# Patient Record
Sex: FEMALE | Race: WHITE | NOT HISPANIC OR LATINO | Employment: OTHER | URBAN - METROPOLITAN AREA
[De-identification: names, ages, dates, MRNs, and addresses within clinical notes are randomized per-mention and may not be internally consistent; named-entity substitution may affect disease eponyms.]

---

## 2023-01-16 PROBLEM — J18.9 PNEUMONIA: Status: ACTIVE | Noted: 2023-01-16

## 2023-01-16 PROBLEM — R09.89 LUNG CRACKLES: Status: ACTIVE | Noted: 2023-01-16

## 2023-01-16 PROBLEM — F32.0 DEPRESSION, MAJOR, SINGLE EPISODE, MILD (CMS-HCC): Status: ACTIVE | Noted: 2023-01-16

## 2023-01-16 PROBLEM — E66.9 OBESITY: Status: ACTIVE | Noted: 2023-01-16

## 2023-01-16 PROBLEM — R60.0 BILATERAL LEG EDEMA: Status: ACTIVE | Noted: 2023-01-16

## 2023-01-16 PROBLEM — N20.0 NEPHROLITHIASIS: Status: ACTIVE | Noted: 2023-01-16

## 2023-01-16 PROBLEM — D64.9 ANEMIA: Status: ACTIVE | Noted: 2023-01-16

## 2023-01-16 PROBLEM — M19.90 ARTHRITIS: Status: ACTIVE | Noted: 2023-01-16

## 2023-01-16 PROBLEM — N39.0 URINARY TRACT INFECTION: Status: ACTIVE | Noted: 2023-01-16

## 2023-01-16 PROBLEM — R06.02 SHORTNESS OF BREATH: Status: ACTIVE | Noted: 2023-01-16

## 2023-01-16 PROBLEM — R05.9 COUGH: Status: ACTIVE | Noted: 2023-01-16

## 2023-01-16 PROBLEM — F32.A DEPRESSION: Status: RESOLVED | Noted: 2023-01-16 | Resolved: 2023-01-16

## 2023-01-16 PROBLEM — R26.89 UNSTABLE BALANCE: Status: ACTIVE | Noted: 2023-01-16

## 2023-01-16 PROBLEM — M48.00 SPINAL STENOSIS: Status: ACTIVE | Noted: 2023-01-16

## 2023-01-16 PROBLEM — F32.A DEPRESSION: Status: ACTIVE | Noted: 2023-01-16

## 2023-01-16 PROBLEM — E78.2 DM TYPE 2 WITH DIABETIC MIXED HYPERLIPIDEMIA (MULTI): Status: ACTIVE | Noted: 2023-01-16

## 2023-01-16 PROBLEM — E78.5 HYPERLIPIDEMIA: Status: ACTIVE | Noted: 2023-01-16

## 2023-01-16 PROBLEM — M25.551 RIGHT HIP PAIN: Status: ACTIVE | Noted: 2023-01-16

## 2023-01-16 PROBLEM — R53.83 FATIGUE: Status: ACTIVE | Noted: 2023-01-16

## 2023-01-16 PROBLEM — I10 ESSENTIAL HYPERTENSION: Status: ACTIVE | Noted: 2023-01-16

## 2023-01-16 PROBLEM — J06.9 URTI (ACUTE UPPER RESPIRATORY INFECTION): Status: ACTIVE | Noted: 2023-01-16

## 2023-01-16 PROBLEM — M54.50 LOWER BACK PAIN: Status: ACTIVE | Noted: 2023-01-16

## 2023-01-16 PROBLEM — N18.9 CHRONIC KIDNEY DISEASE: Status: ACTIVE | Noted: 2023-01-16

## 2023-01-16 PROBLEM — E11.69 DM TYPE 2 WITH DIABETIC MIXED HYPERLIPIDEMIA (MULTI): Status: ACTIVE | Noted: 2023-01-16

## 2023-01-16 PROBLEM — E55.9 VITAMIN D DEFICIENCY: Status: ACTIVE | Noted: 2023-01-16

## 2023-01-16 RX ORDER — HYDROCODONE/ACETAMINOPHEN 5 MG-500MG
TABLET ORAL
COMMUNITY

## 2023-01-16 RX ORDER — OLMESARTAN MEDOXOMIL 40 MG/1
40 TABLET ORAL DAILY
COMMUNITY
End: 2023-03-06 | Stop reason: SDUPTHER

## 2023-01-16 RX ORDER — AMLODIPINE BESYLATE 10 MG/1
10 TABLET ORAL DAILY
COMMUNITY
End: 2023-03-06 | Stop reason: SDUPTHER

## 2023-01-16 RX ORDER — DICLOFENAC SODIUM 10 MG/G
2 GEL TOPICAL 4 TIMES DAILY
COMMUNITY
End: 2023-03-06 | Stop reason: SDUPTHER

## 2023-01-16 RX ORDER — LANCETS 26 GAUGE
1 EACH MISCELLANEOUS 2 TIMES DAILY
COMMUNITY
End: 2023-03-06 | Stop reason: SDUPTHER

## 2023-01-16 RX ORDER — FUROSEMIDE 20 MG/1
20 TABLET ORAL DAILY
COMMUNITY
End: 2023-03-06 | Stop reason: SDUPTHER

## 2023-01-16 RX ORDER — BLOOD SUGAR DIAGNOSTIC
STRIP MISCELLANEOUS 2 TIMES DAILY
COMMUNITY
End: 2023-03-06 | Stop reason: SDUPTHER

## 2023-01-16 RX ORDER — TIRZEPATIDE 5 MG/.5ML
5 INJECTION, SOLUTION SUBCUTANEOUS
COMMUNITY
End: 2023-03-06 | Stop reason: SDUPTHER

## 2023-01-17 PROBLEM — I10 MALIGNANT HYPERTENSION: Status: ACTIVE | Noted: 2023-01-17

## 2023-01-17 RX ORDER — CHOLECALCIFEROL (VITAMIN D3) 50 MCG
50 TABLET ORAL
COMMUNITY
End: 2023-03-06 | Stop reason: SDUPTHER

## 2023-02-17 PROBLEM — E08.22 DIABETES MELLITUS DUE TO UNDERLYING CONDITION WITH STAGE 3A CHRONIC KIDNEY DISEASE (MULTI): Status: ACTIVE | Noted: 2023-02-17

## 2023-02-17 PROBLEM — N18.31 DIABETES MELLITUS DUE TO UNDERLYING CONDITION WITH STAGE 3A CHRONIC KIDNEY DISEASE (MULTI): Status: ACTIVE | Noted: 2023-02-17

## 2023-02-17 RX ORDER — METOPROLOL TARTRATE 100 MG/1
1 TABLET ORAL
COMMUNITY
Start: 2013-04-17 | End: 2023-03-06 | Stop reason: SDUPTHER

## 2023-02-17 RX ORDER — FENOFIBRATE 145 MG/1
1 TABLET, FILM COATED ORAL
COMMUNITY
Start: 2013-04-17 | End: 2023-03-06 | Stop reason: SDUPTHER

## 2023-02-17 RX ORDER — CITALOPRAM 20 MG/1
1 TABLET, FILM COATED ORAL
COMMUNITY
Start: 2013-04-20 | End: 2023-03-06 | Stop reason: SDUPTHER

## 2023-02-17 RX ORDER — SIMVASTATIN 20 MG/1
1 TABLET, FILM COATED ORAL
COMMUNITY
Start: 2013-04-17 | End: 2023-03-06 | Stop reason: SDUPTHER

## 2023-03-06 ENCOUNTER — LAB (OUTPATIENT)
Dept: LAB | Facility: LAB | Age: 78
End: 2023-03-06
Payer: MEDICARE

## 2023-03-06 ENCOUNTER — OFFICE VISIT (OUTPATIENT)
Dept: PRIMARY CARE | Facility: CLINIC | Age: 78
End: 2023-03-06
Payer: MEDICARE

## 2023-03-06 VITALS — DIASTOLIC BLOOD PRESSURE: 70 MMHG | WEIGHT: 188 LBS | BODY MASS INDEX: 33.3 KG/M2 | SYSTOLIC BLOOD PRESSURE: 100 MMHG

## 2023-03-06 DIAGNOSIS — E11.69 DM TYPE 2 WITH DIABETIC MIXED HYPERLIPIDEMIA (MULTI): ICD-10-CM

## 2023-03-06 DIAGNOSIS — E55.9 VITAMIN D DEFICIENCY: ICD-10-CM

## 2023-03-06 DIAGNOSIS — E78.5 HYPERLIPIDEMIA, UNSPECIFIED HYPERLIPIDEMIA TYPE: ICD-10-CM

## 2023-03-06 DIAGNOSIS — E78.2 DM TYPE 2 WITH DIABETIC MIXED HYPERLIPIDEMIA (MULTI): ICD-10-CM

## 2023-03-06 DIAGNOSIS — E66.09 OBESITY DUE TO EXCESS CALORIES, UNSPECIFIED CLASSIFICATION, UNSPECIFIED WHETHER SERIOUS COMORBIDITY PRESENT: ICD-10-CM

## 2023-03-06 DIAGNOSIS — I10 ESSENTIAL HYPERTENSION: ICD-10-CM

## 2023-03-06 DIAGNOSIS — F32.0 DEPRESSION, MAJOR, SINGLE EPISODE, MILD (CMS-HCC): ICD-10-CM

## 2023-03-06 DIAGNOSIS — I10 ESSENTIAL HYPERTENSION: Primary | ICD-10-CM

## 2023-03-06 LAB
ALBUMIN (MG/L) IN URINE: 8.8 MG/L
ALBUMIN/CREATININE (UG/MG) IN URINE: 13.9 UG/MG CRT (ref 0–30)
CHOLESTEROL (MG/DL) IN SER/PLAS: 127 MG/DL (ref 0–199)
CHOLESTEROL IN HDL (MG/DL) IN SER/PLAS: 45.4 MG/DL
CHOLESTEROL/HDL RATIO: 2.8
CREATININE (MG/DL) IN URINE: 63.4 MG/DL (ref 20–320)
ESTIMATED AVERAGE GLUCOSE FOR HBA1C: 123 MG/DL
HEMOGLOBIN A1C/HEMOGLOBIN TOTAL IN BLOOD: 5.9 %
LDL: 63 MG/DL (ref 0–99)
THYROTROPIN (MIU/L) IN SER/PLAS BY DETECTION LIMIT <= 0.05 MIU/L: 1.89 MIU/L (ref 0.44–3.98)
TRIGLYCERIDE (MG/DL) IN SER/PLAS: 94 MG/DL (ref 0–149)
VLDL: 19 MG/DL (ref 0–40)

## 2023-03-06 PROCEDURE — 84443 ASSAY THYROID STIM HORMONE: CPT

## 2023-03-06 PROCEDURE — 82043 UR ALBUMIN QUANTITATIVE: CPT

## 2023-03-06 PROCEDURE — 83036 HEMOGLOBIN GLYCOSYLATED A1C: CPT

## 2023-03-06 PROCEDURE — 36415 COLL VENOUS BLD VENIPUNCTURE: CPT

## 2023-03-06 PROCEDURE — 82570 ASSAY OF URINE CREATININE: CPT

## 2023-03-06 PROCEDURE — 99214 OFFICE O/P EST MOD 30 MIN: CPT | Performed by: INTERNAL MEDICINE

## 2023-03-06 PROCEDURE — 80061 LIPID PANEL: CPT

## 2023-03-06 PROCEDURE — 3074F SYST BP LT 130 MM HG: CPT | Performed by: INTERNAL MEDICINE

## 2023-03-06 PROCEDURE — 3078F DIAST BP <80 MM HG: CPT | Performed by: INTERNAL MEDICINE

## 2023-03-06 PROCEDURE — 82306 VITAMIN D 25 HYDROXY: CPT

## 2023-03-06 RX ORDER — OLMESARTAN MEDOXOMIL 40 MG/1
40 TABLET ORAL DAILY
Qty: 90 TABLET | Refills: 1 | Status: SHIPPED | OUTPATIENT
Start: 2023-03-06 | End: 2023-03-06 | Stop reason: SDUPTHER

## 2023-03-06 RX ORDER — LANCETS 26 GAUGE
1 EACH MISCELLANEOUS 2 TIMES DAILY
Qty: 100 EACH | Refills: 1 | Status: SHIPPED | OUTPATIENT
Start: 2023-03-06 | End: 2023-03-06 | Stop reason: SDUPTHER

## 2023-03-06 RX ORDER — CHOLECALCIFEROL (VITAMIN D3) 50 MCG
50 TABLET ORAL DAILY
Qty: 90 TABLET | Refills: 1 | Status: SHIPPED | OUTPATIENT
Start: 2023-03-06 | End: 2023-03-06 | Stop reason: SDUPTHER

## 2023-03-06 RX ORDER — BLOOD SUGAR DIAGNOSTIC
1 STRIP MISCELLANEOUS 2 TIMES DAILY
Qty: 100 STRIP | Refills: 2 | Status: SHIPPED | OUTPATIENT
Start: 2023-03-06 | End: 2023-12-19 | Stop reason: SDUPTHER

## 2023-03-06 RX ORDER — BLOOD SUGAR DIAGNOSTIC
1 STRIP MISCELLANEOUS 2 TIMES DAILY
Qty: 100 STRIP | Refills: 2 | Status: SHIPPED | OUTPATIENT
Start: 2023-03-06 | End: 2023-03-06 | Stop reason: SDUPTHER

## 2023-03-06 RX ORDER — TIRZEPATIDE 5 MG/.5ML
5 INJECTION, SOLUTION SUBCUTANEOUS
Qty: 2 ML | Refills: 2 | Status: SHIPPED | OUTPATIENT
Start: 2023-03-06 | End: 2023-05-15 | Stop reason: ALTCHOICE

## 2023-03-06 RX ORDER — AMLODIPINE BESYLATE 10 MG/1
10 TABLET ORAL DAILY
Qty: 90 TABLET | Refills: 1 | Status: SHIPPED | OUTPATIENT
Start: 2023-03-06 | End: 2023-03-06 | Stop reason: SDUPTHER

## 2023-03-06 RX ORDER — METOPROLOL TARTRATE 100 MG/1
100 TABLET ORAL
Qty: 90 TABLET | Refills: 1 | Status: SHIPPED | OUTPATIENT
Start: 2023-03-06 | End: 2023-03-06 | Stop reason: SDUPTHER

## 2023-03-06 RX ORDER — FUROSEMIDE 20 MG/1
20 TABLET ORAL DAILY
Qty: 90 TABLET | Refills: 1 | Status: SHIPPED | OUTPATIENT
Start: 2023-03-06 | End: 2023-03-06 | Stop reason: SDUPTHER

## 2023-03-06 RX ORDER — LANCETS 26 GAUGE
1 EACH MISCELLANEOUS 2 TIMES DAILY
Qty: 100 EACH | Refills: 1 | Status: SHIPPED | OUTPATIENT
Start: 2023-03-06 | End: 2023-12-19 | Stop reason: SDUPTHER

## 2023-03-06 RX ORDER — AMLODIPINE BESYLATE 10 MG/1
10 TABLET ORAL DAILY
Qty: 90 TABLET | Refills: 1 | Status: SHIPPED
Start: 2023-03-06 | End: 2023-03-06 | Stop reason: SDUPTHER

## 2023-03-06 RX ORDER — SIMVASTATIN 20 MG/1
20 TABLET, FILM COATED ORAL
Qty: 90 TABLET | Refills: 1 | Status: SHIPPED | OUTPATIENT
Start: 2023-03-06 | End: 2023-06-05 | Stop reason: SDUPTHER

## 2023-03-06 RX ORDER — CHOLECALCIFEROL (VITAMIN D3) 50 MCG
50 TABLET ORAL DAILY
Qty: 90 TABLET | Refills: 1 | Status: SHIPPED | OUTPATIENT
Start: 2023-03-06 | End: 2023-12-19 | Stop reason: SDUPTHER

## 2023-03-06 RX ORDER — FUROSEMIDE 20 MG/1
20 TABLET ORAL DAILY
Qty: 90 TABLET | Refills: 1 | Status: SHIPPED | OUTPATIENT
Start: 2023-03-06 | End: 2023-06-05 | Stop reason: ALTCHOICE

## 2023-03-06 RX ORDER — CITALOPRAM 20 MG/1
20 TABLET, FILM COATED ORAL
Qty: 90 TABLET | Refills: 1 | Status: SHIPPED | OUTPATIENT
Start: 2023-03-06 | End: 2023-03-06 | Stop reason: SDUPTHER

## 2023-03-06 RX ORDER — SIMVASTATIN 20 MG/1
20 TABLET, FILM COATED ORAL
Qty: 90 TABLET | Refills: 1 | Status: SHIPPED | OUTPATIENT
Start: 2023-03-06 | End: 2023-03-06 | Stop reason: SDUPTHER

## 2023-03-06 RX ORDER — DICLOFENAC SODIUM 10 MG/G
2 GEL TOPICAL 4 TIMES DAILY
Qty: 100 G | Refills: 1 | Status: SHIPPED | OUTPATIENT
Start: 2023-03-06 | End: 2023-04-19 | Stop reason: SDUPTHER

## 2023-03-06 RX ORDER — OLMESARTAN MEDOXOMIL 40 MG/1
40 TABLET ORAL DAILY
Qty: 90 TABLET | Refills: 1 | Status: SHIPPED | OUTPATIENT
Start: 2023-03-06 | End: 2023-06-05 | Stop reason: SDUPTHER

## 2023-03-06 RX ORDER — METOPROLOL TARTRATE 100 MG/1
100 TABLET ORAL
Qty: 90 TABLET | Refills: 1 | Status: SHIPPED | OUTPATIENT
Start: 2023-03-06 | End: 2023-06-05 | Stop reason: SDUPTHER

## 2023-03-06 RX ORDER — DICLOFENAC SODIUM 10 MG/G
2 GEL TOPICAL 4 TIMES DAILY
Qty: 100 G | Refills: 1 | Status: SHIPPED | OUTPATIENT
Start: 2023-03-06 | End: 2023-03-06 | Stop reason: SDUPTHER

## 2023-03-06 RX ORDER — FENOFIBRATE 145 MG/1
145 TABLET, FILM COATED ORAL
Qty: 90 TABLET | Refills: 1 | Status: SHIPPED | OUTPATIENT
Start: 2023-03-06 | End: 2023-03-06 | Stop reason: SDUPTHER

## 2023-03-06 RX ORDER — AMLODIPINE BESYLATE 10 MG/1
10 TABLET ORAL DAILY
Qty: 90 TABLET | Refills: 1 | Status: SHIPPED | OUTPATIENT
Start: 2023-03-06 | End: 2023-06-05 | Stop reason: SDUPTHER

## 2023-03-06 RX ORDER — CITALOPRAM 20 MG/1
20 TABLET, FILM COATED ORAL
Qty: 90 TABLET | Refills: 1 | Status: SHIPPED | OUTPATIENT
Start: 2023-03-06 | End: 2023-06-05 | Stop reason: SDUPTHER

## 2023-03-06 RX ORDER — FENOFIBRATE 145 MG/1
145 TABLET, FILM COATED ORAL
Qty: 90 TABLET | Refills: 1 | Status: SHIPPED | OUTPATIENT
Start: 2023-03-06 | End: 2023-06-05 | Stop reason: SDUPTHER

## 2023-03-06 ASSESSMENT — PATIENT HEALTH QUESTIONNAIRE - PHQ9
2. FEELING DOWN, DEPRESSED OR HOPELESS: NOT AT ALL
SUM OF ALL RESPONSES TO PHQ9 QUESTIONS 1 AND 2: 0
1. LITTLE INTEREST OR PLEASURE IN DOING THINGS: NOT AT ALL

## 2023-03-06 ASSESSMENT — ENCOUNTER SYMPTOMS: HYPERTENSION: 1

## 2023-03-06 NOTE — PROGRESS NOTES
Subjective   Patient ID: Francheska Ochoa is a 77 y.o. female who presents for Hypertension, Hyperlipidemia, Diabetes, and Obesity.    Hypertension    Hyperlipidemia    Diabetes         Review of Systems    Objective   /70   Wt 85.3 kg (188 lb)   BMI 33.30 kg/m²     Physical Exam  Constitutional:       Appearance: She is well-developed.   Cardiovascular:      Rate and Rhythm: Normal rate and regular rhythm.      Heart sounds: Normal heart sounds. No murmur heard.  Pulmonary:      Effort: Pulmonary effort is normal.      Breath sounds: Normal breath sounds.   Abdominal:      General: Bowel sounds are normal.      Palpations: Abdomen is soft.         Assessment/Plan   Problem List Items Addressed This Visit          Circulatory    Essential hypertension - Primary    Relevant Medications    amLODIPine (Norvasc) 10 mg tablet    Autolet lancing device    blood sugar diagnostic (Accu-Chek Guide test strips) strip    cholecalciferol (Vitamin D-3) 50 MCG (2000 UT) tablet    citalopram (CeleXA) 20 mg tablet    diclofenac sodium (Voltaren) 1 % gel gel    fenofibrate (Tricor) 145 mg tablet    furosemide (Lasix) 20 mg tablet    metoprolol tartrate (Lopressor) 100 mg tablet    olmesartan (BENIcar) 40 mg tablet    simvastatin (Zocor) 20 mg tablet    Other Relevant Orders    Albumin , Urine Random    Vitamin D 1,25 Dihydroxy    Hemoglobin A1C    Lipid Panel    TSH with reflex to Free T4 if abnormal       Endocrine/Metabolic    Obesity    Relevant Medications    amLODIPine (Norvasc) 10 mg tablet    Autolet lancing device    blood sugar diagnostic (Accu-Chek Guide test strips) strip    cholecalciferol (Vitamin D-3) 50 MCG (2000 UT) tablet    citalopram (CeleXA) 20 mg tablet    diclofenac sodium (Voltaren) 1 % gel gel    fenofibrate (Tricor) 145 mg tablet    furosemide (Lasix) 20 mg tablet    metoprolol tartrate (Lopressor) 100 mg tablet    olmesartan (BENIcar) 40 mg tablet    simvastatin (Zocor) 20 mg tablet    Vitamin D  deficiency    Relevant Orders    Albumin , Urine Random    Vitamin D 1,25 Dihydroxy    Hemoglobin A1C    Lipid Panel    TSH with reflex to Free T4 if abnormal    DM type 2 with diabetic mixed hyperlipidemia (CMS/HCC)    Relevant Medications    amLODIPine (Norvasc) 10 mg tablet    Autolet lancing device    blood sugar diagnostic (Accu-Chek Guide test strips) strip    cholecalciferol (Vitamin D-3) 50 MCG (2000 UT) tablet    citalopram (CeleXA) 20 mg tablet    diclofenac sodium (Voltaren) 1 % gel gel    fenofibrate (Tricor) 145 mg tablet    furosemide (Lasix) 20 mg tablet    metoprolol tartrate (Lopressor) 100 mg tablet    olmesartan (BENIcar) 40 mg tablet    simvastatin (Zocor) 20 mg tablet    tirzepatide (Mounjaro) 5 mg/0.5 mL pen injector    Other Relevant Orders    Albumin , Urine Random    Vitamin D 1,25 Dihydroxy    Hemoglobin A1C    Lipid Panel    TSH with reflex to Free T4 if abnormal       Other    Depression, major, single episode, mild (CMS/HCC)    Hyperlipidemia    Relevant Medications    amLODIPine (Norvasc) 10 mg tablet    Autolet lancing device    blood sugar diagnostic (Accu-Chek Guide test strips) strip    cholecalciferol (Vitamin D-3) 50 MCG (2000 UT) tablet    citalopram (CeleXA) 20 mg tablet    diclofenac sodium (Voltaren) 1 % gel gel    fenofibrate (Tricor) 145 mg tablet    furosemide (Lasix) 20 mg tablet    metoprolol tartrate (Lopressor) 100 mg tablet    olmesartan (BENIcar) 40 mg tablet    simvastatin (Zocor) 20 mg tablet      HTN.  Well controlled.  Continue with current medications.  Check BP at home daily.  Report to us if the numbers are higher than 130/80.     Diabetes Mellitus type II, under good  control.  1. Rx changes none  2. Education: Reviewed ‘ABCs’ of diabetes management (respective goals in parentheses):  A1C (<7), blood pressure (<130/80), and cholesterol (LDL <100).  3. Compliance at present is estimated to be good. Efforts to improve compliance were discussed.  4. Follow up in  3 months        HLD  Stable  Continue with statins  Check lipids

## 2023-03-09 LAB — CALCIDIOL (25 OH VITAMIN D3) (NG/ML) IN SER/PLAS: 60 NG/ML

## 2023-04-02 DIAGNOSIS — R53.83 OTHER FATIGUE: ICD-10-CM

## 2023-04-03 RX ORDER — OXYBUTYNIN CHLORIDE 5 MG/1
TABLET ORAL
Qty: 90 TABLET | Refills: 0 | Status: SHIPPED | OUTPATIENT
Start: 2023-04-03 | End: 2023-06-05 | Stop reason: ALTCHOICE

## 2023-04-19 DIAGNOSIS — E78.5 HYPERLIPIDEMIA, UNSPECIFIED HYPERLIPIDEMIA TYPE: ICD-10-CM

## 2023-04-19 DIAGNOSIS — E11.69 DM TYPE 2 WITH DIABETIC MIXED HYPERLIPIDEMIA (MULTI): ICD-10-CM

## 2023-04-19 DIAGNOSIS — I10 ESSENTIAL HYPERTENSION: ICD-10-CM

## 2023-04-19 DIAGNOSIS — E66.09 OBESITY DUE TO EXCESS CALORIES, UNSPECIFIED CLASSIFICATION, UNSPECIFIED WHETHER SERIOUS COMORBIDITY PRESENT: ICD-10-CM

## 2023-04-19 DIAGNOSIS — E78.2 DM TYPE 2 WITH DIABETIC MIXED HYPERLIPIDEMIA (MULTI): ICD-10-CM

## 2023-04-19 DIAGNOSIS — M54.50 LOW BACK PAIN, UNSPECIFIED BACK PAIN LATERALITY, UNSPECIFIED CHRONICITY, UNSPECIFIED WHETHER SCIATICA PRESENT: ICD-10-CM

## 2023-04-19 RX ORDER — DICLOFENAC SODIUM 10 MG/G
GEL TOPICAL
Qty: 300 G | Refills: 0 | Status: SHIPPED | OUTPATIENT
Start: 2023-04-19 | End: 2023-06-05 | Stop reason: ALTCHOICE

## 2023-04-21 ENCOUNTER — TELEPHONE (OUTPATIENT)
Dept: PRIMARY CARE | Facility: CLINIC | Age: 78
End: 2023-04-21
Payer: MEDICARE

## 2023-04-21 NOTE — TELEPHONE ENCOUNTER
Pt is calling. [facility] called her to participate in a clinical trial for kidneys.   She is wanting your input.   It would require her to have a kidney biopsy.   Can you please call her to discuss.

## 2023-04-27 DIAGNOSIS — E78.2 DM TYPE 2 WITH DIABETIC MIXED HYPERLIPIDEMIA (MULTI): ICD-10-CM

## 2023-04-27 DIAGNOSIS — E11.69 DM TYPE 2 WITH DIABETIC MIXED HYPERLIPIDEMIA (MULTI): ICD-10-CM

## 2023-04-27 RX ORDER — TIRZEPATIDE 7.5 MG/.5ML
7.5 INJECTION, SOLUTION SUBCUTANEOUS
Qty: 2 ML | Refills: 2 | Status: SHIPPED | OUTPATIENT
Start: 2023-04-27 | End: 2023-05-15 | Stop reason: ALTCHOICE

## 2023-05-15 DIAGNOSIS — E78.2 DM TYPE 2 WITH DIABETIC MIXED HYPERLIPIDEMIA (MULTI): ICD-10-CM

## 2023-05-15 DIAGNOSIS — E11.69 DM TYPE 2 WITH DIABETIC MIXED HYPERLIPIDEMIA (MULTI): ICD-10-CM

## 2023-05-15 RX ORDER — TIRZEPATIDE 7.5 MG/.5ML
7.5 INJECTION, SOLUTION SUBCUTANEOUS
Qty: 2 ML | Refills: 2 | Status: SHIPPED | OUTPATIENT
Start: 2023-05-15 | End: 2023-06-05 | Stop reason: SDUPTHER

## 2023-05-31 DIAGNOSIS — D64.9 ANEMIA, UNSPECIFIED TYPE: ICD-10-CM

## 2023-05-31 RX ORDER — FERROUS GLUCONATE 324(38)MG
TABLET ORAL
Qty: 180 TABLET | Refills: 0 | Status: SHIPPED | OUTPATIENT
Start: 2023-05-31 | End: 2023-10-30

## 2023-06-05 ENCOUNTER — LAB (OUTPATIENT)
Dept: LAB | Facility: LAB | Age: 78
End: 2023-06-05
Payer: MEDICARE

## 2023-06-05 ENCOUNTER — OFFICE VISIT (OUTPATIENT)
Dept: PRIMARY CARE | Facility: CLINIC | Age: 78
End: 2023-06-05
Payer: MEDICARE

## 2023-06-05 VITALS — BODY MASS INDEX: 32.42 KG/M2 | DIASTOLIC BLOOD PRESSURE: 52 MMHG | SYSTOLIC BLOOD PRESSURE: 100 MMHG | WEIGHT: 183 LBS

## 2023-06-05 DIAGNOSIS — N18.31 DIABETES MELLITUS DUE TO UNDERLYING CONDITION WITH STAGE 3A CHRONIC KIDNEY DISEASE, WITHOUT LONG-TERM CURRENT USE OF INSULIN (MULTI): ICD-10-CM

## 2023-06-05 DIAGNOSIS — M19.90 ARTHRITIS: ICD-10-CM

## 2023-06-05 DIAGNOSIS — E66.09 OBESITY DUE TO EXCESS CALORIES, UNSPECIFIED CLASSIFICATION, UNSPECIFIED WHETHER SERIOUS COMORBIDITY PRESENT: ICD-10-CM

## 2023-06-05 DIAGNOSIS — D63.1 ANEMIA DUE TO STAGE 3 CHRONIC KIDNEY DISEASE, UNSPECIFIED WHETHER STAGE 3A OR 3B CKD (MULTI): ICD-10-CM

## 2023-06-05 DIAGNOSIS — E08.22 DIABETES MELLITUS DUE TO UNDERLYING CONDITION WITH STAGE 3A CHRONIC KIDNEY DISEASE, WITHOUT LONG-TERM CURRENT USE OF INSULIN (MULTI): ICD-10-CM

## 2023-06-05 DIAGNOSIS — N18.30 ANEMIA DUE TO STAGE 3 CHRONIC KIDNEY DISEASE, UNSPECIFIED WHETHER STAGE 3A OR 3B CKD (MULTI): ICD-10-CM

## 2023-06-05 DIAGNOSIS — N18.5 STAGE 5 CHRONIC KIDNEY DISEASE NOT ON CHRONIC DIALYSIS (MULTI): ICD-10-CM

## 2023-06-05 DIAGNOSIS — E78.5 HYPERLIPIDEMIA, UNSPECIFIED HYPERLIPIDEMIA TYPE: ICD-10-CM

## 2023-06-05 DIAGNOSIS — E78.2 DM TYPE 2 WITH DIABETIC MIXED HYPERLIPIDEMIA (MULTI): ICD-10-CM

## 2023-06-05 DIAGNOSIS — N20.0 NEPHROLITHIASIS: ICD-10-CM

## 2023-06-05 DIAGNOSIS — I10 ESSENTIAL HYPERTENSION: Primary | ICD-10-CM

## 2023-06-05 DIAGNOSIS — E11.69 DM TYPE 2 WITH DIABETIC MIXED HYPERLIPIDEMIA (MULTI): ICD-10-CM

## 2023-06-05 DIAGNOSIS — E55.9 VITAMIN D DEFICIENCY: ICD-10-CM

## 2023-06-05 LAB
ALANINE AMINOTRANSFERASE (SGPT) (U/L) IN SER/PLAS: 25 U/L (ref 7–45)
ALBUMIN (G/DL) IN SER/PLAS: 4.1 G/DL (ref 3.4–5)
ALKALINE PHOSPHATASE (U/L) IN SER/PLAS: 46 U/L (ref 33–136)
ANION GAP IN SER/PLAS: 14 MMOL/L (ref 10–20)
ASPARTATE AMINOTRANSFERASE (SGOT) (U/L) IN SER/PLAS: 28 U/L (ref 9–39)
BILIRUBIN TOTAL (MG/DL) IN SER/PLAS: 0.5 MG/DL (ref 0–1.2)
CALCIUM (MG/DL) IN SER/PLAS: 9.9 MG/DL (ref 8.6–10.6)
CARBON DIOXIDE, TOTAL (MMOL/L) IN SER/PLAS: 28 MMOL/L (ref 21–32)
CHLORIDE (MMOL/L) IN SER/PLAS: 103 MMOL/L (ref 98–107)
CREATININE (MG/DL) IN SER/PLAS: 2.1 MG/DL (ref 0.5–1.05)
ESTIMATED AVERAGE GLUCOSE FOR HBA1C: 111 MG/DL
GFR FEMALE: 23 ML/MIN/1.73M2
GLUCOSE (MG/DL) IN SER/PLAS: 92 MG/DL (ref 74–99)
HEMOGLOBIN A1C/HEMOGLOBIN TOTAL IN BLOOD: 5.5 %
HEPATITIS C VIRUS AB PRESENCE IN SERUM: NONREACTIVE
POTASSIUM (MMOL/L) IN SER/PLAS: 5.1 MMOL/L (ref 3.5–5.3)
PROTEIN TOTAL: 6.9 G/DL (ref 6.4–8.2)
SODIUM (MMOL/L) IN SER/PLAS: 140 MMOL/L (ref 136–145)
UREA NITROGEN (MG/DL) IN SER/PLAS: 60 MG/DL (ref 6–23)

## 2023-06-05 PROCEDURE — 3074F SYST BP LT 130 MM HG: CPT | Performed by: INTERNAL MEDICINE

## 2023-06-05 PROCEDURE — 1159F MED LIST DOCD IN RCRD: CPT | Performed by: INTERNAL MEDICINE

## 2023-06-05 PROCEDURE — 36415 COLL VENOUS BLD VENIPUNCTURE: CPT

## 2023-06-05 PROCEDURE — 86803 HEPATITIS C AB TEST: CPT

## 2023-06-05 PROCEDURE — 80053 COMPREHEN METABOLIC PANEL: CPT

## 2023-06-05 PROCEDURE — 99214 OFFICE O/P EST MOD 30 MIN: CPT | Performed by: INTERNAL MEDICINE

## 2023-06-05 PROCEDURE — 83036 HEMOGLOBIN GLYCOSYLATED A1C: CPT

## 2023-06-05 PROCEDURE — 3078F DIAST BP <80 MM HG: CPT | Performed by: INTERNAL MEDICINE

## 2023-06-05 RX ORDER — AMLODIPINE BESYLATE 10 MG/1
10 TABLET ORAL DAILY
Qty: 90 TABLET | Refills: 1 | Status: SHIPPED | OUTPATIENT
Start: 2023-06-05 | End: 2023-11-25

## 2023-06-05 RX ORDER — SIMVASTATIN 20 MG/1
20 TABLET, FILM COATED ORAL
Qty: 90 TABLET | Refills: 1 | Status: SHIPPED | OUTPATIENT
Start: 2023-06-05 | End: 2023-12-19 | Stop reason: SDUPTHER

## 2023-06-05 RX ORDER — METOPROLOL TARTRATE 100 MG/1
100 TABLET ORAL
Qty: 90 TABLET | Refills: 1 | Status: SHIPPED | OUTPATIENT
Start: 2023-06-05 | End: 2023-12-19 | Stop reason: SDUPTHER

## 2023-06-05 RX ORDER — OLMESARTAN MEDOXOMIL 40 MG/1
40 TABLET ORAL DAILY
Qty: 90 TABLET | Refills: 1 | Status: SHIPPED | OUTPATIENT
Start: 2023-06-05 | End: 2023-12-19 | Stop reason: SDUPTHER

## 2023-06-05 RX ORDER — CITALOPRAM 20 MG/1
20 TABLET, FILM COATED ORAL
Qty: 90 TABLET | Refills: 1 | Status: SHIPPED | OUTPATIENT
Start: 2023-06-05 | End: 2023-12-19 | Stop reason: SDUPTHER

## 2023-06-05 RX ORDER — TIRZEPATIDE 7.5 MG/.5ML
7.5 INJECTION, SOLUTION SUBCUTANEOUS
Qty: 2 ML | Refills: 2 | Status: SHIPPED | OUTPATIENT
Start: 2023-06-05 | End: 2023-08-29 | Stop reason: SDUPTHER

## 2023-06-05 RX ORDER — FENOFIBRATE 145 MG/1
145 TABLET, FILM COATED ORAL
Qty: 90 TABLET | Refills: 1 | Status: SHIPPED | OUTPATIENT
Start: 2023-06-05 | End: 2023-12-11

## 2023-06-05 ASSESSMENT — ENCOUNTER SYMPTOMS
CARDIOVASCULAR NEGATIVE: 1
RESPIRATORY NEGATIVE: 1
GASTROINTESTINAL NEGATIVE: 1
CONSTITUTIONAL NEGATIVE: 1

## 2023-06-05 ASSESSMENT — PATIENT HEALTH QUESTIONNAIRE - PHQ9
SUM OF ALL RESPONSES TO PHQ9 QUESTIONS 1 AND 2: 0
1. LITTLE INTEREST OR PLEASURE IN DOING THINGS: NOT AT ALL
2. FEELING DOWN, DEPRESSED OR HOPELESS: NOT AT ALL

## 2023-06-05 NOTE — PROGRESS NOTES
Subjective   Patient ID: Francheska Ochoa is a 77 y.o. female who presents for Follow-up.  HPI    Review of Systems   Constitutional: Negative.    Respiratory: Negative.     Cardiovascular: Negative.    Gastrointestinal: Negative.        Objective   Physical Exam  Constitutional:       Appearance: She is well-developed.   Cardiovascular:      Rate and Rhythm: Normal rate and regular rhythm.      Heart sounds: Normal heart sounds. No murmur heard.  Pulmonary:      Effort: Pulmonary effort is normal.      Breath sounds: Normal breath sounds.   Abdominal:      General: Bowel sounds are normal.      Palpations: Abdomen is soft.         Assessment/Plan   Problem List Items Addressed This Visit          Circulatory    Essential hypertension - Primary       Genitourinary    Chronic kidney disease    Nephrolithiasis       Musculoskeletal    Arthritis       Endocrine/Metabolic    Obesity    Vitamin D deficiency    Diabetes mellitus due to underlying condition with stage 3a chronic kidney disease (CMS/HCC)    Relevant Orders    Comprehensive Metabolic Panel    Hemoglobin A1C    Hepatitis C antibody    Referral to Podiatry       Hematologic    Anemia       Other    Hyperlipidemia     Mild tremor noted on the L hand  Likely essential tremor  No specific changes in meds    HTN  Well controlled  C/w current    CKD  Worsening numbers  US negative  Repeat CMP today  If further decline noted, I will discuss with her nephrologist    VARUN  On statins    DM  Well controlled  Good numbers and weight loss with Mounjaro  Podiatry referral given    Up to date with screening    Lab Results   Component Value Date    WBC 6.4 02/16/2023    HGB 11.6 (L) 02/16/2023    HCT 36.3 02/16/2023     02/16/2023    CHOL 127 03/06/2023    TRIG 94 03/06/2023    HDL 45.4 03/06/2023    ALT 10 12/02/2022    AST 14 12/02/2022     02/16/2023    K 4.7 02/16/2023     02/16/2023    CREATININE 1.98 (H) 02/16/2023    BUN 53 (H) 02/16/2023    CO2 31  02/16/2023    TSH 1.89 03/06/2023    HGBA1C 5.9 (A) 03/06/2023     par  MDM  1) COMPLEXITY: MORE THAN 1 STABLE CHRONIC CONDITION ADDRESSED OR 1 ACUTE ILLNESS ADDRESSED   2)DATA: TESTS INTERPRETED AND OR ORDERED, TOOK INDEPENDENT HISTORY OR RECORDS REVIEWED  3)RISK: MODERATE RISK DUE TO NATURE OF MEDICAL CONDITIONS/COMORBIDITY OR MEDICATIONS ORDERED OR SURGICAL OR PROCEDURE REFERRAL         Shruti Murphy MD

## 2023-06-06 NOTE — RESULT ENCOUNTER NOTE
Please let her know that her kidney function got worse.  I want her to call the nephrologist and discuss with her.

## 2023-06-22 DIAGNOSIS — R26.89 UNSTABLE BALANCE: ICD-10-CM

## 2023-08-29 DIAGNOSIS — E78.2 DM TYPE 2 WITH DIABETIC MIXED HYPERLIPIDEMIA (MULTI): ICD-10-CM

## 2023-08-29 DIAGNOSIS — E11.69 DM TYPE 2 WITH DIABETIC MIXED HYPERLIPIDEMIA (MULTI): ICD-10-CM

## 2023-08-29 RX ORDER — TIRZEPATIDE 7.5 MG/.5ML
7.5 INJECTION, SOLUTION SUBCUTANEOUS
Qty: 2 ML | Refills: 2 | Status: SHIPPED | OUTPATIENT
Start: 2023-08-29 | End: 2023-08-29

## 2023-09-12 ENCOUNTER — APPOINTMENT (OUTPATIENT)
Dept: PRIMARY CARE | Facility: CLINIC | Age: 78
End: 2023-09-12
Payer: MEDICARE

## 2023-09-19 ENCOUNTER — OFFICE VISIT (OUTPATIENT)
Dept: PRIMARY CARE | Facility: CLINIC | Age: 78
End: 2023-09-19
Payer: MEDICARE

## 2023-09-19 ENCOUNTER — LAB (OUTPATIENT)
Dept: LAB | Facility: LAB | Age: 78
End: 2023-09-19
Payer: MEDICARE

## 2023-09-19 VITALS — SYSTOLIC BLOOD PRESSURE: 129 MMHG | DIASTOLIC BLOOD PRESSURE: 80 MMHG | WEIGHT: 176 LBS | BODY MASS INDEX: 31.18 KG/M2

## 2023-09-19 DIAGNOSIS — I10 ESSENTIAL HYPERTENSION: ICD-10-CM

## 2023-09-19 DIAGNOSIS — Z00.00 ROUTINE GENERAL MEDICAL EXAMINATION AT HEALTH CARE FACILITY: ICD-10-CM

## 2023-09-19 DIAGNOSIS — E11.69 DM TYPE 2 WITH DIABETIC MIXED HYPERLIPIDEMIA (MULTI): ICD-10-CM

## 2023-09-19 DIAGNOSIS — Z23 NEED FOR INFLUENZA VACCINATION: ICD-10-CM

## 2023-09-19 DIAGNOSIS — N18.32 STAGE 3B CHRONIC KIDNEY DISEASE (MULTI): ICD-10-CM

## 2023-09-19 DIAGNOSIS — I10 MALIGNANT HYPERTENSION: ICD-10-CM

## 2023-09-19 DIAGNOSIS — E78.2 DM TYPE 2 WITH DIABETIC MIXED HYPERLIPIDEMIA (MULTI): ICD-10-CM

## 2023-09-19 DIAGNOSIS — E78.5 HYPERLIPIDEMIA, UNSPECIFIED HYPERLIPIDEMIA TYPE: ICD-10-CM

## 2023-09-19 LAB
ALANINE AMINOTRANSFERASE (SGPT) (U/L) IN SER/PLAS: 11 U/L (ref 7–45)
ALBUMIN (G/DL) IN SER/PLAS: 4.1 G/DL (ref 3.4–5)
ALKALINE PHOSPHATASE (U/L) IN SER/PLAS: 41 U/L (ref 33–136)
ANION GAP IN SER/PLAS: 14 MMOL/L (ref 10–20)
ASPARTATE AMINOTRANSFERASE (SGOT) (U/L) IN SER/PLAS: 14 U/L (ref 9–39)
BILIRUBIN TOTAL (MG/DL) IN SER/PLAS: 0.5 MG/DL (ref 0–1.2)
CALCIUM (MG/DL) IN SER/PLAS: 9.8 MG/DL (ref 8.6–10.6)
CARBON DIOXIDE, TOTAL (MMOL/L) IN SER/PLAS: 28 MMOL/L (ref 21–32)
CHLORIDE (MMOL/L) IN SER/PLAS: 105 MMOL/L (ref 98–107)
CREATININE (MG/DL) IN SER/PLAS: 1.49 MG/DL (ref 0.5–1.05)
ESTIMATED AVERAGE GLUCOSE FOR HBA1C: 103 MG/DL
GFR FEMALE: 35 ML/MIN/1.73M2
GLUCOSE (MG/DL) IN SER/PLAS: 80 MG/DL (ref 74–99)
HEMOGLOBIN A1C/HEMOGLOBIN TOTAL IN BLOOD: 5.2 %
POTASSIUM (MMOL/L) IN SER/PLAS: 5.1 MMOL/L (ref 3.5–5.3)
PROTEIN TOTAL: 6.7 G/DL (ref 6.4–8.2)
SODIUM (MMOL/L) IN SER/PLAS: 142 MMOL/L (ref 136–145)
UREA NITROGEN (MG/DL) IN SER/PLAS: 30 MG/DL (ref 6–23)

## 2023-09-19 PROCEDURE — 1159F MED LIST DOCD IN RCRD: CPT | Performed by: INTERNAL MEDICINE

## 2023-09-19 PROCEDURE — 80053 COMPREHEN METABOLIC PANEL: CPT

## 2023-09-19 PROCEDURE — 1036F TOBACCO NON-USER: CPT | Performed by: INTERNAL MEDICINE

## 2023-09-19 PROCEDURE — 36415 COLL VENOUS BLD VENIPUNCTURE: CPT

## 2023-09-19 PROCEDURE — 3074F SYST BP LT 130 MM HG: CPT | Performed by: INTERNAL MEDICINE

## 2023-09-19 PROCEDURE — 90662 IIV NO PRSV INCREASED AG IM: CPT | Performed by: INTERNAL MEDICINE

## 2023-09-19 PROCEDURE — G0439 PPPS, SUBSEQ VISIT: HCPCS | Performed by: INTERNAL MEDICINE

## 2023-09-19 PROCEDURE — G0008 ADMIN INFLUENZA VIRUS VAC: HCPCS | Performed by: INTERNAL MEDICINE

## 2023-09-19 PROCEDURE — 3079F DIAST BP 80-89 MM HG: CPT | Performed by: INTERNAL MEDICINE

## 2023-09-19 PROCEDURE — 83036 HEMOGLOBIN GLYCOSYLATED A1C: CPT

## 2023-09-19 PROCEDURE — 1170F FXNL STATUS ASSESSED: CPT | Performed by: INTERNAL MEDICINE

## 2023-09-19 PROCEDURE — 1160F RVW MEDS BY RX/DR IN RCRD: CPT | Performed by: INTERNAL MEDICINE

## 2023-09-19 PROCEDURE — 99214 OFFICE O/P EST MOD 30 MIN: CPT | Performed by: INTERNAL MEDICINE

## 2023-09-19 ASSESSMENT — PATIENT HEALTH QUESTIONNAIRE - PHQ9
1. LITTLE INTEREST OR PLEASURE IN DOING THINGS: NOT AT ALL
2. FEELING DOWN, DEPRESSED OR HOPELESS: NOT AT ALL
SUM OF ALL RESPONSES TO PHQ9 QUESTIONS 1 AND 2: 0

## 2023-09-19 ASSESSMENT — ACTIVITIES OF DAILY LIVING (ADL)
TAKING_MEDICATION: INDEPENDENT
MANAGING_FINANCES: INDEPENDENT
BATHING: INDEPENDENT
DOING_HOUSEWORK: INDEPENDENT
GROCERY_SHOPPING: INDEPENDENT
DRESSING: INDEPENDENT

## 2023-09-19 NOTE — PROGRESS NOTES
Medicare Wellness Billing Compliance Satisfied    *This is a visual tool to show completion of required items on the day of the visit. Green checks will only appear on the date of visit.    Review all medications by prescribing practitioner or clinical pharmacist (such as prescriptions, OTCs, herbal therapies and supplements) documented in the medical record    Past Medical, Surgical, and Family History reviewed and updated in chart    Tobacco Use Reviewed    Alcohol Use Reviewed    Illicit Drug Use Reviewed    PHQ2/9    Falls in Last Year Reviewed    Home Safety Risk Factors Reviewed    Cognitive Impairment Reviewed    Patient Self Assessment and Health Status    Current Diet Reviewed    Exercise Frequency    ADL - Hearing Impairment    ADL - Bathing    ADL - Dressing    ADL - Walks in Home    IADL - Managing Finances    IADL - Grocery Shopping    IADL - Taking Medications    IADL - Doing Housework    Subjective   Reason for Visit: Francheska Ochoa is an 77 y.o. female here for a Medicare Wellness visit and follow up.  She is doing well  BG numbers are good  BP has been stable      Past Medical, Surgical, and Family History reviewed and updated in chart.    Reviewed all medications by prescribing practitioner or clinical pharmacist (such as prescriptions, OTCs, herbal therapies and supplements) and documented in the medical record.    HPI    Patient Care Team:  Shruti Murphy MD as PCP - General  Shruti Murphy MD as PCP - [facility]     Review of Systems   Constitutional: Negative.    Respiratory: Negative.     Cardiovascular: Negative.    Gastrointestinal: Negative.        Objective   Vitals:  /80   Wt 79.8 kg (176 lb)   BMI 31.18 kg/m²       Physical Exam  Constitutional:       Appearance: She is well-developed.   Cardiovascular:      Rate and Rhythm: Normal rate and regular rhythm.      Heart sounds: Normal heart sounds. No murmur heard.  Pulmonary:       Effort: Pulmonary effort is normal.      Breath sounds: Normal breath sounds.   Abdominal:      General: Bowel sounds are normal.      Palpations: Abdomen is soft.         Assessment/Plan   Problem List Items Addressed This Visit       Chronic kidney disease    Relevant Orders    Comprehensive Metabolic Panel    Essential hypertension    Hyperlipidemia    DM type 2 with diabetic mixed hyperlipidemia (CMS/HCC)    Relevant Orders    Hemoglobin A1C    Malignant hypertension     Other Visit Diagnoses       Routine general medical examination at health care facility        Need for influenza vaccination        Relevant Orders    Flu vaccine, quadrivalent, high-dose, preservative free, age 65y+ (FLUZONE)             HTN.  Well controlled.  Continue with current medications.  Check BP at home daily.  Report to us if the numbers are higher than 130/80.        HLD  Stable  Continue with statins       Diabetes Mellitus type II, under good  control.  1. Rx changes none  2. Education: Reviewed ‘ABCs’ of diabetes management (respective goals in parentheses):  A1C (<7), blood pressure (<130/80), and cholesterol (LDL <100).  3. Compliance at present is estimated to be good. Efforts to improve compliance were discussed.  4. Follow up in 3 months    May be able to change medications since her A1C is much better now  Has been losing weight with Mounjaro  Feeling much better    CKD  Repeat CMP  No signs of worsening function  Follow up with nephrologist     Flu vaccine given

## 2023-10-30 DIAGNOSIS — D64.9 ANEMIA, UNSPECIFIED TYPE: ICD-10-CM

## 2023-10-30 RX ORDER — FERROUS GLUCONATE 324(38)MG
TABLET ORAL
Qty: 180 TABLET | Refills: 0 | Status: SHIPPED | OUTPATIENT
Start: 2023-10-30 | End: 2023-12-19 | Stop reason: SDUPTHER

## 2023-11-07 ENCOUNTER — PHARMACY VISIT (OUTPATIENT)
Dept: PHARMACY | Facility: CLINIC | Age: 78
End: 2023-11-07
Payer: COMMERCIAL

## 2023-11-07 PROCEDURE — RXMED WILLOW AMBULATORY MEDICATION CHARGE

## 2023-11-24 DIAGNOSIS — E66.09 OBESITY DUE TO EXCESS CALORIES, UNSPECIFIED CLASSIFICATION, UNSPECIFIED WHETHER SERIOUS COMORBIDITY PRESENT: ICD-10-CM

## 2023-11-24 DIAGNOSIS — I10 ESSENTIAL HYPERTENSION: ICD-10-CM

## 2023-11-24 DIAGNOSIS — E78.5 HYPERLIPIDEMIA, UNSPECIFIED HYPERLIPIDEMIA TYPE: ICD-10-CM

## 2023-11-24 DIAGNOSIS — E11.69 DM TYPE 2 WITH DIABETIC MIXED HYPERLIPIDEMIA (MULTI): ICD-10-CM

## 2023-11-24 DIAGNOSIS — E78.2 DM TYPE 2 WITH DIABETIC MIXED HYPERLIPIDEMIA (MULTI): ICD-10-CM

## 2023-11-25 RX ORDER — AMLODIPINE BESYLATE 10 MG/1
10 TABLET ORAL DAILY
Qty: 90 TABLET | Refills: 3 | Status: SHIPPED | OUTPATIENT
Start: 2023-11-25 | End: 2023-12-19 | Stop reason: SDUPTHER

## 2023-11-29 ENCOUNTER — PHARMACY VISIT (OUTPATIENT)
Dept: PHARMACY | Facility: CLINIC | Age: 78
End: 2023-11-29
Payer: COMMERCIAL

## 2023-11-29 PROCEDURE — RXMED WILLOW AMBULATORY MEDICATION CHARGE

## 2023-12-11 DIAGNOSIS — E66.09 OBESITY DUE TO EXCESS CALORIES, UNSPECIFIED CLASSIFICATION, UNSPECIFIED WHETHER SERIOUS COMORBIDITY PRESENT: ICD-10-CM

## 2023-12-11 DIAGNOSIS — E78.5 HYPERLIPIDEMIA, UNSPECIFIED HYPERLIPIDEMIA TYPE: ICD-10-CM

## 2023-12-11 DIAGNOSIS — E11.69 DM TYPE 2 WITH DIABETIC MIXED HYPERLIPIDEMIA (MULTI): ICD-10-CM

## 2023-12-11 DIAGNOSIS — I10 ESSENTIAL HYPERTENSION: ICD-10-CM

## 2023-12-11 DIAGNOSIS — E78.2 DM TYPE 2 WITH DIABETIC MIXED HYPERLIPIDEMIA (MULTI): ICD-10-CM

## 2023-12-11 RX ORDER — FENOFIBRATE 145 MG/1
145 TABLET, FILM COATED ORAL DAILY
Qty: 90 TABLET | Refills: 3 | Status: SHIPPED | OUTPATIENT
Start: 2023-12-11 | End: 2023-12-19 | Stop reason: SDUPTHER

## 2023-12-19 ENCOUNTER — OFFICE VISIT (OUTPATIENT)
Dept: PRIMARY CARE | Facility: CLINIC | Age: 78
End: 2023-12-19
Payer: MEDICARE

## 2023-12-19 VITALS — SYSTOLIC BLOOD PRESSURE: 118 MMHG | DIASTOLIC BLOOD PRESSURE: 60 MMHG | BODY MASS INDEX: 30.82 KG/M2 | WEIGHT: 174 LBS

## 2023-12-19 DIAGNOSIS — E66.09 OBESITY DUE TO EXCESS CALORIES, UNSPECIFIED CLASSIFICATION, UNSPECIFIED WHETHER SERIOUS COMORBIDITY PRESENT: ICD-10-CM

## 2023-12-19 DIAGNOSIS — M85.80 OSTEOPENIA, UNSPECIFIED LOCATION: ICD-10-CM

## 2023-12-19 DIAGNOSIS — I10 ESSENTIAL HYPERTENSION: ICD-10-CM

## 2023-12-19 DIAGNOSIS — E78.5 HYPERLIPIDEMIA, UNSPECIFIED HYPERLIPIDEMIA TYPE: ICD-10-CM

## 2023-12-19 DIAGNOSIS — E78.2 DM TYPE 2 WITH DIABETIC MIXED HYPERLIPIDEMIA (MULTI): ICD-10-CM

## 2023-12-19 DIAGNOSIS — M81.0 AGE-RELATED OSTEOPOROSIS WITHOUT CURRENT PATHOLOGICAL FRACTURE: ICD-10-CM

## 2023-12-19 DIAGNOSIS — R94.31 ABNORMAL ELECTROCARDIOGRAM (ECG) (EKG): ICD-10-CM

## 2023-12-19 DIAGNOSIS — Z12.31 ENCOUNTER FOR SCREENING MAMMOGRAM FOR MALIGNANT NEOPLASM OF BREAST: Primary | ICD-10-CM

## 2023-12-19 DIAGNOSIS — D64.9 ANEMIA, UNSPECIFIED TYPE: ICD-10-CM

## 2023-12-19 DIAGNOSIS — E11.69 DM TYPE 2 WITH DIABETIC MIXED HYPERLIPIDEMIA (MULTI): ICD-10-CM

## 2023-12-19 DIAGNOSIS — I49.3 PVC (PREMATURE VENTRICULAR CONTRACTION): ICD-10-CM

## 2023-12-19 DIAGNOSIS — R79.89 ELEVATED TROPONIN: ICD-10-CM

## 2023-12-19 PROCEDURE — 3074F SYST BP LT 130 MM HG: CPT | Performed by: INTERNAL MEDICINE

## 2023-12-19 PROCEDURE — 1159F MED LIST DOCD IN RCRD: CPT | Performed by: INTERNAL MEDICINE

## 2023-12-19 PROCEDURE — 1160F RVW MEDS BY RX/DR IN RCRD: CPT | Performed by: INTERNAL MEDICINE

## 2023-12-19 PROCEDURE — 1036F TOBACCO NON-USER: CPT | Performed by: INTERNAL MEDICINE

## 2023-12-19 PROCEDURE — 99214 OFFICE O/P EST MOD 30 MIN: CPT | Performed by: INTERNAL MEDICINE

## 2023-12-19 PROCEDURE — 3078F DIAST BP <80 MM HG: CPT | Performed by: INTERNAL MEDICINE

## 2023-12-19 RX ORDER — LANCETS 26 GAUGE
1 EACH MISCELLANEOUS 2 TIMES DAILY
Qty: 100 EACH | Refills: 1 | Status: SHIPPED | OUTPATIENT
Start: 2023-12-19

## 2023-12-19 RX ORDER — METOPROLOL TARTRATE 100 MG/1
100 TABLET ORAL
Qty: 90 TABLET | Refills: 1 | Status: SHIPPED | OUTPATIENT
Start: 2023-12-19 | End: 2024-04-09 | Stop reason: SDUPTHER

## 2023-12-19 RX ORDER — CHOLECALCIFEROL (VITAMIN D3) 50 MCG
50 TABLET ORAL DAILY
Qty: 90 TABLET | Refills: 1 | Status: SHIPPED | OUTPATIENT
Start: 2023-12-19 | End: 2024-04-09 | Stop reason: SDUPTHER

## 2023-12-19 RX ORDER — FENOFIBRATE 145 MG/1
145 TABLET, FILM COATED ORAL DAILY
Qty: 90 TABLET | Refills: 3 | Status: SHIPPED | OUTPATIENT
Start: 2023-12-19 | End: 2024-04-09 | Stop reason: SDUPTHER

## 2023-12-19 RX ORDER — AMLODIPINE BESYLATE 10 MG/1
10 TABLET ORAL DAILY
Qty: 90 TABLET | Refills: 3 | Status: SHIPPED | OUTPATIENT
Start: 2023-12-19 | End: 2024-04-09 | Stop reason: SDUPTHER

## 2023-12-19 RX ORDER — FERROUS GLUCONATE 324(38)MG
1 TABLET ORAL 2 TIMES DAILY
Qty: 180 TABLET | Refills: 2 | Status: SHIPPED | OUTPATIENT
Start: 2023-12-19 | End: 2024-04-09 | Stop reason: SDUPTHER

## 2023-12-19 RX ORDER — OLMESARTAN MEDOXOMIL 40 MG/1
40 TABLET ORAL DAILY
Qty: 90 TABLET | Refills: 1 | Status: SHIPPED | OUTPATIENT
Start: 2023-12-19 | End: 2024-04-09 | Stop reason: SDUPTHER

## 2023-12-19 RX ORDER — SIMVASTATIN 20 MG/1
20 TABLET, FILM COATED ORAL
Qty: 90 TABLET | Refills: 1 | Status: SHIPPED | OUTPATIENT
Start: 2023-12-19 | End: 2024-04-09 | Stop reason: SDUPTHER

## 2023-12-19 RX ORDER — CITALOPRAM 20 MG/1
20 TABLET, FILM COATED ORAL
Qty: 90 TABLET | Refills: 1 | Status: SHIPPED | OUTPATIENT
Start: 2023-12-19 | End: 2024-04-09 | Stop reason: SDUPTHER

## 2023-12-19 RX ORDER — BLOOD SUGAR DIAGNOSTIC
1 STRIP MISCELLANEOUS 2 TIMES DAILY
Qty: 100 STRIP | Refills: 2 | Status: SHIPPED | OUTPATIENT
Start: 2023-12-19 | End: 2024-04-09 | Stop reason: SDUPTHER

## 2023-12-19 ASSESSMENT — PATIENT HEALTH QUESTIONNAIRE - PHQ9
2. FEELING DOWN, DEPRESSED OR HOPELESS: NOT AT ALL
1. LITTLE INTEREST OR PLEASURE IN DOING THINGS: NOT AT ALL
SUM OF ALL RESPONSES TO PHQ9 QUESTIONS 1 AND 2: 0

## 2023-12-19 NOTE — PROGRESS NOTES
I saw and evaluated the patient. I personally obtained the key and critical portions of the history and physical exam or was physically present for key and critical portions performed by the resident/fellow. I reviewed the resident/fellow's documentation and discussed the patient with the resident/fellow. I agree with the resident/fellow's medical decision making as documented in the note.    Shruti Murphy MD

## 2023-12-19 NOTE — PROGRESS NOTES
MRN: [id number]     Subjective   Patient ID: Francheska Ochoa is a 78 y.o. female who presents for Med Refill.  HPI  Mrs. Francheska Ochoa is an 79 yo female patient with PMHx of HTN, HLD, DM II and CKD who presented to the office for follow up visit, requesting refills and follow up post ED visit. Patient was not feeling well, last Friday and went to [facility] she was tachycardic and her BP was high and she felt hot flashes in her chest, they did an EKG that showed sinus rhythm, occasional PVC, her Trop was slightly elevated 28-31, her UA showed bacteriuria and pyuria although she had no urinary symptoms they gave her a script for Keflex that she is finishing today, since that day she is feeling well and had no other similar episodes, she is denying now any chest pain or SOB at rest or with ambulation. Otherwise she is doing well, requesting refill, happy that she lost 20 lbs since started Mounjaro. She is checking her BP at home 3 times a day since last Friday and has been within normal range. BG good at home between 85 and 110.    Review of Systems  - CONSTITUTIONAL: Denies weight loss, fever and chills.  - HEENT: Denies changes in vision and hearing.  - RESPIRATORY: Denies SOB and cough.  - CV: Denies palpitations and CP.   - GI: Denies abdominal pain, nausea, vomiting and diarrhea.  - : Denies dysuria and urinary frequency.  - MSK: Denies myalgia and  joint pain.  - SKIN: Denies rash and pruritus.  - NEUROLOGICAL: Denies headache and syncope.  - PSYCHIATRIC: Denies recent changes in mood. Denies anxiety and depression.    Objective   Physical Exam  Constitutional: patient is alert and cooperative with exam  skin: dry and warm  Eyes: EOMI and clear sclera  ENMT: moist mucous membranes  Head/Neck: normal neck, no JVD and trachea midline  Respiratory/Thorax: Clear to auscultation bilaterally, no wheezing or crackles appreciated  Cardiovascular: regular rate and rhythm, S1 and S2 present, no murmurs  heard  Gastrointestinal: bowel sounds present, no pain or tenderness upon palpation, soft and nondistended  Extremities: +2 radial, posterior tibial, and pedal pulse, no lower extremity edema noted  Neurological: A&Ox3 no focal deficit   Visit Vitals  /60          Assessment/Plan     Mrs. Francheska Ochoa is an 79 yo female patient with PMHx of HTN, HLD, DM II and CKD who presented to the office for follow up visit, requesting refills and follow up post ED visit.    #Tachycardia, HTN  - Seen patient high risk for CV events, and seen slightly elevated troponin and PVC on EKG will order stress echo and holter EKG  - BP in office today 118/60  - continue amlodipine 10 mg daily, Olmesartan 40 mg daily and metoprolol 100 mg daily    #DM II:  - Last HbA1c 5.2 on 9/19/2023   - BG under control  - seen low HbA1c and patient lost 20 lbs will decrease Tirzepatide dose to 5 mg weekly refill sent    #HLD  - Continue Simvastatin 20 mg daily    #CKD  -Kidney function stable last egFR 35 in 9/2023  -follows with nephrology  - on ARBs, GLP1 and iron supplementation     #Depression  - under control  - continue citalopram 20 mg daily    Health maintenance:  -Mammogram 12/19/2022 Normal > due now ordered  -Colonoscopy: 10/5/2019 showed diverticulosis repeat in 5 years 10/2024  -Dexa: 12/2021 T score -0.2 > due now ordered  - immunization uptodate   - Lab: uptodate, will order at next visit    Dispo: F/U in  3-4 months.

## 2023-12-20 ENCOUNTER — TELEPHONE (OUTPATIENT)
Dept: PRIMARY CARE | Facility: CLINIC | Age: 78
End: 2023-12-20
Payer: MEDICARE

## 2024-01-02 ENCOUNTER — APPOINTMENT (OUTPATIENT)
Dept: CARDIOLOGY | Facility: HOSPITAL | Age: 79
End: 2024-01-02
Payer: COMMERCIAL

## 2024-01-11 ENCOUNTER — HOSPITAL ENCOUNTER (OUTPATIENT)
Dept: CARDIOLOGY | Facility: HOSPITAL | Age: 79
End: 2024-01-11
Payer: MEDICARE

## 2024-01-11 ENCOUNTER — HOSPITAL ENCOUNTER (OUTPATIENT)
Dept: CARDIOLOGY | Facility: HOSPITAL | Age: 79
Discharge: HOME | End: 2024-01-11
Payer: MEDICARE

## 2024-01-11 DIAGNOSIS — I49.3 PVC (PREMATURE VENTRICULAR CONTRACTION): Primary | ICD-10-CM

## 2024-01-11 DIAGNOSIS — E78.2 DM TYPE 2 WITH DIABETIC MIXED HYPERLIPIDEMIA (MULTI): ICD-10-CM

## 2024-01-11 DIAGNOSIS — R79.89 ELEVATED TROPONIN: ICD-10-CM

## 2024-01-11 DIAGNOSIS — E78.5 HYPERLIPIDEMIA, UNSPECIFIED HYPERLIPIDEMIA TYPE: ICD-10-CM

## 2024-01-11 DIAGNOSIS — R94.31 ABNORMAL ELECTROCARDIOGRAM (ECG) (EKG): ICD-10-CM

## 2024-01-11 DIAGNOSIS — I49.3 PVC (PREMATURE VENTRICULAR CONTRACTION): ICD-10-CM

## 2024-01-11 DIAGNOSIS — E11.69 DM TYPE 2 WITH DIABETIC MIXED HYPERLIPIDEMIA (MULTI): ICD-10-CM

## 2024-01-11 PROCEDURE — 93246 EXT ECG>7D<15D RECORDING: CPT

## 2024-01-11 PROCEDURE — 93248 EXT ECG>7D<15D REV&INTERPJ: CPT | Performed by: STUDENT IN AN ORGANIZED HEALTH CARE EDUCATION/TRAINING PROGRAM

## 2024-01-11 RX ORDER — REGADENOSON 0.08 MG/ML
0.4 INJECTION, SOLUTION INTRAVENOUS
Status: CANCELLED | OUTPATIENT
Start: 2024-01-11

## 2024-01-16 ENCOUNTER — HOSPITAL ENCOUNTER (OUTPATIENT)
Dept: RADIOLOGY | Facility: HOSPITAL | Age: 79
Discharge: HOME | End: 2024-01-16
Payer: MEDICARE

## 2024-01-16 DIAGNOSIS — Z12.31 ENCOUNTER FOR SCREENING MAMMOGRAM FOR MALIGNANT NEOPLASM OF BREAST: ICD-10-CM

## 2024-01-16 DIAGNOSIS — M85.80 OSTEOPENIA, UNSPECIFIED LOCATION: ICD-10-CM

## 2024-01-16 DIAGNOSIS — M81.0 AGE-RELATED OSTEOPOROSIS WITHOUT CURRENT PATHOLOGICAL FRACTURE: ICD-10-CM

## 2024-01-16 PROCEDURE — 77085 DXA BONE DENSITY AXL VRT FX: CPT

## 2024-01-16 PROCEDURE — 77085 DXA BONE DENSITY AXL VRT FX: CPT | Performed by: RADIOLOGY

## 2024-01-26 ENCOUNTER — HOSPITAL ENCOUNTER (OUTPATIENT)
Dept: RADIOLOGY | Facility: HOSPITAL | Age: 79
Discharge: HOME | End: 2024-01-26
Payer: MEDICARE

## 2024-01-26 ENCOUNTER — HOSPITAL ENCOUNTER (OUTPATIENT)
Dept: CARDIOLOGY | Facility: HOSPITAL | Age: 79
Discharge: HOME | End: 2024-01-26
Payer: MEDICARE

## 2024-01-26 DIAGNOSIS — I49.3 PVC (PREMATURE VENTRICULAR CONTRACTION): ICD-10-CM

## 2024-01-26 DIAGNOSIS — R94.31 ABNORMAL ELECTROCARDIOGRAM (ECG) (EKG): ICD-10-CM

## 2024-01-26 PROCEDURE — A9502 TC99M TETROFOSMIN: HCPCS | Performed by: INTERNAL MEDICINE

## 2024-01-26 PROCEDURE — 3430000001 HC RX 343 DIAGNOSTIC RADIOPHARMACEUTICALS: Performed by: INTERNAL MEDICINE

## 2024-01-26 PROCEDURE — 77067 SCR MAMMO BI INCL CAD: CPT

## 2024-01-26 PROCEDURE — 78452 HT MUSCLE IMAGE SPECT MULT: CPT | Performed by: STUDENT IN AN ORGANIZED HEALTH CARE EDUCATION/TRAINING PROGRAM

## 2024-01-26 PROCEDURE — 93016 CV STRESS TEST SUPVJ ONLY: CPT | Performed by: INTERNAL MEDICINE

## 2024-01-26 PROCEDURE — 77063 BREAST TOMOSYNTHESIS BI: CPT | Performed by: RADIOLOGY

## 2024-01-26 PROCEDURE — 93017 CV STRESS TEST TRACING ONLY: CPT

## 2024-01-26 PROCEDURE — 2500000004 HC RX 250 GENERAL PHARMACY W/ HCPCS (ALT 636 FOR OP/ED): Performed by: INTERNAL MEDICINE

## 2024-01-26 PROCEDURE — 77067 SCR MAMMO BI INCL CAD: CPT | Performed by: RADIOLOGY

## 2024-01-26 PROCEDURE — 78452 HT MUSCLE IMAGE SPECT MULT: CPT

## 2024-01-26 RX ORDER — REGADENOSON 0.08 MG/ML
0.4 INJECTION, SOLUTION INTRAVENOUS
Status: COMPLETED | OUTPATIENT
Start: 2024-01-26 | End: 2024-01-26

## 2024-01-26 RX ADMIN — REGADENOSON 0.4 MG: 0.08 INJECTION, SOLUTION INTRAVENOUS at 11:35

## 2024-01-26 RX ADMIN — TETROFOSMIN 11.4 MILLICURIE: 0.23 INJECTION, POWDER, LYOPHILIZED, FOR SOLUTION INTRAVENOUS at 09:40

## 2024-01-26 RX ADMIN — TETROFOSMIN 32.4 MILLICURIE: 0.23 INJECTION, POWDER, LYOPHILIZED, FOR SOLUTION INTRAVENOUS at 11:38

## 2024-02-14 ENCOUNTER — TELEPHONE (OUTPATIENT)
Dept: PRIMARY CARE | Facility: CLINIC | Age: 79
End: 2024-02-14

## 2024-02-14 ENCOUNTER — LAB (OUTPATIENT)
Dept: LAB | Facility: LAB | Age: 79
End: 2024-02-14
Payer: MEDICARE

## 2024-02-14 DIAGNOSIS — E78.2 DM TYPE 2 WITH DIABETIC MIXED HYPERLIPIDEMIA (MULTI): Primary | ICD-10-CM

## 2024-02-14 DIAGNOSIS — E11.9 TYPE 2 DIABETES MELLITUS WITHOUT COMPLICATIONS (MULTI): ICD-10-CM

## 2024-02-14 DIAGNOSIS — N32.81 OVERACTIVE BLADDER: Primary | ICD-10-CM

## 2024-02-14 DIAGNOSIS — E11.69 TYPE 2 DIABETES MELLITUS WITH OTHER SPECIFIED COMPLICATION (MULTI): ICD-10-CM

## 2024-02-14 DIAGNOSIS — E11.69 DM TYPE 2 WITH DIABETIC MIXED HYPERLIPIDEMIA (MULTI): Primary | ICD-10-CM

## 2024-02-14 DIAGNOSIS — N18.9 CHRONIC KIDNEY DISEASE, UNSPECIFIED: ICD-10-CM

## 2024-02-14 DIAGNOSIS — D64.9 ANEMIA, UNSPECIFIED: ICD-10-CM

## 2024-02-14 LAB
ALBUMIN SERPL BCP-MCNC: 3.8 G/DL (ref 3.4–5)
ANION GAP SERPL CALC-SCNC: 13 MMOL/L (ref 10–20)
APPEARANCE UR: CLEAR
BILIRUB UR STRIP.AUTO-MCNC: NEGATIVE MG/DL
BUN SERPL-MCNC: 38 MG/DL (ref 6–23)
CALCIUM SERPL-MCNC: 9.3 MG/DL (ref 8.6–10.3)
CHLORIDE SERPL-SCNC: 103 MMOL/L (ref 98–107)
CO2 SERPL-SCNC: 27 MMOL/L (ref 21–32)
COLOR UR: YELLOW
CREAT SERPL-MCNC: 1.6 MG/DL (ref 0.5–1.05)
CREAT UR-MCNC: 44.3 MG/DL (ref 20–320)
EGFRCR SERPLBLD CKD-EPI 2021: 32 ML/MIN/1.73M*2
ERYTHROCYTE [DISTWIDTH] IN BLOOD BY AUTOMATED COUNT: 14.5 % (ref 11.5–14.5)
FERRITIN SERPL-MCNC: 181 NG/ML (ref 8–150)
GLUCOSE SERPL-MCNC: 93 MG/DL (ref 74–99)
GLUCOSE UR STRIP.AUTO-MCNC: NEGATIVE MG/DL
HCT VFR BLD AUTO: 38 % (ref 36–46)
HGB BLD-MCNC: 11.9 G/DL (ref 12–16)
IRON SATN MFR SERPL: 14 % (ref 25–45)
IRON SERPL-MCNC: 56 UG/DL (ref 35–150)
KETONES UR STRIP.AUTO-MCNC: NEGATIVE MG/DL
LEUKOCYTE ESTERASE UR QL STRIP.AUTO: ABNORMAL
MCH RBC QN AUTO: 32.3 PG (ref 26–34)
MCHC RBC AUTO-ENTMCNC: 31.3 G/DL (ref 32–36)
MCV RBC AUTO: 103 FL (ref 80–100)
NITRITE UR QL STRIP.AUTO: NEGATIVE
NRBC BLD-RTO: 0 /100 WBCS (ref 0–0)
PH UR STRIP.AUTO: 6 [PH]
PHOSPHATE SERPL-MCNC: 4.1 MG/DL (ref 2.5–4.9)
PLATELET # BLD AUTO: 200 X10*3/UL (ref 150–450)
POTASSIUM SERPL-SCNC: 4.4 MMOL/L (ref 3.5–5.3)
PROT UR STRIP.AUTO-MCNC: NEGATIVE MG/DL
PROT UR-ACNC: 4 MG/DL (ref 5–24)
PROT/CREAT UR: 0.09 MG/MG CREAT (ref 0–0.17)
RBC # BLD AUTO: 3.68 X10*6/UL (ref 4–5.2)
RBC # UR STRIP.AUTO: NEGATIVE /UL
RBC #/AREA URNS AUTO: NORMAL /HPF
SODIUM SERPL-SCNC: 139 MMOL/L (ref 136–145)
SP GR UR STRIP.AUTO: 1.01
TIBC SERPL-MCNC: 390 UG/DL (ref 240–445)
UIBC SERPL-MCNC: 334 UG/DL (ref 110–370)
URATE SERPL-MCNC: 6.3 MG/DL (ref 2.3–6.7)
UROBILINOGEN UR STRIP.AUTO-MCNC: <2 MG/DL
WBC # BLD AUTO: 6.4 X10*3/UL (ref 4.4–11.3)
WBC #/AREA URNS AUTO: NORMAL /HPF

## 2024-02-14 PROCEDURE — 83540 ASSAY OF IRON: CPT

## 2024-02-14 PROCEDURE — 83550 IRON BINDING TEST: CPT

## 2024-02-14 PROCEDURE — 84165 PROTEIN E-PHORESIS SERUM: CPT

## 2024-02-14 PROCEDURE — 81001 URINALYSIS AUTO W/SCOPE: CPT

## 2024-02-14 PROCEDURE — 36415 COLL VENOUS BLD VENIPUNCTURE: CPT

## 2024-02-14 PROCEDURE — 83970 ASSAY OF PARATHORMONE: CPT

## 2024-02-14 PROCEDURE — 82306 VITAMIN D 25 HYDROXY: CPT

## 2024-02-14 PROCEDURE — 84550 ASSAY OF BLOOD/URIC ACID: CPT

## 2024-02-14 PROCEDURE — 82570 ASSAY OF URINE CREATININE: CPT

## 2024-02-14 PROCEDURE — 84155 ASSAY OF PROTEIN SERUM: CPT

## 2024-02-14 PROCEDURE — 82043 UR ALBUMIN QUANTITATIVE: CPT

## 2024-02-14 PROCEDURE — 85027 COMPLETE CBC AUTOMATED: CPT

## 2024-02-14 PROCEDURE — 80069 RENAL FUNCTION PANEL: CPT

## 2024-02-14 PROCEDURE — 84156 ASSAY OF PROTEIN URINE: CPT

## 2024-02-14 PROCEDURE — 84165 PROTEIN E-PHORESIS SERUM: CPT | Performed by: INTERNAL MEDICINE

## 2024-02-14 PROCEDURE — 82728 ASSAY OF FERRITIN: CPT

## 2024-02-14 RX ORDER — TIRZEPATIDE 5 MG/.5ML
5 INJECTION, SOLUTION SUBCUTANEOUS
Qty: 2 ML | Refills: 3 | Status: SHIPPED | OUTPATIENT
Start: 2024-02-14 | End: 2024-04-09 | Stop reason: SDUPTHER

## 2024-02-15 LAB
25(OH)D3 SERPL-MCNC: 37 NG/ML (ref 30–100)
CREAT UR-MCNC: 44.8 MG/DL (ref 20–320)
MICROALBUMIN UR-MCNC: 7.5 MG/L
MICROALBUMIN/CREAT UR: 16.7 UG/MG CREAT
PROT SERPL-MCNC: 6.2 G/DL (ref 6.4–8.2)
PTH-INTACT SERPL-MCNC: 31.7 PG/ML (ref 18.5–88)

## 2024-02-16 LAB
ALBUMIN: 3.8 G/DL (ref 3.4–5)
ALPHA 1 GLOBULIN: 0.2 G/DL (ref 0.2–0.6)
ALPHA 2 GLOBULIN: 0.6 G/DL (ref 0.4–1.1)
BETA GLOBULIN: 0.7 G/DL (ref 0.5–1.2)
GAMMA GLOBULIN: 0.8 G/DL (ref 0.5–1.4)
PATH REVIEW-SERUM PROTEIN ELECTROPHORESIS: NORMAL
PROTEIN ELECTROPHORESIS COMMENT: NORMAL

## 2024-02-26 ENCOUNTER — OFFICE VISIT (OUTPATIENT)
Dept: NEPHROLOGY | Facility: CLINIC | Age: 79
End: 2024-02-26
Payer: MEDICARE

## 2024-02-26 VITALS
TEMPERATURE: 97.1 F | WEIGHT: 180.6 LBS | SYSTOLIC BLOOD PRESSURE: 127 MMHG | HEIGHT: 63 IN | RESPIRATION RATE: 16 BRPM | BODY MASS INDEX: 32 KG/M2 | DIASTOLIC BLOOD PRESSURE: 68 MMHG | OXYGEN SATURATION: 96 % | HEART RATE: 57 BPM

## 2024-02-26 DIAGNOSIS — E11.69 DM TYPE 2 WITH DIABETIC MIXED HYPERLIPIDEMIA (MULTI): ICD-10-CM

## 2024-02-26 DIAGNOSIS — E55.9 VITAMIN D DEFICIENCY: ICD-10-CM

## 2024-02-26 DIAGNOSIS — I10 ESSENTIAL HYPERTENSION: ICD-10-CM

## 2024-02-26 DIAGNOSIS — D63.8 ANEMIA IN OTHER CHRONIC DISEASES CLASSIFIED ELSEWHERE: ICD-10-CM

## 2024-02-26 DIAGNOSIS — E78.00 PURE HYPERCHOLESTEROLEMIA: ICD-10-CM

## 2024-02-26 DIAGNOSIS — E78.2 DM TYPE 2 WITH DIABETIC MIXED HYPERLIPIDEMIA (MULTI): ICD-10-CM

## 2024-02-26 DIAGNOSIS — N18.32 STAGE 3B CHRONIC KIDNEY DISEASE (MULTI): Primary | ICD-10-CM

## 2024-02-26 PROCEDURE — 3078F DIAST BP <80 MM HG: CPT | Performed by: INTERNAL MEDICINE

## 2024-02-26 PROCEDURE — 1160F RVW MEDS BY RX/DR IN RCRD: CPT | Performed by: INTERNAL MEDICINE

## 2024-02-26 PROCEDURE — 99215 OFFICE O/P EST HI 40 MIN: CPT | Performed by: INTERNAL MEDICINE

## 2024-02-26 PROCEDURE — 1159F MED LIST DOCD IN RCRD: CPT | Performed by: INTERNAL MEDICINE

## 2024-02-26 PROCEDURE — 3074F SYST BP LT 130 MM HG: CPT | Performed by: INTERNAL MEDICINE

## 2024-02-26 PROCEDURE — 1036F TOBACCO NON-USER: CPT | Performed by: INTERNAL MEDICINE

## 2024-02-26 NOTE — PROGRESS NOTES
Subjective       Francheska Ochoa is a 78 y.o. female who has past medical history of type 2 diabetes well-controlled-hypertension, nephrolithiasis and chronic kidney disease was coming to see me today for follow-up    Last office visit was in August 22, 2023.  Patient came today for follow-up.  She came alone.  No kidney related complaints or concerns.  Blood pressure is in target.  She follows low-salt diet.  She is adherent to medications.  She denies being on Lasix anymore.  Most recent blood work done in February 2024 was reviewed and showed stable serum creatinine and GFR.  Within normal extra lites.  She continues to be iron deficient-she is currently on oral iron.  No albuminuria per spot test ACR    Prior notes     Last office visit February 2023. We discussed chronic kidney disease. We discussed conservative measures. We started oxybutynin for overactive bladder and urine urgency. Evelyne came alone today. No kidney related complaints or concerns. She ran out of oxybutynin and she experiences some urinary urgency. No leg swelling or shortness of breath. No recent kidney stones. She had blood work done in May 2023 (2 months ago) that showed slightly worsening serum creatinine 2.1 and GFR 23 from 25 prior. Today I discussed with her that the reason for her worsening kidney function is unclear at this time. We discussed possible kidney biopsy-we decided to defer at this time. Urinalysis bland with no albumin. A1c is in target 5.5.     Per prior notes           Patient came today alone. She reports 5-6 years ago she had a procedure that involved a nephrologist but she does not recall the whole reason. She does have a past medical history of kidney stones, with ED visits in March 2022. A CT scan confirmed that this stone was causing obstruction in the L kidney. She is no longer having symptoms at this time. She denies dysuria or hematuria. She does claim small urinary leakage and increased frequency/urgency. We  "will start Oxybutynin for the frequency/urgency. She denies taking NSAIDs for pain. She does not have a family history of kidney stones. She endorses eating healthy, with fruits and vegetables. We educated her on reducing salt in diet. We discussed 50-60 ounces of water daily. She is a former smoker but quit 35+ years ago. Bloodwork was obtained and reviewed from December. GFR is 35 and Cr 1.5. We will repeat bloodwork today to have the most up to date labs. Obtain a kidney US at your earliest connivence. Prior UA did not reveal protein leak but she was unable to leave a sample today. Past medical history of DM, HTN, and HLD. (HPI was taken and written by PA student Isaac Boss)       Objective   /68 (BP Location: Left arm, Patient Position: Sitting, BP Cuff Size: Adult)   Pulse 57   Temp 36.2 °C (97.1 °F)   Resp 16   Ht 1.6 m (5' 3\")   Wt 81.9 kg (180 lb 9.6 oz)   SpO2 96%   BMI 31.99 kg/m²   Wt Readings from Last 3 Encounters:   02/26/24 81.9 kg (180 lb 9.6 oz)   12/19/23 78.9 kg (174 lb)   09/19/23 79.8 kg (176 lb)       Physical Exam    General appearance: no distress awake and alert on room air, euvolemic on exam  Eyes: non-icteric  HEENT: atrumatic head, PEERLA, moist mucosa  Skin: no apparent rash  Heart: NSR, S1, S2 normal, no murmur or gallop  Lungs: Symmetrical expansion,CTA bilat no wheezing/crackles  Abdomen: soft, nt/nd, obese  Extremities: no edema bilat  Neuro: No FND,asterixis, no focal deficits noticed        Review of Systems     Constitutional: no fever, no chills, no recent weight gain and no recent weight loss.   Eyes: no blurred vision and no diplopia.   ENT: no hearing loss, no earache, no sore throat, no swollen glands in the neck and no nasal discharge.   Cardiovascular: no chest pain, no palpitations and no lower extremity edema.   Respiratory: no shortness of breath, no chronic cough and no shortness of breath during exertion.   Gastrointestinal: no abdominal pain, no " "constipation, no heartburn, no vomiting, no bloody stools and no change in bowel movements.   Genitourinary: no dysuria and no hematuria.   Musculoskeletal: no arthralgias and no myalgias.   Skin: no rashes and no skin lesions.   Neurological: no headaches and no dizziness.   Psychiatric: no confusion, no depression and no anxiety.   Endocrine: no heat intolerance, no cold intolerance, appetite not increased, no thyroid disorder, no increased urinary frequency and no dry skin.   Hematologic/Lymphatic: does not bleed easily and does not bruise easily.   All other systems have been reviewed and are negative for complaint.         Data Review                   Lab Results   Component Value Date    URICACID 6.3 02/14/2024           Lab Results   Component Value Date    HGBA1C 5.2 09/19/2023                 No lab exists for component: \"CR\", \"PHOSPHORUS\"        Albumin/Creatine Ratio   Date Value Ref Range Status   02/14/2024 16.7 <30.0 ug/mg Creat Final   03/06/2023 13.9 0.0 - 30.0 ug/mg crt Final   02/16/2023 24.5 0.0 - 30.0 ug/mg crt Final            RFP  Recent Labs     02/14/24  1356 09/19/23  1057 06/05/23  1249 02/16/23  1600 12/02/22  1042 05/17/22  0952 03/30/22  1430    142 140 139 141 140 131*   K 4.4 5.1 5.1 4.7 4.6 5.2 4.1    105 103 100 102 103 95*   CO2 27 28 28 31 31 29 27   BUN 38* 30* 60* 53* 40* 37* 32*   CREATININE 1.60* 1.49* 2.10* 1.98* 1.51* 1.38* 1.92*   GLUCOSE 93 80 92 99 115* 95 164*   CALCIUM 9.3 9.8 9.9 9.7 10.0 10.2 9.3   PHOS 4.1  --   --  4.1  --   --   --    EGFR 32*  --   --   --   --   --   --    ANIONGAP 13 14 14 13 13 13 13        Urineanalysis  Recent Labs     02/14/24  1356 02/16/23  1600 12/19/22  1235 05/17/22  0952 03/30/22  1407 03/28/22  1830 05/14/21  1055 11/11/20  1100 05/26/20  0904 11/01/19  0928 01/15/19  0934   COLORU Yellow YELLOW LIGHT YELLOW YELLOW YELLOW STRAW YELLOW STRAW YELLOW YELLOW MARLA   APPEARANCEU Clear HAZY CLEAR CLEAR HAZY CLEAR CLEAR CLEAR " CLEAR CLEAR HAZY   SPECGRAVU 1.011 1.010 1.020 1.009 1.011 1.010 1.025 1.025 1.016 1.016 1.018   JON 6.0 6.0 6.0 5.0 6.0 7.0 5.5 5.5 5.0 5.0 5.0   PROTUR NEGATIVE NEGATIVE NEGATIVE NEGATIVE 100(2+)* NEGATIVE 30 (1+)* NEGATIVE NEGATIVE NEGATIVE NEGATIVE   GLUCOSEU NEGATIVE NEGATIVE NEGATIVE NEGATIVE 50(1+)* 50(1+)* NEGATIVE NEGATIVE NEGATIVE NEGATIVE NEGATIVE   BLOODU NEGATIVE NEGATIVE NEGATIVE NEGATIVE MODERATE(2+)* MODERATE(2+)* NEGATIVE NEGATIVE NEGATIVE NEGATIVE NEGATIVE   KETONESU NEGATIVE NEGATIVE NEGATIVE NEGATIVE NEGATIVE NEGATIVE NEGATIVE NEGATIVE NEGATIVE NEGATIVE NEGATIVE   BILIRUBINU NEGATIVE NEGATIVE NEGATIVE NEGATIVE NEGATIVE NEGATIVE NEGATIVE NEGATIVE NEGATIVE NEGATIVE NEGATIVE   NITRITEU NEGATIVE NEGATIVE NEGATIVE NEGATIVE NEGATIVE NEGATIVE NEGATIVE NEGATIVE NEGATIVE NEGATIVE NEGATIVE   LEUKOCYTESU TRACE* LARGE(3+)* TRACE* MODERATE(2+)* MODERATE(2+)* SMALL(1+)* SMALL (1+)* TRACE* NEGATIVE NEGATIVE SMALL (1+)*       Urine Electrolytes  Recent Labs     02/14/24  1356 03/06/23  0948 02/16/23  1600 12/19/22  1235 05/17/22  0952 03/30/22  1407 03/28/22  1830 12/14/21  0745 05/14/21  1055 11/11/20  1100 05/26/20  0904 11/01/19  0928 01/15/19  0934 07/10/18  0913 10/17/17  0930   CREATU 44.8  44.3 63.4 36.7  --  48.0  --   --  66.1 127.0 72.0 68.2 66.9 122.0 68.5 86.6   PROTUR NEGATIVE  --  NEGATIVE NEGATIVE NEGATIVE 100(2+)* NEGATIVE  --  30 (1+)* NEGATIVE NEGATIVE NEGATIVE NEGATIVE  --   --    UTPCR 0.09  --   --   --   --   --   --   --   --   --   --   --   --   --   --    ALBUMINUR 7.5  --   --   --   --   --   --   --   --   --   --   --   --   --   --    MICROALBCREA 16.7 13.9 24.5  --  SEE COMMENT  --   --  18.6 132.0* 32.2* 32.4* SEE COMMENT 27.5 <7.3 17.0        Urine Micro  Recent Labs     02/14/24  1356 02/16/23  1600 12/19/22  1235 05/17/22  0952 03/30/22  1407 03/28/22  1830 05/14/21  1055 11/11/20  1100 01/15/19  0934   WBCU NONE 17* 1 0-5 43* 0-5 5-20* 0-5 18*   RBCU NONE 7* <1 0-5 10* 0-5  0-5 NONE 1   HYALCASTU  --   --   --   --   --   --  OCCASIONAL*  --  2+*   SQUAMEPIU  --  <1  --  FEW <1 1+  --  FEW 2   BACTERIAU  --   --   --   --  1+* 1+*  --   --  4+*   MUCUSU  --  FEW FEW  --  FEW 1+  --   --  1+        Iron  Recent Labs     02/14/24  1356 02/16/23  1600 11/01/19  0927   IRON 56 58  --    TIBC 390 448*  --    IRONSAT 14* 13*  --    FERRITIN 181* 88 57          Current Outpatient Medications on File Prior to Visit   Medication Sig Dispense Refill    amLODIPine (Norvasc) 10 mg tablet Take 1 tablet (10 mg) by mouth once daily. 90 tablet 3    Autolet lancing device 1 each 2 times a day. CHECK BLOOD SUGAR TWICE DAILY 100 each 1    blood sugar diagnostic (Accu-Chek Guide test strips) strip 1 strip 2 times a day. CHECK BLOOD SUGAR TWICE DAILY 100 strip 2    cholecalciferol (Vitamin D-3) 50 MCG (2000 UT) tablet Take 1 tablet (50 mcg) by mouth once daily. 90 tablet 1    citalopram (CeleXA) 20 mg tablet Take 1 tablet (20 mg) by mouth once every day. 90 tablet 1    CRANBERRY ORAL Take by mouth.      fenofibrate (Tricor) 145 mg tablet Take 1 tablet (145 mg) by mouth once daily. 90 tablet 3    ferrous gluconate 324 (38 Fe) mg tablet Take 1 tablet (324 mg) by mouth 2 times a day. 180 tablet 2    lutein 6 mg capsule Take by mouth.      metoprolol tartrate (Lopressor) 100 mg tablet Take 1 tablet (100 mg) by mouth once every day. 90 tablet 1    olmesartan (BENIcar) 40 mg tablet Take 1 tablet (40 mg) by mouth once daily. 90 tablet 1    simvastatin (Zocor) 20 mg tablet Take 1 tablet (20 mg) by mouth once every day. 90 tablet 1    tirzepatide (Mounjaro) 5 mg/0.5 mL pen injector Inject 5 mg under the skin 1 (one) time per week. 2 mL 3     No current facility-administered medications on file prior to visit.           Assessment and Plan        Ms. Ochoa is a 78-year-old female with past medical history of type 2 diabetes well-controlled-hypertension well-controlled, nephrolithiasis and chronic kidney disease was  coming to see me today for follow-up     Impression  #Chronic kidney disease stage IIIb-4/A1-progressive possibly atherosclerotic cardiovascular disease  -Baseline serum creatinine was normal up until March 2022. Recently for the last year serum creatinine has been ranging 1.5-2 with GFR 25-35 mill per minute per 1.73 mÂ². Most recent serum creatinine 1.6, GFR 32 mill per minute per 1.73 mÂ² in February 2024-slightly improving from prior. She reports an event of kidney stone around March 2022. CAT scan abdomen pelvis done in March 2022 was reviewed and showed left mild hydronephrosis and hydroureter with 3 mm kidney stone seems to be passing to the bladder. Repeat kidney ultrasound done in February 2023 showed normal-sized kidneys bilateral right kidney 9.5, left kidney 10.6 with 1.3 cm cyst. No evidence for nephrolithiasis or hydronephrosis on both sides.  -Within normal electrolytes with no significant acidosis  -Spot test albumin creatinine issues negative  -Follow conservative measures. Continue RAAS inhibitors and GLP-1 RA (Mounjaro)     #Hypertension-well-controlled. Current medication amlodipine 10 mg,  metoprolol 100 mg, olmesartan 40 mg.  Furosemide 20 mg is not currently on her list.  No indication to restart     #Type 2 diabetes-well-controlled. On GLP 1 RA. No albuminuria     #Nephrolithiasis-repeat kidney image/kidney ultrasound in February 2023 negative for nephrolithiasis or hydroureter/hydronephrosis. No recent nephrolithiasis activity. Consider metabolic work-up if needed     #Urine urgency and frequency-possible pelvic muscle weakness and overactive bladder. Refilled oxybutynin at bedtime     #[id number] normal PTH, vitamin D, phosphorus and calcium    #CVS-low risk  -On statins, RAAS inhibitors and Mounjaro           Follow-up in 6 months with repeat blood work and urinalysis prior to next visit    Maribel Upton MD, MS, SOO WARREN   Clinical  - [facility]  [employer]   Nephrologist - [facility]

## 2024-02-26 NOTE — PATIENT INSTRUCTIONS
Dear HANSA   It was nice seeing you in the nephrology clinic today     Today we discussed the following:     # Chronic kidney disease stage III-possibly related to atherosclerosis. Kidney function is slightly improving now up to 32% from 23% last office visit. We will continue to monitor carefully     #Urine urgency and frequency-continue oxybutynin at bedtime     #Hypertension-blood pressures in good control-continue same medications.  Currently are not on water pill    # Iron deficiency anemia-continue oral iron.  Try to take it with vitamin C as possible to increase absorption     Follow-up in 6 months with another repeat blood work and urinalysis prior to next visit     Please call our office if you have any question  Thank you for coming to see me today     Maribel Upton MD, MS, SOO WARREN  Clinical  - [employer]  Nephrologist - [facility] - [facility]

## 2024-02-27 ENCOUNTER — OFFICE VISIT (OUTPATIENT)
Dept: DERMATOLOGY | Facility: CLINIC | Age: 79
End: 2024-02-27
Payer: MEDICARE

## 2024-02-27 VITALS — DIASTOLIC BLOOD PRESSURE: 65 MMHG | HEART RATE: 60 BPM | SYSTOLIC BLOOD PRESSURE: 148 MMHG

## 2024-02-27 DIAGNOSIS — C44.712 BASAL CELL CARCINOMA (BCC) OF SKIN OF RIGHT LOWER EXTREMITY INCLUDING HIP: ICD-10-CM

## 2024-02-27 PROCEDURE — 17313 MOHS 1 STAGE T/A/L: CPT | Performed by: DERMATOLOGY

## 2024-02-27 PROCEDURE — 1036F TOBACCO NON-USER: CPT | Performed by: DERMATOLOGY

## 2024-02-27 PROCEDURE — 99204 OFFICE O/P NEW MOD 45 MIN: CPT | Performed by: DERMATOLOGY

## 2024-02-27 PROCEDURE — 1159F MED LIST DOCD IN RCRD: CPT | Performed by: DERMATOLOGY

## 2024-02-27 PROCEDURE — 3077F SYST BP >= 140 MM HG: CPT | Performed by: DERMATOLOGY

## 2024-02-27 PROCEDURE — 3078F DIAST BP <80 MM HG: CPT | Performed by: DERMATOLOGY

## 2024-02-27 PROCEDURE — 1160F RVW MEDS BY RX/DR IN RCRD: CPT | Performed by: DERMATOLOGY

## 2024-02-27 NOTE — PROGRESS NOTES
Mohs Surgery Operative Note    Date of Surgery:  2/27/2024  Surgeon:  Mckayla Rosales MD  Office Location:  [address] [facility] [facility]  [address]  [address]  [facility] [address]  Dept: [phone]  Dept Fax: [phone]  Referring Provider: Deonna Santana MD  [address] [address] [facility]  [address]  [place] [address]      Assessment/Plan   Pre-procedure:   Obtained informed consent: written from patient  The surgical site was identified and confirmed with the patient.     Intra-operative:   Audible time out called at : 10:59 AM 02/27/24  by: RESHMA CHANEY RN   Verified patient name, birthdate, site, specimen bottle label & requisition.    The planned procedure(s) was again reviewed with the patient. The risks of bleeding, infection, nerve damage and scarring were reviewed. Written authorization was obtained. The patient identity, surgical site, and planned procedure(s) were verified. The provider acted as both surgeon and pathologist.     Basal cell carcinoma (BCC) of skin of right lower extremity including hip  Right medial ankle - Posterior  Mohs surgery  Consent obtained: written    Universal Protocol:  Procedure explained and questions answered to patient or proxy's satisfaction: Yes    Test results available and properly labeled: Yes    Pathology report reviewed: Yes    External notes reviewed: Yes    Photo or diagram used for site identification: Yes    Site/side marked: Yes    Slide independently reviewed by Mohs surgeon: Yes    Immediately prior to procedure a time out was called: Yes    Patient identity confirmed: verbally with patient  Preparation: Patient was prepped and draped in usual sterile fashion      Anticoagulation:  Is the patient taking prescription anticoagulant and/or aspirin prescribed/recommended by a physician? No    Was the anticoagulation regimen changed prior to Mohs? No      Anesthesia:  Anesthesia method: local infiltration  Local anesthetic: lidocaine 0.5%  WITH epi    Procedure Details:  Biopsy accession number: [id number]  Date of biopsy: 1/29/2024  Pre-Op diagnosis: basal cell carcinoma  BCC subtype: nodular  Surgery side: right  Surgical site (from skin exam): Right medial ankle - Posterior  Pre-operative length (cm): 1.9  Pre-operative width (cm): 1  Indications for Mohs surgery: ill-defined borders  Previously treated? No      Micrographic Surgery Details:  Post-operative length (cm): 1.5  Post-operative width (cm): 1.3  Number of Mohs stages: 1    Stage 1     Comments: The patient was brought into the operating room and placed in the procedure chair in the appropriate position.  The area positive by previous biopsy was identified and confirmed with the patient. The area of clinically obvious tumor was debulked using a curette and/or scalpel as needed. An incision was made following the Mohs approach through the skin. The specimen was taken to the lab, divided into 2 piece(s) and appropriately chromacoded and processed.     Tumor features identified on Mohs section: no tumor identified  Depth of defect: subcutaneous fat    Patient tolerance of procedure: tolerated well, no immediate complications    Reconstruction:  Was the defect reconstructed?: No    Hemostasis achieved with: electrodesiccation  Outcome: patient tolerated procedure well with no complications    Post-procedure details: sterile dressing applied and wound care instructions given    Dressing type: Gelfoam, Hypafix, pressure dressing and Telfa pad    Repair: After a discussion with the patient regarding the options for wound closure, a decision was made to proceed with second intention healing.  Dressing F/U: Surgifoam was placed in the wound. A pressure dressing was placed to help stabilize the wound and to minimize the risk of postoperative bleeding. Wound care was discussed, and the patient was given written post-operative wound care instructions.     The final repair measured 1.5x1.3  cm          Wound care was discussed, and the patient was given written post-operative wound care instructions.      The patient will follow up with Mckayla Rosales MD as needed for any post operative problems or concerns, and will follow up with their primary dermatologist as scheduled.

## 2024-02-27 NOTE — LETTER
March 15, 2024     Deonna Santana MD  [address] [address]    Patient: Francheska Ochoa   YOB: 1945   Date of Visit: 2/27/2024       Dear Dr. Deonna Santana MD:    Thank you for referring Francheska Ochoa to me for evaluation. Below are my notes for this consultation.  If you have questions, please do not hesitate to call me. I look forward to following your patient along with you.       Sincerely,     Mckayla Rosales MD      CC: No Recipients  ______________________________________________________________________________________    Mohs Surgery Operative Note    Date of Surgery:  2/27/2024  Surgeon:  Mckayla Rosales MD  Office Location: [address]  [facility] [address]  Dept: [phone]  Dept Fax: [phone]  Referring Provider: Deonna Santana MD  [address] [place] [address]      Assessment/Plan  Pre-procedure:   Obtained informed consent: written from patient  The surgical site was identified and confirmed with the patient.     Intra-operative:   Audible time out called at : 10:59 AM 02/27/24  by: RESHMA CHANEY RN   Verified patient name, birthdate, site, specimen bottle label & requisition.    The planned procedure(s) was again reviewed with the patient. The risks of bleeding, infection, nerve damage and scarring were reviewed. Written authorization was obtained. The patient identity, surgical site, and planned procedure(s) were verified. The provider acted as both surgeon and pathologist.     Basal cell carcinoma (BCC) of skin of right lower extremity including hip  Right medial ankle - Posterior  Mohs surgery  Consent obtained: written    Universal Protocol:  Procedure explained and questions answered to patient or proxy's satisfaction: Yes    Test results available and properly labeled: Yes    Pathology report reviewed: Yes    External notes reviewed: Yes    Photo or  diagram used for site identification: Yes    Site/side marked: Yes    Slide independently reviewed by Mohs surgeon: Yes    Immediately prior to procedure a time out was called: Yes    Patient identity confirmed: verbally with patient  Preparation: Patient was prepped and draped in usual sterile fashion      Anticoagulation:  Is the patient taking prescription anticoagulant and/or aspirin prescribed/recommended by a physician? No    Was the anticoagulation regimen changed prior to Mohs? No      Anesthesia:  Anesthesia method: local infiltration  Local anesthetic: lidocaine 0.5% WITH epi    Procedure Details:  Biopsy accession number: [id number]  Date of biopsy: 1/29/2024  Pre-Op diagnosis: basal cell carcinoma  BCC subtype: nodular  Surgery side: right  Surgical site (from skin exam): Right medial ankle - Posterior  Pre-operative length (cm): 1.9  Pre-operative width (cm): 1  Indications for Mohs surgery: ill-defined borders  Previously treated? No      Micrographic Surgery Details:  Post-operative length (cm): 1.5  Post-operative width (cm): 1.3  Number of Mohs stages: 1    Stage 1     Comments: The patient was brought into the operating room and placed in the procedure chair in the appropriate position.  The area positive by previous biopsy was identified and confirmed with the patient. The area of clinically obvious tumor was debulked using a curette and/or scalpel as needed. An incision was made following the Mohs approach through the skin. The specimen was taken to the lab, divided into 2 piece(s) and appropriately chromacoded and processed.     Tumor features identified on Mohs section: no tumor identified  Depth of defect: subcutaneous fat    Patient tolerance of procedure: tolerated well, no immediate complications    Reconstruction:  Was the defect reconstructed?: No    Hemostasis achieved with: electrodesiccation  Outcome: patient tolerated procedure well with no complications    Post-procedure details:  sterile dressing applied and wound care instructions given    Dressing type: Gelfoam, Hypafix, pressure dressing and Telfa pad    Repair: After a discussion with the patient regarding the options for wound closure, a decision was made to proceed with second intention healing.  Dressing F/U: Surgifoam was placed in the wound. A pressure dressing was placed to help stabilize the wound and to minimize the risk of postoperative bleeding. Wound care was discussed, and the patient was given written post-operative wound care instructions.     The final repair measured 1.5x1.3 cm          Wound care was discussed, and the patient was given written post-operative wound care instructions.      The patient will follow up with Mckayla Rosales MD as needed for any post operative problems or concerns, and will follow up with their primary dermatologist as scheduled.        Office Visit Note  Date: 2/27/2024  Surgeon:  Mckayla Rosales MD  Office Location: [address]  [address] [facility] [address]  [facility] [address]  Dept: [phone]  Dept Fax: [phone]  Referring Provider: Deonna Santana MD  [address] [facility] [address]  [place] [address]    Subjective  Francheska Ochoa is a 78 y.o. female who presents for the following: MOHS Surgery (Right medial posterior ankle-BCC)    According to the patient, the lesion has been present for approximately 6 months at the time of diagnosis.  The lesion is not causing symptoms.  The lesion has not been treated previously.    The patient does not have a pacemaker / defibrillator.  The patient does not have a heart valve / joint replacement.    The patient is not on blood thinners.  The patient does not have a history of hepatitis B or C.  The patient does not have a history of HIV.  The patient does not have a history of immunosuppression (e.g. organ transplantation, malignancy, medications)    Review of Systems:  No other skin or systemic complaints  other than what is documented elsewhere in the note.    MEDICAL HISTORY: clinically relevant history including significant past medical history, medications and allergies was reviewed and documented in Epic.    Objective  Well appearing patient in no apparent distress; mood and affect are within normal limits.  Vital signs: See record.  Noted on the Right medial ankle - Posterior  Is a 1.9 x 1.0 cm scar    The patient confirmed the identified site.    Discussion:  The nature of the diagnosis was explained. The lesion is a skin cancer.  It has a risk of local growth and distant spread. The condition is associated with sun exposure.  Warning signs of non-melanoma skin cancer discussed. Patient was instructed to perform monthly self skin examination.  We recommended that the patient have regular full skin exams given an increased risk of subsequent skin cancers. The patient was instructed to use sun protective behaviors including use of broad spectrum sunscreens and sun protective clothing to reduce risk of skin cancers.      Risks, benefits, side effects of Mohs surgery were discussed with patient and the patient voiced understanding.  It was explained that even though the cure rate of Mohs is very high it is not 100%. Risks of surgery including but not limited to bleeding, infection, numbness, nerve damage, and scar were reviewed.  Discussion included wound care requirements, activity restrictions, likely scar outcome and time to heal.     After Mohs surgery, the defect may need to be repaired surgically and the scar may be longer than the original lesion.  Reconstruction options, risks, and benefits were reviewed including second intention healing, linear repair (4-1 ratio was explained), local flaps, skin grafts, cartilage grafts and interpolation flaps (the need for multiple surgeries was explained). Possible outcomes were reviewed including likely scar appearance, failure of flap survival, infection, bleeding and  the need for revision surgery.     The pathology was reviewed, the photograph was reviewed, and the referring physician's note was reviewed.    Patient elected for Mohs surgery.

## 2024-02-27 NOTE — PROGRESS NOTES
Office Visit Note  Date: 2/27/2024  Surgeon:  Mckayla Rosales MD  Office Location:  [address] [facility]  [address] [facility]  [address]  [facility] [address]  Dept: [phone]  Dept Fax: [phone]  Referring Provider: Deonna Santana MD  [id number] [facility]  [address]  [place] [address]    Subjective   Francheska Ochoa is a 78 y.o. female who presents for the following: MOHS Surgery (Right medial posterior ankle-BCC)    According to the patient, the lesion has been present for approximately 6 months at the time of diagnosis.  The lesion is not causing symptoms.  The lesion has not been treated previously.    The patient does not have a pacemaker / defibrillator.  The patient does not have a heart valve / joint replacement.    The patient is not on blood thinners.  The patient does not have a history of hepatitis B or C.  The patient does not have a history of HIV.  The patient does not have a history of immunosuppression (e.g. organ transplantation, malignancy, medications)    Review of Systems:  No other skin or systemic complaints other than what is documented elsewhere in the note.    MEDICAL HISTORY: clinically relevant history including significant past medical history, medications and allergies was reviewed and documented in Epic.    Objective   Well appearing patient in no apparent distress; mood and affect are within normal limits.  Vital signs: See record.  Noted on the Right medial ankle - Posterior  Is a 1.9 x 1.0 cm scar    The patient confirmed the identified site.    Discussion:  The nature of the diagnosis was explained. The lesion is a skin cancer.  It has a risk of local growth and distant spread. The condition is associated with sun exposure.  Warning signs of non-melanoma skin cancer discussed. Patient was instructed to perform monthly self skin examination.  We recommended that the patient have regular full skin exams given an increased risk of subsequent skin cancers. The  patient was instructed to use sun protective behaviors including use of broad spectrum sunscreens and sun protective clothing to reduce risk of skin cancers.      Risks, benefits, side effects of Mohs surgery were discussed with patient and the patient voiced understanding.  It was explained that even though the cure rate of Mohs is very high it is not 100%. Risks of surgery including but not limited to bleeding, infection, numbness, nerve damage, and scar were reviewed.  Discussion included wound care requirements, activity restrictions, likely scar outcome and time to heal.     After Mohs surgery, the defect may need to be repaired surgically and the scar may be longer than the original lesion.  Reconstruction options, risks, and benefits were reviewed including second intention healing, linear repair (4-1 ratio was explained), local flaps, skin grafts, cartilage grafts and interpolation flaps (the need for multiple surgeries was explained). Possible outcomes were reviewed including likely scar appearance, failure of flap survival, infection, bleeding and the need for revision surgery.     The pathology was reviewed, the photograph was reviewed, and the referring physician's note was reviewed.    Patient elected for Mohs surgery.

## 2024-04-09 ENCOUNTER — OFFICE VISIT (OUTPATIENT)
Dept: PRIMARY CARE | Facility: CLINIC | Age: 79
End: 2024-04-09
Payer: MEDICARE

## 2024-04-09 VITALS
HEIGHT: 64 IN | WEIGHT: 174 LBS | SYSTOLIC BLOOD PRESSURE: 120 MMHG | DIASTOLIC BLOOD PRESSURE: 70 MMHG | BODY MASS INDEX: 29.71 KG/M2

## 2024-04-09 DIAGNOSIS — E66.09 OBESITY DUE TO EXCESS CALORIES, UNSPECIFIED CLASSIFICATION, UNSPECIFIED WHETHER SERIOUS COMORBIDITY PRESENT: ICD-10-CM

## 2024-04-09 DIAGNOSIS — E78.5 HYPERLIPIDEMIA, UNSPECIFIED HYPERLIPIDEMIA TYPE: ICD-10-CM

## 2024-04-09 DIAGNOSIS — I10 ESSENTIAL HYPERTENSION: ICD-10-CM

## 2024-04-09 DIAGNOSIS — D64.9 ANEMIA, UNSPECIFIED TYPE: ICD-10-CM

## 2024-04-09 DIAGNOSIS — E78.2 DM TYPE 2 WITH DIABETIC MIXED HYPERLIPIDEMIA (MULTI): ICD-10-CM

## 2024-04-09 DIAGNOSIS — E11.69 DM TYPE 2 WITH DIABETIC MIXED HYPERLIPIDEMIA (MULTI): ICD-10-CM

## 2024-04-09 DIAGNOSIS — M25.562 ACUTE PAIN OF LEFT KNEE: Primary | ICD-10-CM

## 2024-04-09 DIAGNOSIS — N32.81 OVERACTIVE BLADDER: ICD-10-CM

## 2024-04-09 LAB — POC HEMOGLOBIN A1C: 5.6 % (ref 4.2–6.5)

## 2024-04-09 PROCEDURE — 3078F DIAST BP <80 MM HG: CPT | Performed by: INTERNAL MEDICINE

## 2024-04-09 PROCEDURE — 1036F TOBACCO NON-USER: CPT | Performed by: INTERNAL MEDICINE

## 2024-04-09 PROCEDURE — 3074F SYST BP LT 130 MM HG: CPT | Performed by: INTERNAL MEDICINE

## 2024-04-09 PROCEDURE — 99214 OFFICE O/P EST MOD 30 MIN: CPT | Performed by: INTERNAL MEDICINE

## 2024-04-09 PROCEDURE — 1159F MED LIST DOCD IN RCRD: CPT | Performed by: INTERNAL MEDICINE

## 2024-04-09 PROCEDURE — 1160F RVW MEDS BY RX/DR IN RCRD: CPT | Performed by: INTERNAL MEDICINE

## 2024-04-09 PROCEDURE — 83036 HEMOGLOBIN GLYCOSYLATED A1C: CPT | Performed by: INTERNAL MEDICINE

## 2024-04-09 RX ORDER — CITALOPRAM 20 MG/1
20 TABLET, FILM COATED ORAL
Qty: 90 TABLET | Refills: 1 | Status: SHIPPED | OUTPATIENT
Start: 2024-04-09

## 2024-04-09 RX ORDER — METOPROLOL TARTRATE 50 MG/1
50 TABLET ORAL 2 TIMES DAILY
Qty: 180 TABLET | Refills: 1 | Status: SHIPPED | OUTPATIENT
Start: 2024-04-09 | End: 2024-10-06

## 2024-04-09 RX ORDER — OXYBUTYNIN CHLORIDE 10 MG/1
10 TABLET, EXTENDED RELEASE ORAL EVERY EVENING
Qty: 90 TABLET | Refills: 1 | Status: SHIPPED | OUTPATIENT
Start: 2024-04-09

## 2024-04-09 RX ORDER — BLOOD SUGAR DIAGNOSTIC
1 STRIP MISCELLANEOUS 2 TIMES DAILY
Qty: 100 STRIP | Refills: 2 | Status: SHIPPED | OUTPATIENT
Start: 2024-04-09

## 2024-04-09 RX ORDER — AMLODIPINE BESYLATE 10 MG/1
10 TABLET ORAL DAILY
Qty: 90 TABLET | Refills: 3 | Status: SHIPPED | OUTPATIENT
Start: 2024-04-09

## 2024-04-09 RX ORDER — SIMVASTATIN 20 MG/1
20 TABLET, FILM COATED ORAL
Qty: 90 TABLET | Refills: 1 | Status: SHIPPED | OUTPATIENT
Start: 2024-04-09

## 2024-04-09 RX ORDER — OLMESARTAN MEDOXOMIL 40 MG/1
40 TABLET ORAL DAILY
Qty: 90 TABLET | Refills: 1 | Status: SHIPPED | OUTPATIENT
Start: 2024-04-09

## 2024-04-09 RX ORDER — DICLOFENAC SODIUM 10 MG/G
4 GEL TOPICAL 4 TIMES DAILY
Qty: 100 G | Refills: 1 | Status: SHIPPED | OUTPATIENT
Start: 2024-04-09 | End: 2024-04-29 | Stop reason: SDUPTHER

## 2024-04-09 RX ORDER — TIRZEPATIDE 5 MG/.5ML
5 INJECTION, SOLUTION SUBCUTANEOUS
Qty: 2 ML | Refills: 3 | Status: SHIPPED | OUTPATIENT
Start: 2024-04-09 | End: 2024-04-29 | Stop reason: SDUPTHER

## 2024-04-09 RX ORDER — OXYBUTYNIN CHLORIDE 10 MG/1
10 TABLET, EXTENDED RELEASE ORAL EVERY EVENING
COMMUNITY
End: 2024-04-09 | Stop reason: SDUPTHER

## 2024-04-09 RX ORDER — OXYBUTYNIN CHLORIDE 5 MG/1
5 TABLET ORAL EVERY EVENING
COMMUNITY
End: 2024-04-09 | Stop reason: WASHOUT

## 2024-04-09 RX ORDER — FERROUS GLUCONATE 324(38)MG
1 TABLET ORAL 2 TIMES DAILY
Qty: 180 TABLET | Refills: 2 | Status: SHIPPED | OUTPATIENT
Start: 2024-04-09 | End: 2025-04-09

## 2024-04-09 RX ORDER — FENOFIBRATE 145 MG/1
145 TABLET, FILM COATED ORAL DAILY
Qty: 90 TABLET | Refills: 3 | Status: SHIPPED | OUTPATIENT
Start: 2024-04-09

## 2024-04-09 RX ORDER — CHOLECALCIFEROL (VITAMIN D3) 50 MCG
50 TABLET ORAL DAILY
Qty: 90 TABLET | Refills: 1 | Status: SHIPPED | OUTPATIENT
Start: 2024-04-09

## 2024-04-09 RX ORDER — TITANIUM DIOXIDE, OCTINOXATE, ZINC OXIDE 4.61; 1.6; .78 G/40ML; G/40ML; G/40ML
1 CREAM TOPICAL DAILY
COMMUNITY

## 2024-04-09 ASSESSMENT — LIFESTYLE VARIABLES: HOW MANY STANDARD DRINKS CONTAINING ALCOHOL DO YOU HAVE ON A TYPICAL DAY: PATIENT DOES NOT DRINK

## 2024-04-09 NOTE — PROGRESS NOTES
I reviewed the resident/fellow's documentation and discussed the patient with the resident/fellow. I agree with the resident/fellow's medical decision making as documented in the note.  As the attending physician, I certify that I personally reviewed the patient's history and personally examined the patient to confirm the physical findings described above, and that I reviewed the relevant imaging studies and available reports. I also discussed the differential diagnosis and all of the proposed management plans with the patient and individuals accompanying the patient to this visit. They had the opportunity to ask questions about the proposed management plans and to have those questions answered.     Shruti Murphy MD

## 2024-04-09 NOTE — PROGRESS NOTES
MRN: [id number]     Subjective   Patient ID: Francheska Ochoa is a 78 y.o. female who presents for Follow-up.  HPI  Mrs. Francheska Ochoa is an 79 yo female patient with PMHx of HTN, HLD, DM II and CKD who presented to the office for follow up visit. She is doing well, requesting refills. She had a recent Mohs surgery for BCC on left leg. She ran out of Mounjaro for couple months, but now she is back on it. Her weight has been stable since last visit. Her BP is stable. BG good at home between 83 and 100. She denied any new episodes of tachycardia, chest pain. She is doing PT for balance and walking more now, but her left knee started to hurt.     Review of Systems  - CONSTITUTIONAL: Denies weight loss, fever and chills.  - HEENT: Denies changes in vision and hearing.  - RESPIRATORY: Denies SOB and cough.  - CV: Denies palpitations and CP.   - GI: Denies abdominal pain, nausea, vomiting and diarrhea.  - : Denies dysuria and urinary frequency.  - MSK: Denies myalgia and  joint pain.  - SKIN: Denies rash and pruritus.  - NEUROLOGICAL: Denies headache and syncope.  - PSYCHIATRIC: Denies recent changes in mood. Denies anxiety and depression.    Objective   Physical Exam  Constitutional: patient is alert and cooperative with exam  skin: dry and warm  Eyes: EOMI and clear sclera  ENMT: moist mucous membranes  Head/Neck: normal neck, no JVD and trachea midline  Respiratory/Thorax: Clear to auscultation bilaterally, no wheezing or crackles appreciated  Cardiovascular: regular rate and rhythm, S1 and S2 present, no murmurs heard  Gastrointestinal: bowel sounds present, no pain or tenderness upon palpation, soft and nondistended  Extremities: +2 radial, posterior tibial, and pedal pulse, no lower extremity edema noted  Neurological: A&Ox3 no focal deficit   Visit Vitals  /70 (BP Location: Left arm, Patient Position: Sitting, BP Cuff Size: Adult)          Assessment/Plan     Mrs. Francheska Ochoa is an 79 yo female patient  with PMHx of HTN, HLD, DM II and CKD who presented to the office for follow up visit, requesting refills.    #Tachycardia, HTN  - stress echo was normal  - Holter EKG showed runs of SVT in the morning but patient was not symptomatic at that time   - BP in office today 120/70  - continue amlodipine 10 mg daily, Olmesartan 40 mg daily   - will change metoprolol dose to 50 mg BID seen runs of SVT in the morning and candida in the afternoon    #Basal cell carcinoma   - Had Mohs surgery on skin RLE including the hip  - follows with dermatology     #DM II:  - HbA1c 5.6 in office today   - BG under control  - continue Tirzepatide 5 mg weekly refill sent    #HLD  - Continue Simvastatin 20 mg daily    #CKD  - Kidney function stable last egFR 32 in 2/2024  - follows with nephrology  - on ARBs, GLP1 and iron supplementation     #Depression  - under control  - continue citalopram 20 mg daily    #Health maintenance   - Mammogram 1/26/2024 Normal  - Colonoscopy: 10/5/2019 showed diverticulosis repeat in 5 years 10/2024  - Dexa: 12/2021 T score -0.2 > repeated 1/2024 showed osteopenia T -1.7 in forearm   - immunization uptodate   - Lab: lipid panel at next visit     Dispo: F/U in  3-4 months

## 2024-04-15 ENCOUNTER — TELEPHONE (OUTPATIENT)
Dept: PRIMARY CARE | Facility: CLINIC | Age: 79
End: 2024-04-15
Payer: MEDICARE

## 2024-04-17 DIAGNOSIS — M25.552 PAIN OF LEFT HIP: Primary | ICD-10-CM

## 2024-04-17 NOTE — TELEPHONE ENCOUNTER
Sorry there was some confusion the other day.   Patient asked for knee x-rays but she is wanting a hip x-ray as well as the knee x-rays.   She says when she walks it feels like her hip is giving out.   Can we order hip as well as the knee we previusly ordered?

## 2024-04-29 DIAGNOSIS — M25.562 ACUTE PAIN OF LEFT KNEE: ICD-10-CM

## 2024-04-29 DIAGNOSIS — E11.69 DM TYPE 2 WITH DIABETIC MIXED HYPERLIPIDEMIA (MULTI): ICD-10-CM

## 2024-04-29 DIAGNOSIS — E78.2 DM TYPE 2 WITH DIABETIC MIXED HYPERLIPIDEMIA (MULTI): ICD-10-CM

## 2024-04-29 RX ORDER — DICLOFENAC SODIUM 10 MG/G
4 GEL TOPICAL 4 TIMES DAILY
Qty: 100 G | Refills: 1 | Status: SHIPPED | OUTPATIENT
Start: 2024-04-29 | End: 2024-05-17

## 2024-04-29 RX ORDER — TIRZEPATIDE 5 MG/.5ML
5 INJECTION, SOLUTION SUBCUTANEOUS
Qty: 6 ML | Refills: 1 | Status: SHIPPED | OUTPATIENT
Start: 2024-04-29

## 2024-05-09 ENCOUNTER — HOSPITAL ENCOUNTER (OUTPATIENT)
Dept: RADIOLOGY | Facility: HOSPITAL | Age: 79
Discharge: HOME | End: 2024-05-09
Payer: MEDICARE

## 2024-05-09 DIAGNOSIS — M25.562 ACUTE PAIN OF LEFT KNEE: ICD-10-CM

## 2024-05-09 DIAGNOSIS — M25.552 PAIN OF LEFT HIP: ICD-10-CM

## 2024-05-09 PROCEDURE — 73502 X-RAY EXAM HIP UNI 2-3 VIEWS: CPT | Mod: LEFT SIDE | Performed by: RADIOLOGY

## 2024-05-09 PROCEDURE — 73562 X-RAY EXAM OF KNEE 3: CPT | Mod: LT

## 2024-05-09 PROCEDURE — 73502 X-RAY EXAM HIP UNI 2-3 VIEWS: CPT | Mod: LT

## 2024-05-09 PROCEDURE — 73562 X-RAY EXAM OF KNEE 3: CPT | Mod: LEFT SIDE | Performed by: RADIOLOGY

## 2024-05-13 DIAGNOSIS — M16.10 HIP ARTHRITIS: Primary | ICD-10-CM

## 2024-05-17 DIAGNOSIS — M25.562 ACUTE PAIN OF LEFT KNEE: ICD-10-CM

## 2024-05-17 RX ORDER — DICLOFENAC SODIUM 10 MG/G
GEL TOPICAL
Qty: 200 G | Refills: 0 | Status: SHIPPED | OUTPATIENT
Start: 2024-05-17

## 2024-05-21 ENCOUNTER — TELEPHONE (OUTPATIENT)
Dept: PRIMARY CARE | Facility: CLINIC | Age: 79
End: 2024-05-21
Payer: MEDICARE

## 2024-05-23 ENCOUNTER — HOSPITAL ENCOUNTER (OUTPATIENT)
Dept: RADIOLOGY | Facility: CLINIC | Age: 79
Discharge: HOME | End: 2024-05-23
Payer: MEDICARE

## 2024-05-23 ENCOUNTER — OFFICE VISIT (OUTPATIENT)
Dept: ORTHOPEDIC SURGERY | Facility: CLINIC | Age: 79
End: 2024-05-23
Payer: MEDICARE

## 2024-05-23 VITALS — WEIGHT: 179 LBS | BODY MASS INDEX: 31.71 KG/M2 | HEIGHT: 63 IN

## 2024-05-23 DIAGNOSIS — M16.10 HIP ARTHRITIS: ICD-10-CM

## 2024-05-23 DIAGNOSIS — M25.562 CHRONIC PAIN OF LEFT KNEE: ICD-10-CM

## 2024-05-23 DIAGNOSIS — M25.552 LEFT HIP PAIN: ICD-10-CM

## 2024-05-23 DIAGNOSIS — M25.552 LEFT HIP PAIN: Primary | ICD-10-CM

## 2024-05-23 DIAGNOSIS — G89.29 CHRONIC PAIN OF LEFT KNEE: ICD-10-CM

## 2024-05-23 PROCEDURE — 1160F RVW MEDS BY RX/DR IN RCRD: CPT | Performed by: STUDENT IN AN ORGANIZED HEALTH CARE EDUCATION/TRAINING PROGRAM

## 2024-05-23 PROCEDURE — 20610 DRAIN/INJ JOINT/BURSA W/O US: CPT | Performed by: STUDENT IN AN ORGANIZED HEALTH CARE EDUCATION/TRAINING PROGRAM

## 2024-05-23 PROCEDURE — 73502 X-RAY EXAM HIP UNI 2-3 VIEWS: CPT | Mod: LT

## 2024-05-23 PROCEDURE — 99205 OFFICE O/P NEW HI 60 MIN: CPT | Performed by: STUDENT IN AN ORGANIZED HEALTH CARE EDUCATION/TRAINING PROGRAM

## 2024-05-23 PROCEDURE — 73502 X-RAY EXAM HIP UNI 2-3 VIEWS: CPT | Mod: LEFT SIDE | Performed by: RADIOLOGY

## 2024-05-23 PROCEDURE — 73560 X-RAY EXAM OF KNEE 1 OR 2: CPT | Mod: LEFT SIDE | Performed by: RADIOLOGY

## 2024-05-23 PROCEDURE — 73560 X-RAY EXAM OF KNEE 1 OR 2: CPT | Mod: LT

## 2024-05-23 PROCEDURE — 1159F MED LIST DOCD IN RCRD: CPT | Performed by: STUDENT IN AN ORGANIZED HEALTH CARE EDUCATION/TRAINING PROGRAM

## 2024-05-23 RX ORDER — LIDOCAINE HYDROCHLORIDE 10 MG/ML
4 INJECTION INFILTRATION; PERINEURAL
Status: COMPLETED | OUTPATIENT
Start: 2024-05-23 | End: 2024-05-23

## 2024-05-23 RX ORDER — TRIAMCINOLONE ACETONIDE 40 MG/ML
40 INJECTION, SUSPENSION INTRA-ARTICULAR; INTRAMUSCULAR
Status: COMPLETED | OUTPATIENT
Start: 2024-05-23 | End: 2024-05-23

## 2024-05-23 RX ADMIN — TRIAMCINOLONE ACETONIDE 40 MG: 40 INJECTION, SUSPENSION INTRA-ARTICULAR; INTRAMUSCULAR at 09:37

## 2024-05-23 RX ADMIN — LIDOCAINE HYDROCHLORIDE 4 ML: 10 INJECTION INFILTRATION; PERINEURAL at 09:37

## 2024-05-23 ASSESSMENT — PAIN - FUNCTIONAL ASSESSMENT: PAIN_FUNCTIONAL_ASSESSMENT: 0-10

## 2024-05-23 ASSESSMENT — PAIN SCALES - GENERAL: PAINLEVEL_OUTOF10: 0 - NO PAIN

## 2024-05-23 NOTE — PROGRESS NOTES
NIKKI/TKA Related Summary           L hip: N  L knee: N  R hip: N  R knee: N  Lumbar surgery or significant pathology: N  200?: 6 level discectomy and fusion extending to ileum (Dr. Pruitt at [facility]). Doing well.            CC/SUBJECTIVE/HPI            PCP: Shruti Murphy MD  Referring provider: Shruti Murphy MD  Francheska Ochoa is a 78 y.o. female presenting for L hip and knee pain.  She has a PSH notable for significant lumbosacral surgery that is actually done quite well for about 20 years now.  More recently, she has begun experiencing pain in her left hip and knee.  At this point, the pain is limiting activities of daily living and hobbies. As detailed below, Francheska has been dealing with this pain for some time despite treatment.    L hip  Symptoms  Pain: 4/10  Onset: chronic/gradual  Duration: 1-2yrs  Location: front knee and outer knee  Quality: dull/ache and catch/lock  Limitations: morning stiffness, pain after activity, limp, feeling unstable, night pain, difficulty in/out of chair/car, and difficulty with socks/shoes/clothes  Ambulation limit due to pain: 100-500ft  Other symptoms: none  Treatment  Tried: activity modification, weight loss, PT, home exercises, topical gel (ie Voltaren), and OTCs  Most recent injection <3mo ago? na  Assistive device: none  Treatment attempted for 1-2yrs and is partially effective    L knee  Symptoms  Pain: 4/10  Onset: chronic/gradual  Duration: 1-2yrs  Location: groin and front thigh  Quality: dull/ache and catch/lock  Limitations: morning stiffness, pain after activity, limp, weakness, feeling unstable, difficulty with stairs, and difficulty in/out of chair/car  Ambulation limit due to pain: 100-500ft  Other symptoms: none  Treatment  Tried: activity modification, weight loss, PT, home exercises, topical gel (ie Voltaren), and OTCs  Most recent injection <3mo ago? na  Assistive device: none  Treatment attempted for 1-2yrs and is partially  effective          HISTORIES (System Generated and Pt Reported on Form Today)       Dental  Pt reported: No active issues     PMH  Pt reported: Denies heart/lung/kidney/liver issues, DM, stroke, seizure, bariatric surgery, anticoag, MRSA, cancer, personal/familial coagulopathies except: none in addition to below  System generated (PMH, problem list both included since EMR change caused discrepancies):   Past Medical History:   Diagnosis Date    Body mass index (BMI) 32.0-32.9, adult 05/17/2022    Body mass index (BMI) of 32.0 to 32.9 in adult    Body mass index (BMI) 33.0-33.9, adult 09/02/2022    BMI 33.0-33.9,adult    Body mass index (BMI) 34.0-34.9, adult 04/08/2022    Body mass index (BMI) of 34.0 to 34.9 in adult    Body mass index (BMI) 35.0-35.9, adult 11/12/2021    BMI 35.0-35.9,adult    Body mass index (BMI) 36.0-36.9, adult 08/13/2021    BMI 36.0-36.9,adult    Body mass index (BMI) 37.0-37.9, adult 04/15/2019    BMI 37.0-37.9, adult    Morbid (severe) obesity due to excess calories (Multi) 04/08/2022    Severe obesity (BMI 35.0-35.9 with comorbidity)    Personal history of other endocrine, nutritional and metabolic disease 02/11/2022    History of obesity     Patient Active Problem List   Diagnosis    Anemia    Arthritis    Bilateral leg edema    Chronic kidney disease    Cough    Depression, major, single episode, mild (CMS-HCC)    Essential hypertension    Fatigue    Hyperlipidemia    Lower back pain    Lung crackles    Nephrolithiasis    Obesity    Pneumonia    Right hip pain    Shortness of breath    Spinal stenosis    Unstable balance    Urinary tract infection    URTI (acute upper respiratory infection)    Vitamin D deficiency    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   System generated: No past surgical history on file.    Family  Hx  Pt reported clot/coagulopathies: none  System generated:   Family History   Problem Relation Name Age of Onset    Colon cancer Mother      Other (Diabetes Mellitus) Father      Other (Thyroid Disorder) Sister       Social Hx  Pt reported substance use: EtOH (1 drink(s)/wk)  System generated:   Social History     Tobacco Use    Smoking status: Never    Smokeless tobacco: Never   Substance Use Topics    Alcohol use: Never    Drug use: Never     Allergies  Pt reported (meds, metals): per below  System generated:   Allergies   Allergen Reactions    Codeine Other     UPSET STOMACH    Latex Rash     LATEX GLOVES     Current Meds  System generated:   Current Outpatient Medications:     amLODIPine (Norvasc) 10 mg tablet, Take 1 tablet (10 mg) by mouth once daily., Disp: 90 tablet, Rfl: 3    Autolet lancing device, 1 each 2 times a day. CHECK BLOOD SUGAR TWICE DAILY, Disp: 100 each, Rfl: 1    blood sugar diagnostic (Accu-Chek Guide test strips) strip, 1 strip 2 times a day. CHECK BLOOD SUGAR TWICE DAILY, Disp: 100 strip, Rfl: 2    cholecalciferol (Vitamin D-3) 50 MCG (2000 UT) tablet, Take 1 tablet (50 mcg) by mouth once daily., Disp: 90 tablet, Rfl: 1    citalopram (CeleXA) 20 mg tablet, Take 1 tablet (20 mg) by mouth once every day., Disp: 90 tablet, Rfl: 1    cranberry 400 mg capsule, Take 1 tablet by mouth once daily., Disp: , Rfl:     diclofenac sodium (Voltaren) 1 % gel, Apply 4.5 inches (4 g) topically 4 times a day., Disp: 100 g, Rfl: 1    fenofibrate (Tricor) 145 mg tablet, Take 1 tablet (145 mg) by mouth once daily., Disp: 90 tablet, Rfl: 3    ferrous gluconate 324 (38 Fe) mg tablet, Take 1 tablet (324 mg) by mouth 2 times a day., Disp: 180 tablet, Rfl: 2    lutein 6 mg capsule, Take by mouth., Disp: , Rfl:     metoprolol tartrate (Lopressor) 50 mg tablet, Take 1 tablet (50 mg) by mouth 2 times a day., Disp: 180 tablet, Rfl: 1    olmesartan (BENIcar) 40 mg tablet, Take 1 tablet (40 mg) by mouth once daily.,  "Disp: 90 tablet, Rfl: 1    oxybutynin XL (Ditropan-XL) 10 mg 24 hr tablet, Take 1 tablet (10 mg) by mouth once daily in the evening. Do not crush, chew, or split., Disp: 90 tablet, Rfl: 1    simvastatin (Zocor) 20 mg tablet, Take 1 tablet (20 mg) by mouth once every day., Disp: 90 tablet, Rfl: 1    tirzepatide (Mounjaro) 5 mg/0.5 mL pen injector, Inject 5 mg under the skin 1 (one) time per week., Disp: 6 mL, Rfl: 1    ROS: Neg except HPI            OBJECTIVE            Physical exam  Estimated body mass index is 29.87 kg/m² as calculated from the following:    Height as of 4/9/24: 1.626 m (5' 4\").    Weight as of 4/9/24: 78.9 kg (174 lb).  Gen/psych: NAD, conversational, appropriate    Ambulation  Gait: antalgic  Assistive device: none  Spine  Lumbar tenderness: neg  Limited ROM: pos, with no radiation or pain with flex/ex, lateral bending (known lumbosacral fusion)  SLR test: neg    Focused MSK exam: L  Hip  Trendelenberg test: neg  Skin/incision: normal in area of planned/possible incision (DA approach)   Tenderness: none  Pain with log roll: pos  Stinchfield: pos  ROM: within expected range but painful  Flexion: 100º  IR: 10º  ER: 40º  Abd: 30º  Knee  Thrust: neg  Skin/incision: normal in area of planned/possible incision (medial parapatellar approach)  Effusion: trace  Alignment: neutral  Alignment correctable: na  Knee tenderness: patellofemoral and lateral joint line  Pes or Gerdy's tenderness: neg  Crepitation: mild  Extension: passive flexion contracture 5-10° and active extension lag 0°  Flexion: 115°  Anterior drawer: stable  Posterior drawer: stable  Varus/Valgus in extension: stable  Varus/Valgus in flexion: stable  Referred pain to knee with hip 90° flexion and 45° ER/IR: neg  Neurovascular    Strength: 5/5 hip/knee/ankle flexion and extension  Sensory (L2-S1): SILT throughout lower extremity  Edema/stasis: no pitting edema  Pulse: DP 1+, PT 1+    Imaging  5/23/2024 L hip radiographs (AP Pelvis, " AP/Lateral) on my read: Joint space narrowing (severe) with osteophytes and sclerosis. Significant lumbar instrumentation including iliac screws.  5/9/2024 (WB AP/lat/Morin) and 5/24/24 (Merchant) L knee radiographs (Merchant, WB AP/lateral, WB AP flexion) on my read: Joint space narrowing (medial tibiofemoral minimal, lateral tibiofemoral severe on Morin, patellofemoral mild) with sclerosis. Limb alignment neutral.            ASSESSMENT & PLAN           Patient verbalized understanding of below A&P. All questions answered.  L hip, knee DJD  Differential includes radicular pain from spine. H&P most consistent with intra-articular pain from hip degeneration.  General information  Evaluation, imaging, diagnosis, and treatment options (conservative and surgical as well as risks, benefits, recovery, and outcomes) discussed. Conservative options should be maximized and include activity modification, bracing, weight loss, PT, home exercise, local pain control (ice, heat, topicals), OTCs, and assistive devices. Once exhausted, injections may provide relief. If these fail to improve pain, function, and quality of life, elective arthroplasty may be an option.  Shared decision making   Hip: Patient elected to be referred to a colleague who performs hip corticosteroid  Knee: Patient elected for a corticosteroid injection, which was provided.  PMH, PSH, other considerations discussed  Anemia (Hgb 11.9 on 2/14/2024)  CKD (Cr 1.6 on 2/14/24) associated with increased complications risk.  Dual mobility would be consideration given lumbar fusion  Listed in EMR but not endorsed by pt or evident on exam today: Diabetes (A1c 5.6 on 4/9/24), unstable balance  Occupation: High   Hobbies: Gardening, reading, needlepoint  Follow-up: As needed.  If she ultimately progresses to desiring surgery, she would be an acceptable arthroplasty candidate.    L Inj/Asp: L knee on 5/23/2024 9:37 AM  Indications: pain  Details:  22 G needle, anterolateral approach  Medications: 40 mg triamcinolone acetonide 40 mg/mL; 4 mL lidocaine 10 mg/mL (1 %)  Outcome: tolerated well, no immediate complications  Procedure, treatment alternatives, risks and benefits explained, specific risks discussed. Consent was given by the patient. Immediately prior to procedure a time out was called to verify the correct patient, procedure, equipment, support staff and site/side marked as required. Patient was prepped and draped in the usual sterile fashion.

## 2024-06-12 ENCOUNTER — HOSPITAL ENCOUNTER (OUTPATIENT)
Dept: RADIOLOGY | Facility: EXTERNAL LOCATION | Age: 79
Discharge: HOME | End: 2024-06-12

## 2024-06-12 ENCOUNTER — APPOINTMENT (OUTPATIENT)
Dept: ORTHOPEDIC SURGERY | Facility: CLINIC | Age: 79
End: 2024-06-12
Payer: MEDICARE

## 2024-06-12 DIAGNOSIS — M25.552 LEFT HIP PAIN: ICD-10-CM

## 2024-06-12 DIAGNOSIS — M16.12 LOCALIZED OSTEOARTHROSIS OF LEFT HIP: Primary | ICD-10-CM

## 2024-06-12 PROCEDURE — 1036F TOBACCO NON-USER: CPT | Performed by: FAMILY MEDICINE

## 2024-06-12 PROCEDURE — 1159F MED LIST DOCD IN RCRD: CPT | Performed by: FAMILY MEDICINE

## 2024-06-12 PROCEDURE — 20611 DRAIN/INJ JOINT/BURSA W/US: CPT | Performed by: FAMILY MEDICINE

## 2024-06-12 PROCEDURE — 99204 OFFICE O/P NEW MOD 45 MIN: CPT | Performed by: FAMILY MEDICINE

## 2024-06-12 RX ORDER — LIDOCAINE HYDROCHLORIDE 10 MG/ML
4 INJECTION INFILTRATION; PERINEURAL
Status: COMPLETED | OUTPATIENT
Start: 2024-06-12 | End: 2024-06-12

## 2024-06-12 RX ORDER — TRIAMCINOLONE ACETONIDE 40 MG/ML
80 INJECTION, SUSPENSION INTRA-ARTICULAR; INTRAMUSCULAR
Status: COMPLETED | OUTPATIENT
Start: 2024-06-12 | End: 2024-06-12

## 2024-06-12 NOTE — LETTER
June 12, 2024     Macario Roberson MD  [id number]  Department Of Orthopedics  [facility] [address]    Patient: Francheska Ochoa   YOB: 1945   Date of Visit: 6/12/2024       Dear Dr. Macario Roberson MD:    Thank you for referring Francheska Ochoa to me for evaluation. Below are my notes for this consultation.  If you have questions, please do not hesitate to call me. I look forward to following your patient along with you.       Sincerely,     Len Banda, DO      CC: No Recipients  ______________________________________________________________________________________    ** Please excuse any errors in grammar or translation related to this dictation. Voice recognition software was utilized to prepare this document. **    Assessment & Plan:  Patient referred for evaluation and management of left hip pain.  Clinical presentation along with imaging findings are consistent with advanced left hip arthritis. To this point, less invasive management strategies to include oral NSAIDs, PT have been ineffective in addressing the symptoms.  After reviewing patient's medical history as well as examining patient, feel that a steroid injection may be beneficial for management of the patient's arthritic condition. Patient was given option to have this injection completed today which they agreed to have done. See below. Discussed with patient that this injection can be repeated every 3 or more months as dictated by symptoms. If effectiveness of injection is minimal or condition is worsening, should notify Dr. Gonzalez to further discuss joint replacement surgery option.  Return precautions given. All questions answered and patient is agreeable this plan of care.    A copy of today's report will be electronically sent to Dr. Roberson for review.    Chief complaint:  Left knee pain    HPI:  77 y/o F, hx of DM and lumbar surgery, presents with left hip pain.  This complaint has been ongoing for 6 weeks.  Symptoms  started after doing PT.  Pain is most prominent at lateral hip radiating into groin.  To date, patient has tried a variety of treatments to include topical creams and otc nsaids with little sustained effect. Previously saw Dr. Roberson for this with last visit being 5/23/24.  Denies previous surgery to this site. Referred here to be evaluated for nonoperative management of left hip OA.    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  Current Outpatient Medications on File Prior to Visit   Medication Sig Dispense Refill   • amLODIPine (Norvasc) 10 mg tablet Take 1 tablet (10 mg) by mouth once daily. 90 tablet 3   • Autolet lancing device 1 each 2 times a day. CHECK BLOOD SUGAR TWICE DAILY 100 each 1   • blood sugar diagnostic (Accu-Chek Guide test strips) strip 1 strip 2 times a day. CHECK BLOOD SUGAR TWICE DAILY 100 strip 2   • cholecalciferol (Vitamin D-3) 50 MCG (2000 UT) tablet Take 1 tablet (50 mcg) by mouth once daily. 90 tablet 1   • citalopram (CeleXA) 20 mg tablet Take 1 tablet (20 mg) by mouth once every day. 90 tablet 1   • cranberry 400 mg capsule Take 1 tablet by mouth once daily.     • diclofenac sodium (Voltaren) 1 % gel APPLY 4.5 INCHES OF GEL (4 GRAMS) TOPICALLY FOUR TIMES A DAY. 200 g 0   • fenofibrate (Tricor) 145 mg tablet Take 1  tablet (145 mg) by mouth once daily. 90 tablet 3   • ferrous gluconate 324 (38 Fe) mg tablet Take 1 tablet (324 mg) by mouth 2 times a day. 180 tablet 2   • lutein 6 mg capsule Take by mouth.     • metoprolol tartrate (Lopressor) 50 mg tablet Take 1 tablet (50 mg) by mouth 2 times a day. 180 tablet 1   • olmesartan (BENIcar) 40 mg tablet Take 1 tablet (40 mg) by mouth once daily. 90 tablet 1   • oxybutynin XL (Ditropan-XL) 10 mg 24 hr tablet Take 1 tablet (10 mg) by mouth once daily in the evening. Do not crush, chew, or split. 90 tablet 1   • simvastatin (Zocor) 20 mg tablet Take 1 tablet (20 mg) by mouth once every day. 90 tablet 1   • tirzepatide (Mounjaro) 5 mg/0.5 mL pen injector Inject 5 mg under the skin 1 (one) time per week. 6 mL 1     No current facility-administered medications on file prior to visit.       Exam:  LEFT Hip Exam:  Antalgic gait  No warmth, erythema or ecchymosis overlying.  Active flexion >90 degrees with grossly abduction, adduction, ER. Limited IR.  NTTP over greater trochanter, glute tendons  [5]/5 strength of hip flexion, abduction, & adduction  SILT  [ - ]Log roll pain, [ + ]FADIR pain, [ - ]JESSICA pain, [ + ]Stinchfield    General Exam:  Constitutional - NAD, AAO x 3, conversing appropriately.  HEENT- Normocephalic and atraumatic. EOMI, PERRLA, No scleral icterus. No facial deformities. Hearing grossly normal.  Lungs - Breathing non-labored with normal rate. No accessory muscle use.  CV - Extremities warm and well-perfused, brisk capillary refill present.   Neuro - CN II-XII grossly intact.      Results:  X-rays left hip obtained 5/23/24 personally interpreted as advanced bilateral hip degenerative change. Surgical hardware visible in lumbar spine and across SI joint.     Reviewed referral note from Dr. Roberson dated 5/23/2024.  Lab Results   Component Value Date    HGBA1C 5.6 04/09/2024    CREATININE 1.60 (H) 02/14/2024    EGFR 32 (L) 02/14/2024      Procedure:  Patient ID: Francheska CULLEN  Don is a 78 y.o. female.    L Inj/Asp: L hip joint on 6/12/2024 3:09 PM  Indications: pain  Details: 22 G needle, ultrasound-guided anterior approach  Medications: 80 mg triamcinolone acetonide 40 mg/mL; 4 mL lidocaine 10 mg/mL (1 %)  Outcome: tolerated well, no immediate complications    Procedure risk factors to include increased pain, bleeding, infection, neurovascular injury, soft tissue injury, transient elevation of blood glucose and blood pressure, and adverse reaction to medication were discussed with the patient. Patient understands there is a moderate risk of morbidity from undergoing the procedure.    Procedure, treatment alternatives, risks and benefits explained, specific risks discussed. Consent was given by the patient. Immediately prior to procedure a time out was called to verify the correct patient, procedure, equipment, support staff and site/side marked as required. Patient was prepped and draped in the usual sterile fashion.

## 2024-06-12 NOTE — PROGRESS NOTES
** Please excuse any errors in grammar or translation related to this dictation. Voice recognition software was utilized to prepare this document. **    Assessment & Plan:  Patient referred for evaluation and management of left hip pain.  Clinical presentation along with imaging findings are consistent with advanced left hip arthritis. To this point, less invasive management strategies to include oral NSAIDs, PT have been ineffective in addressing the symptoms.  After reviewing patient's medical history as well as examining patient, feel that a steroid injection may be beneficial for management of the patient's arthritic condition. Patient was given option to have this injection completed today which they agreed to have done. See below. Discussed with patient that this injection can be repeated every 3 or more months as dictated by symptoms. If effectiveness of injection is minimal or condition is worsening, should notify Dr. Gonzalez to further discuss joint replacement surgery option.  Return precautions given. All questions answered and patient is agreeable this plan of care.    A copy of today's report will be electronically sent to Dr. oRberson for review.    Chief complaint:  Left knee pain    HPI:  77 y/o F, hx of DM and lumbar surgery, presents with left hip pain.  This complaint has been ongoing for 6 weeks.  Symptoms started after doing PT.  Pain is most prominent at lateral hip radiating into groin.  To date, patient has tried a variety of treatments to include topical creams and otc nsaids with little sustained effect. Previously saw Dr. Roberson for this with last visit being 5/23/24.  Denies previous surgery to this site. Referred here to be evaluated for nonoperative management of left hip OA.    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  Current Outpatient Medications on File Prior to Visit   Medication Sig Dispense Refill    amLODIPine (Norvasc) 10 mg tablet Take 1 tablet (10 mg) by mouth once daily. 90 tablet 3    Autolet lancing device 1 each 2 times a day. CHECK BLOOD SUGAR TWICE DAILY 100 each 1    blood sugar diagnostic (Accu-Chek Guide test strips) strip 1 strip 2 times a day. CHECK BLOOD SUGAR TWICE DAILY 100 strip 2    cholecalciferol (Vitamin D-3) 50 MCG (2000 UT) tablet Take 1 tablet (50 mcg) by mouth once daily. 90 tablet 1    citalopram (CeleXA) 20 mg tablet Take 1 tablet (20 mg) by mouth once every day. 90 tablet 1    cranberry 400 mg capsule Take 1 tablet by mouth once daily.      diclofenac sodium (Voltaren) 1 % gel APPLY 4.5 INCHES OF GEL (4 GRAMS) TOPICALLY FOUR TIMES A DAY. 200 g 0    fenofibrate (Tricor) 145 mg tablet Take 1 tablet (145 mg) by mouth once daily. 90 tablet 3    ferrous gluconate 324 (38 Fe) mg tablet Take 1 tablet (324 mg) by mouth 2 times a day. 180 tablet 2    lutein 6 mg capsule Take by mouth.      metoprolol tartrate (Lopressor) 50 mg tablet Take 1 tablet (50 mg) by mouth 2 times a day. 180 tablet 1    olmesartan (BENIcar) 40 mg tablet Take 1 tablet (40 mg) by mouth once daily. 90 tablet 1    oxybutynin XL (Ditropan-XL) 10 mg 24 hr tablet Take 1 tablet (10 mg) by mouth once daily in the evening. Do not crush, chew, or split. 90 tablet 1    simvastatin (Zocor) 20 mg tablet Take 1 tablet (20 mg) by mouth once every day. 90 tablet 1     tirzepatide (Mounjaro) 5 mg/0.5 mL pen injector Inject 5 mg under the skin 1 (one) time per week. 6 mL 1     No current facility-administered medications on file prior to visit.       Exam:  LEFT Hip Exam:  Antalgic gait  No warmth, erythema or ecchymosis overlying.  Active flexion >90 degrees with grossly abduction, adduction, ER. Limited IR.  NTTP over greater trochanter, glute tendons  [5]/5 strength of hip flexion, abduction, & adduction  SILT  [ - ]Log roll pain, [ + ]FADIR pain, [ - ]JESSICA pain, [ + ]Stinchfield    General Exam:  Constitutional - NAD, AAO x 3, conversing appropriately.  HEENT- Normocephalic and atraumatic. EOMI, PERRLA, No scleral icterus. No facial deformities. Hearing grossly normal.  Lungs - Breathing non-labored with normal rate. No accessory muscle use.  CV - Extremities warm and well-perfused, brisk capillary refill present.   Neuro - CN II-XII grossly intact.      Results:  X-rays left hip obtained 5/23/24 personally interpreted as advanced bilateral hip degenerative change. Surgical hardware visible in lumbar spine and across SI joint.     Reviewed referral note from Dr. Roberson dated 5/23/2024.  Lab Results   Component Value Date    HGBA1C 5.6 04/09/2024    CREATININE 1.60 (H) 02/14/2024    EGFR 32 (L) 02/14/2024      Procedure:  Patient ID: Francheska Ochoa is a 78 y.o. female.    L Inj/Asp: L hip joint on 6/12/2024 3:09 PM  Indications: pain  Details: 22 G needle, ultrasound-guided anterior approach  Medications: 80 mg triamcinolone acetonide 40 mg/mL; 4 mL lidocaine 10 mg/mL (1 %)  Outcome: tolerated well, no immediate complications    Procedure risk factors to include increased pain, bleeding, infection, neurovascular injury, soft tissue injury, transient elevation of blood glucose and blood pressure, and adverse reaction to medication were discussed with the patient. Patient understands there is a moderate risk of morbidity from undergoing the procedure.    Procedure, treatment  alternatives, risks and benefits explained, specific risks discussed. Consent was given by the patient. Immediately prior to procedure a time out was called to verify the correct patient, procedure, equipment, support staff and site/side marked as required. Patient was prepped and draped in the usual sterile fashion.

## 2024-07-30 ENCOUNTER — APPOINTMENT (OUTPATIENT)
Dept: PRIMARY CARE | Facility: CLINIC | Age: 79
End: 2024-07-30
Payer: MEDICARE

## 2024-08-12 DIAGNOSIS — E11.69 DM TYPE 2 WITH DIABETIC MIXED HYPERLIPIDEMIA (MULTI): ICD-10-CM

## 2024-08-12 DIAGNOSIS — E78.2 DM TYPE 2 WITH DIABETIC MIXED HYPERLIPIDEMIA (MULTI): ICD-10-CM

## 2024-08-12 RX ORDER — TIRZEPATIDE 5 MG/.5ML
INJECTION, SOLUTION SUBCUTANEOUS
Qty: 2 ML | Refills: 3 | Status: SHIPPED | OUTPATIENT
Start: 2024-08-12

## 2024-08-20 ENCOUNTER — APPOINTMENT (OUTPATIENT)
Dept: PRIMARY CARE | Facility: CLINIC | Age: 79
End: 2024-08-20
Payer: MEDICARE

## 2024-08-20 ENCOUNTER — LAB (OUTPATIENT)
Dept: LAB | Facility: LAB | Age: 79
End: 2024-08-20
Payer: MEDICARE

## 2024-08-20 VITALS — SYSTOLIC BLOOD PRESSURE: 110 MMHG | WEIGHT: 171 LBS | BODY MASS INDEX: 30.29 KG/M2 | DIASTOLIC BLOOD PRESSURE: 60 MMHG

## 2024-08-20 DIAGNOSIS — I10 ESSENTIAL HYPERTENSION: ICD-10-CM

## 2024-08-20 DIAGNOSIS — E78.5 HYPERLIPIDEMIA, UNSPECIFIED HYPERLIPIDEMIA TYPE: ICD-10-CM

## 2024-08-20 DIAGNOSIS — E78.2 DM TYPE 2 WITH DIABETIC MIXED HYPERLIPIDEMIA (MULTI): ICD-10-CM

## 2024-08-20 DIAGNOSIS — E11.69 DM TYPE 2 WITH DIABETIC MIXED HYPERLIPIDEMIA (MULTI): ICD-10-CM

## 2024-08-20 DIAGNOSIS — D63.8 ANEMIA IN OTHER CHRONIC DISEASES CLASSIFIED ELSEWHERE: ICD-10-CM

## 2024-08-20 DIAGNOSIS — D64.9 ANEMIA, UNSPECIFIED TYPE: ICD-10-CM

## 2024-08-20 DIAGNOSIS — E78.00 PURE HYPERCHOLESTEROLEMIA: ICD-10-CM

## 2024-08-20 DIAGNOSIS — E55.9 VITAMIN D DEFICIENCY: ICD-10-CM

## 2024-08-20 DIAGNOSIS — M25.512 ACUTE PAIN OF LEFT SHOULDER: Primary | ICD-10-CM

## 2024-08-20 DIAGNOSIS — Z12.11 SCREENING FOR COLON CANCER: ICD-10-CM

## 2024-08-20 DIAGNOSIS — N32.81 OVERACTIVE BLADDER: ICD-10-CM

## 2024-08-20 DIAGNOSIS — M25.562 ACUTE PAIN OF LEFT KNEE: ICD-10-CM

## 2024-08-20 DIAGNOSIS — E66.09 OBESITY DUE TO EXCESS CALORIES, UNSPECIFIED CLASSIFICATION, UNSPECIFIED WHETHER SERIOUS COMORBIDITY PRESENT: ICD-10-CM

## 2024-08-20 DIAGNOSIS — N18.32 STAGE 3B CHRONIC KIDNEY DISEASE (MULTI): ICD-10-CM

## 2024-08-20 LAB
ALBUMIN SERPL BCP-MCNC: 3.9 G/DL (ref 3.4–5)
ALP SERPL-CCNC: 40 U/L (ref 33–136)
ALT SERPL W P-5'-P-CCNC: 11 U/L (ref 7–45)
ANION GAP SERPL CALC-SCNC: 15 MMOL/L (ref 10–20)
AST SERPL W P-5'-P-CCNC: 15 U/L (ref 9–39)
BILIRUB SERPL-MCNC: 0.4 MG/DL (ref 0–1.2)
BUN SERPL-MCNC: 32 MG/DL (ref 6–23)
CALCIUM SERPL-MCNC: 9.6 MG/DL (ref 8.6–10.6)
CHLORIDE SERPL-SCNC: 100 MMOL/L (ref 98–107)
CHOLEST SERPL-MCNC: 136 MG/DL (ref 0–199)
CHOLESTEROL/HDL RATIO: 2.4
CO2 SERPL-SCNC: 28 MMOL/L (ref 21–32)
CREAT SERPL-MCNC: 1.36 MG/DL (ref 0.5–1.05)
CREAT UR-MCNC: 118 MG/DL (ref 20–320)
EGFRCR SERPLBLD CKD-EPI 2021: 39 ML/MIN/1.73M*2
ERYTHROCYTE [DISTWIDTH] IN BLOOD BY AUTOMATED COUNT: 12.3 % (ref 11.5–14.5)
EST. AVERAGE GLUCOSE BLD GHB EST-MCNC: 103 MG/DL
FERRITIN SERPL-MCNC: 324 NG/ML (ref 8–150)
GLUCOSE SERPL-MCNC: 101 MG/DL (ref 74–99)
HBA1C MFR BLD: 5.2 %
HCT VFR BLD AUTO: 37.8 % (ref 36–46)
HDLC SERPL-MCNC: 55.7 MG/DL
HGB BLD-MCNC: 12.3 G/DL (ref 12–16)
IRON SATN MFR SERPL: 21 % (ref 25–45)
IRON SERPL-MCNC: 72 UG/DL (ref 35–150)
LDLC SERPL CALC-MCNC: 63 MG/DL
MCH RBC QN AUTO: 32.9 PG (ref 26–34)
MCHC RBC AUTO-ENTMCNC: 32.5 G/DL (ref 32–36)
MCV RBC AUTO: 101 FL (ref 80–100)
MICROALBUMIN UR-MCNC: 13 MG/L
MICROALBUMIN/CREAT UR: 11 UG/MG CREAT
NON HDL CHOLESTEROL: 80 MG/DL (ref 0–149)
NRBC BLD-RTO: 0 /100 WBCS (ref 0–0)
PHOSPHATE SERPL-MCNC: 4.2 MG/DL (ref 2.5–4.9)
PLATELET # BLD AUTO: 221 X10*3/UL (ref 150–450)
POTASSIUM SERPL-SCNC: 4.5 MMOL/L (ref 3.5–5.3)
PROT SERPL-MCNC: 6.5 G/DL (ref 6.4–8.2)
PTH-INTACT SERPL-MCNC: 27.4 PG/ML (ref 18.5–88)
RBC # BLD AUTO: 3.74 X10*6/UL (ref 4–5.2)
SODIUM SERPL-SCNC: 138 MMOL/L (ref 136–145)
TIBC SERPL-MCNC: 351 UG/DL (ref 240–445)
TRIGL SERPL-MCNC: 88 MG/DL (ref 0–149)
TSH SERPL-ACNC: 1.35 MIU/L (ref 0.44–3.98)
UIBC SERPL-MCNC: 279 UG/DL (ref 110–370)
VLDL: 18 MG/DL (ref 0–40)
WBC # BLD AUTO: 6.8 X10*3/UL (ref 4.4–11.3)

## 2024-08-20 PROCEDURE — 99214 OFFICE O/P EST MOD 30 MIN: CPT | Performed by: INTERNAL MEDICINE

## 2024-08-20 PROCEDURE — 83036 HEMOGLOBIN GLYCOSYLATED A1C: CPT

## 2024-08-20 PROCEDURE — 36415 COLL VENOUS BLD VENIPUNCTURE: CPT

## 2024-08-20 PROCEDURE — 83550 IRON BINDING TEST: CPT

## 2024-08-20 PROCEDURE — 80053 COMPREHEN METABOLIC PANEL: CPT

## 2024-08-20 PROCEDURE — G2211 COMPLEX E/M VISIT ADD ON: HCPCS | Performed by: INTERNAL MEDICINE

## 2024-08-20 PROCEDURE — 82570 ASSAY OF URINE CREATININE: CPT

## 2024-08-20 PROCEDURE — 3078F DIAST BP <80 MM HG: CPT | Performed by: INTERNAL MEDICINE

## 2024-08-20 PROCEDURE — 84100 ASSAY OF PHOSPHORUS: CPT

## 2024-08-20 PROCEDURE — 84443 ASSAY THYROID STIM HORMONE: CPT

## 2024-08-20 PROCEDURE — 3074F SYST BP LT 130 MM HG: CPT | Performed by: INTERNAL MEDICINE

## 2024-08-20 PROCEDURE — 82043 UR ALBUMIN QUANTITATIVE: CPT

## 2024-08-20 PROCEDURE — 85027 COMPLETE CBC AUTOMATED: CPT

## 2024-08-20 PROCEDURE — 83970 ASSAY OF PARATHORMONE: CPT

## 2024-08-20 PROCEDURE — 82728 ASSAY OF FERRITIN: CPT

## 2024-08-20 PROCEDURE — 83540 ASSAY OF IRON: CPT

## 2024-08-20 PROCEDURE — 80061 LIPID PANEL: CPT

## 2024-08-20 PROCEDURE — 1124F ACP DISCUSS-NO DSCNMKR DOCD: CPT | Performed by: INTERNAL MEDICINE

## 2024-08-20 RX ORDER — FENOFIBRATE 145 MG/1
145 TABLET, FILM COATED ORAL DAILY
Qty: 90 TABLET | Refills: 3 | Status: SHIPPED | OUTPATIENT
Start: 2024-08-20

## 2024-08-20 RX ORDER — AMLODIPINE BESYLATE 10 MG/1
10 TABLET ORAL DAILY
Qty: 90 TABLET | Refills: 3 | Status: SHIPPED | OUTPATIENT
Start: 2024-08-20

## 2024-08-20 RX ORDER — OXYBUTYNIN CHLORIDE 10 MG/1
10 TABLET, EXTENDED RELEASE ORAL EVERY EVENING
Qty: 90 TABLET | Refills: 1 | Status: SHIPPED | OUTPATIENT
Start: 2024-08-20

## 2024-08-20 RX ORDER — METOPROLOL TARTRATE 50 MG/1
50 TABLET ORAL 2 TIMES DAILY
Qty: 180 TABLET | Refills: 1 | Status: SHIPPED | OUTPATIENT
Start: 2024-08-20 | End: 2025-02-16

## 2024-08-20 RX ORDER — OLMESARTAN MEDOXOMIL 40 MG/1
40 TABLET ORAL DAILY
Qty: 90 TABLET | Refills: 1 | Status: SHIPPED | OUTPATIENT
Start: 2024-08-20

## 2024-08-20 RX ORDER — FERROUS GLUCONATE 324(38)MG
1 TABLET ORAL 2 TIMES DAILY
Qty: 180 TABLET | Refills: 2 | Status: SHIPPED | OUTPATIENT
Start: 2024-08-20 | End: 2025-08-20

## 2024-08-20 RX ORDER — DICLOFENAC SODIUM 10 MG/G
2 GEL TOPICAL 2 TIMES DAILY PRN
Qty: 200 G | Refills: 0 | Status: SHIPPED | OUTPATIENT
Start: 2024-08-20

## 2024-08-20 RX ORDER — SIMVASTATIN 20 MG/1
20 TABLET, FILM COATED ORAL
Qty: 90 TABLET | Refills: 1 | Status: SHIPPED | OUTPATIENT
Start: 2024-08-20

## 2024-08-20 RX ORDER — CHOLECALCIFEROL (VITAMIN D3) 50 MCG
50 TABLET ORAL DAILY
Qty: 90 TABLET | Refills: 1 | Status: SHIPPED | OUTPATIENT
Start: 2024-08-20

## 2024-08-20 RX ORDER — CITALOPRAM 20 MG/1
20 TABLET, FILM COATED ORAL
Qty: 90 TABLET | Refills: 1 | Status: SHIPPED | OUTPATIENT
Start: 2024-08-20

## 2024-08-20 ASSESSMENT — ENCOUNTER SYMPTOMS
CONFUSION: 0
NAUSEA: 0
ACTIVITY CHANGE: 0
DIARRHEA: 0
COUGH: 0
FEVER: 0
AGITATION: 0
ABDOMINAL DISTENTION: 0
WHEEZING: 0
WEAKNESS: 0
VOMITING: 0
PALPITATIONS: 0
LIGHT-HEADEDNESS: 0
ABDOMINAL PAIN: 0
MYALGIAS: 1
SHORTNESS OF BREATH: 0
DIZZINESS: 0
FATIGUE: 0

## 2024-08-20 NOTE — ASSESSMENT & PLAN NOTE
- Pt hurt left shoulder while vacuuming   - Has been using Voltaren gel   - L shoulder XR ordered

## 2024-08-20 NOTE — PROGRESS NOTES
Subjective   Francheska Ochoa is a 78 y.o. female with PMH of HTN, HLD, T2DM, hx basal cell carcinoma, depression, and CKD who presents for No chief complaint on file.    Patient states that she has been doing ok overall. She has been having some left hip and knee pain, recently received steroid injections which helped significantly. She recently moved from a house to a condo. She did something to her left should while vacuuming the carpet about 10 days. Still experiencing pain with movement and extending the arm. She has been using voltaren gel at home. Blood sugars are usually under 100 when she checks. BP at home with systolic in the 120s. Patient follows with Dermatology, has had recent biopsies for history of basal cell carcinoma.       Review of Systems   Constitutional:  Negative for activity change, fatigue and fever.   Respiratory:  Negative for cough, shortness of breath and wheezing.    Cardiovascular:  Negative for chest pain, palpitations and leg swelling.   Gastrointestinal:  Negative for abdominal distention, abdominal pain, diarrhea, nausea and vomiting.   Musculoskeletal:  Positive for myalgias.        L shoulder pain    Neurological:  Negative for dizziness, weakness and light-headedness.   Psychiatric/Behavioral:  Negative for agitation and confusion.        Objective   Physical Exam  Constitutional:       General: She is not in acute distress.     Appearance: Normal appearance.   Cardiovascular:      Rate and Rhythm: Normal rate and regular rhythm.      Heart sounds: Normal heart sounds. No murmur heard.     No friction rub. No gallop.   Pulmonary:      Effort: Pulmonary effort is normal. No respiratory distress.      Breath sounds: Normal breath sounds. No wheezing, rhonchi or rales.   Abdominal:      General: Abdomen is flat. Bowel sounds are normal. There is no distension.      Palpations: Abdomen is soft.      Tenderness: There is no abdominal tenderness.   Musculoskeletal:         General:  No swelling or tenderness. Normal range of motion.   Skin:     General: Skin is warm and dry.      Findings: No erythema or rash.   Neurological:      General: No focal deficit present.      Mental Status: She is alert and oriented to person, place, and time. Mental status is at baseline.   Psychiatric:         Mood and Affect: Mood normal.         Behavior: Behavior normal.       Assessment/Plan   Problem List Items Addressed This Visit       Anemia    Relevant Medications    ferrous gluconate 324 (38 Fe) mg tablet    Essential hypertension     - /60 today in office   - Check CMP + urine albumin   - Continue amlodipine          Relevant Medications    simvastatin (Zocor) 20 mg tablet    olmesartan (BENIcar) 40 mg tablet    metoprolol tartrate (Lopressor) 50 mg tablet    fenofibrate (Tricor) 145 mg tablet    citalopram (CeleXA) 20 mg tablet    cholecalciferol (Vitamin D-3) 50 MCG (2000 UT) tablet    amLODIPine (Norvasc) 10 mg tablet    Other Relevant Orders    Comprehensive Metabolic Panel    Lipid Panel    TSH with reflex to Free T4 if abnormal    Albumin-Creatinine Ratio, Urine Random    Hyperlipidemia     - Continue simvastatin   - Check lipid panel          Relevant Medications    simvastatin (Zocor) 20 mg tablet    olmesartan (BENIcar) 40 mg tablet    metoprolol tartrate (Lopressor) 50 mg tablet    fenofibrate (Tricor) 145 mg tablet    citalopram (CeleXA) 20 mg tablet    cholecalciferol (Vitamin D-3) 50 MCG (2000 UT) tablet    amLODIPine (Norvasc) 10 mg tablet    Obesity    Relevant Medications    simvastatin (Zocor) 20 mg tablet    olmesartan (BENIcar) 40 mg tablet    metoprolol tartrate (Lopressor) 50 mg tablet    fenofibrate (Tricor) 145 mg tablet    citalopram (CeleXA) 20 mg tablet    cholecalciferol (Vitamin D-3) 50 MCG (2000 UT) tablet    amLODIPine (Norvasc) 10 mg tablet    DM type 2 with diabetic mixed hyperlipidemia (Multi)     - Last HbA1c 5.3% 9/2023   - Check HbA1c   - Continue Mounjaro           Relevant Medications    simvastatin (Zocor) 20 mg tablet    olmesartan (BENIcar) 40 mg tablet    metoprolol tartrate (Lopressor) 50 mg tablet    fenofibrate (Tricor) 145 mg tablet    citalopram (CeleXA) 20 mg tablet    cholecalciferol (Vitamin D-3) 50 MCG (2000 UT) tablet    amLODIPine (Norvasc) 10 mg tablet    Other Relevant Orders    Hemoglobin A1C    Referral to Podiatry    Acute pain of left shoulder - Primary     - Pt hurt left shoulder while vacuuming   - Has been using Voltaren gel   - L shoulder XR ordered          Relevant Orders    XR shoulder left 2+ views     Other Visit Diagnoses       Acute pain of left knee        Relevant Medications    diclofenac sodium (Voltaren) 1 % gel    Overactive bladder        Relevant Medications    oxybutynin XL (Ditropan-XL) 10 mg 24 hr tablet    Screening for colon cancer        Relevant Orders    Colonoscopy Screening; Average Risk Patient             # Health maintenance   - Mammogram 1/26/2024 normal   - Colonoscopy: 10/5/2019 showed diverticulosis repeat in 5 years 10/2024  - Dexa: 12/2021 T score -0.2 > repeated 1/2024 showed osteopenia T -1.7 in forearm   - Check CMP, lipids, TSH w/ reflex, HbA1c, urine albumin     Follow up 4 months

## 2024-08-26 ENCOUNTER — APPOINTMENT (OUTPATIENT)
Dept: NEPHROLOGY | Facility: CLINIC | Age: 79
End: 2024-08-26
Payer: MEDICARE

## 2024-08-26 ENCOUNTER — HOSPITAL ENCOUNTER (OUTPATIENT)
Dept: RADIOLOGY | Facility: HOSPITAL | Age: 79
Discharge: HOME | End: 2024-08-26
Payer: MEDICARE

## 2024-08-26 VITALS
DIASTOLIC BLOOD PRESSURE: 71 MMHG | OXYGEN SATURATION: 95 % | HEART RATE: 58 BPM | HEIGHT: 63 IN | TEMPERATURE: 98 F | WEIGHT: 175 LBS | SYSTOLIC BLOOD PRESSURE: 117 MMHG | RESPIRATION RATE: 16 BRPM | BODY MASS INDEX: 31.01 KG/M2

## 2024-08-26 DIAGNOSIS — D63.8 ANEMIA IN OTHER CHRONIC DISEASES CLASSIFIED ELSEWHERE: ICD-10-CM

## 2024-08-26 DIAGNOSIS — I10 ESSENTIAL HYPERTENSION: ICD-10-CM

## 2024-08-26 DIAGNOSIS — N18.32 STAGE 3B CHRONIC KIDNEY DISEASE (MULTI): Primary | ICD-10-CM

## 2024-08-26 DIAGNOSIS — E78.2 DM TYPE 2 WITH DIABETIC MIXED HYPERLIPIDEMIA (MULTI): ICD-10-CM

## 2024-08-26 DIAGNOSIS — E55.9 VITAMIN D DEFICIENCY: ICD-10-CM

## 2024-08-26 DIAGNOSIS — E11.69 DM TYPE 2 WITH DIABETIC MIXED HYPERLIPIDEMIA (MULTI): ICD-10-CM

## 2024-08-26 DIAGNOSIS — M25.512 ACUTE PAIN OF LEFT SHOULDER: ICD-10-CM

## 2024-08-26 DIAGNOSIS — E78.00 PURE HYPERCHOLESTEROLEMIA: ICD-10-CM

## 2024-08-26 PROCEDURE — 1159F MED LIST DOCD IN RCRD: CPT | Performed by: INTERNAL MEDICINE

## 2024-08-26 PROCEDURE — 3074F SYST BP LT 130 MM HG: CPT | Performed by: INTERNAL MEDICINE

## 2024-08-26 PROCEDURE — 3078F DIAST BP <80 MM HG: CPT | Performed by: INTERNAL MEDICINE

## 2024-08-26 PROCEDURE — 99214 OFFICE O/P EST MOD 30 MIN: CPT | Performed by: INTERNAL MEDICINE

## 2024-08-26 PROCEDURE — 73030 X-RAY EXAM OF SHOULDER: CPT | Mod: LT

## 2024-08-26 PROCEDURE — 73030 X-RAY EXAM OF SHOULDER: CPT | Mod: LEFT SIDE | Performed by: RADIOLOGY

## 2024-08-26 NOTE — PATIENT INSTRUCTIONS
Dear HANSA   It was nice seeing you in the nephrology clinic today     Today we discussed the following:     # Chronic kidney disease stage III-possibly related to atherosclerosis. Kidney function is slightly improving now up to 39% from 23% per earlier office visit. We will continue to monitor closely     #Urine urgency and frequency-continue oxybutynin at bedtime     #Hypertension-blood pressures in good control-continue same medications.  Currently are not on water pill    # Iron deficiency anemia-continue oral iron.  Try to take it with vitamin C as possible to increase absorption     Follow-up in 6 months with another repeat blood work and urinalysis prior to next visit     Please call our office if you have any question  Thank you for coming to see me today     Maribel Upton MD, MS, SOO WARREN  Clinical  - [employer]  Nephrologist - [facility] - [facility]

## 2024-08-26 NOTE — PROGRESS NOTES
Subjective       Francheska Ochoa is a 78 y.o. female who has past medical history of type 2 diabetes well-controlled-hypertension, nephrolithiasis and chronic kidney disease was coming to see me today for follow-up. She received oxybutynin for urinary urgency during prior visit.The patient has not been requiring any lasix at this time and was able to lose weight from her prior visit. No complaints at this time. GFR improved to 39% from prior reading of 32% in February. Continues to have no albuminuria. No changes will be made at this time.      Prior Note 2/2024   Patient came today for follow-up.  She came alone.  No kidney related complaints or concerns.  Blood pressure is in target.  She follows low-salt diet.  She is adherent to medications.  She denies being on Lasix anymore.  Most recent blood work done in February 2024 was reviewed and showed stable serum creatinine and GFR.  Within normal extra lites.  She continues to be iron deficient-she is currently on oral iron.  No albuminuria per spot test ACR    Prior notes 8/2023     Last office visit February 2023. We discussed chronic kidney disease. We discussed conservative measures. We started oxybutynin for overactive bladder and urine urgency. Evelyne came alone today. No kidney related complaints or concerns. She ran out of oxybutynin and she experiences some urinary urgency. No leg swelling or shortness of breath. No recent kidney stones. She had blood work done in May 2023 (2 months ago) that showed slightly worsening serum creatinine 2.1 and GFR 23 from 25 prior. Today I discussed with her that the reason for her worsening kidney function is unclear at this time. We discussed possible kidney biopsy-we decided to defer at this time. Urinalysis bland with no albumin. A1c is in target 5.5.     Per prior notes           Patient came today alone. She reports 5-6 years ago she had a procedure that involved a nephrologist but she does not recall the whole  "reason. She does have a past medical history of kidney stones, with ED visits in March 2022. A CT scan confirmed that this stone was causing obstruction in the L kidney. She is no longer having symptoms at this time. She denies dysuria or hematuria. She does claim small urinary leakage and increased frequency/urgency. We will start Oxybutynin for the frequency/urgency. She denies taking NSAIDs for pain. She does not have a family history of kidney stones. She endorses eating healthy, with fruits and vegetables. We educated her on reducing salt in diet. We discussed 50-60 ounces of water daily. She is a former smoker but quit 35+ years ago. Bloodwork was obtained and reviewed from December. GFR is 35 and Cr 1.5. We will repeat bloodwork today to have the most up to date labs. Obtain a kidney US at your earliest connivence. Prior UA did not reveal protein leak but she was unable to leave a sample today. Past medical history of DM, HTN, and HLD. (HPI was taken and written by PA student Isaac Boss)       Objective   /71 (BP Location: Left arm, Patient Position: Standing, BP Cuff Size: Adult)   Pulse 58   Temp 36.7 °C (98 °F)   Resp 16   Ht 1.6 m (5' 3\")   Wt 79.4 kg (175 lb)   SpO2 95%   BMI 31.00 kg/m²   Wt Readings from Last 3 Encounters:   08/26/24 79.4 kg (175 lb)   08/20/24 77.6 kg (171 lb)   05/23/24 81.2 kg (179 lb)       Physical Exam    General appearance: no distress awake and alert on room air, euvolemic on exam  Eyes: non-icteric  HEENT: atrumatic head, PEERLA, moist mucosa  Skin: no apparent rash  Heart: NSR, S1, S2 normal, no murmur or gallop  Lungs: Symmetrical expansion,CTA bilat no wheezing/crackles  Abdomen: soft, nt/nd, obese  Extremities: no edema bilat  Neuro: No FND,asterixis, no focal deficits noticed        Review of Systems     Constitutional: no fever, no chills, no recent weight gain and no recent weight loss.   Eyes: no blurred vision and no diplopia.   ENT: no hearing loss, no " earache, no sore throat, no swollen glands in the neck and no nasal discharge.   Cardiovascular: no chest pain, no palpitations and no lower extremity edema.   Respiratory: no shortness of breath, no chronic cough and no shortness of breath during exertion.   Gastrointestinal: no abdominal pain, no constipation, no heartburn, no vomiting, no bloody stools and no change in bowel movements.   Genitourinary: no dysuria and no hematuria.   Musculoskeletal: no arthralgias and no myalgias.   Skin: no rashes and no skin lesions.   Neurological: no headaches and no dizziness.   Psychiatric: no confusion, no depression and no anxiety.   Endocrine: no heat intolerance, no cold intolerance, appetite not increased, no thyroid disorder, no increased urinary frequency and no dry skin.   Hematologic/Lymphatic: does not bleed easily and does not bruise easily.   All other systems have been reviewed and are negative for complaint.         Data Review        Results from last 7 days   Lab Units 08/20/24  1138   WBC AUTO x10*3/uL 6.8   HEMOGLOBIN g/dL 12.3   HEMATOCRIT % 37.8   PLATELETS AUTO x10*3/uL 221            Lab Results   Component Value Date    URICACID 6.3 02/14/2024           Lab Results   Component Value Date    HGBA1C 5.2 08/20/2024           Results from last 7 days   Lab Units 08/20/24  1138   SODIUM mmol/L 138   POTASSIUM mmol/L 4.5   CHLORIDE mmol/L 100   CO2 mmol/L 28   BUN mg/dL 32*   GLUCOSE mg/dL 101*   CALCIUM mg/dL 9.6   ANION GAP mmol/L 15   EGFR mL/min/1.73m*2 39*           Albumin/Creatinine Ratio   Date Value Ref Range Status   08/20/2024 11.0 <30.0 ug/mg Creat Final   02/14/2024 16.7 <30.0 ug/mg Creat Final     Albumin/Creatine Ratio   Date Value Ref Range Status   03/06/2023 13.9 0.0 - 30.0 ug/mg crt Final            RFP  Recent Labs     08/20/24  1138 02/14/24  1356 09/19/23  1057 06/05/23  1249 02/16/23  1600 12/02/22  1042 05/17/22  0952    139 142 140 139 141 140   K 4.5 4.4 5.1 5.1 4.7 4.6 5.2     103 105 103 100 102 103   CO2 28 27 28 28 31 31 29   BUN 32* 38* 30* 60* 53* 40* 37*   CREATININE 1.36* 1.60* 1.49* 2.10* 1.98* 1.51* 1.38*   GLUCOSE 101* 93 80 92 99 115* 95   CALCIUM 9.6 9.3 9.8 9.9 9.7 10.0 10.2   PHOS 4.2 4.1  --   --  4.1  --   --    EGFR 39* 32*  --   --   --   --   --    ANIONGAP 15 13 14 14 13 13 13        Urineanalysis  Recent Labs     02/14/24  1356 02/16/23  1600 12/19/22  1235 05/17/22  0952 03/30/22  1407 03/28/22  1830 05/14/21  1055 11/11/20  1100 05/26/20  0904 11/01/19  0928 01/15/19  0934   COLORU Yellow YELLOW LIGHT YELLOW YELLOW YELLOW STRAW YELLOW STRAW YELLOW YELLOW MARLA   APPEARANCEU Clear HAZY CLEAR CLEAR HAZY CLEAR CLEAR CLEAR CLEAR CLEAR HAZY   SPECGRAVU 1.011 1.010 1.020 1.009 1.011 1.010 1.025 1.025 1.016 1.016 1.018   JON 6.0 6.0 6.0 5.0 6.0 7.0 5.5 5.5 5.0 5.0 5.0   PROTUR NEGATIVE NEGATIVE NEGATIVE NEGATIVE 100(2+)* NEGATIVE 30 (1+)* NEGATIVE NEGATIVE NEGATIVE NEGATIVE   GLUCOSEU NEGATIVE NEGATIVE NEGATIVE NEGATIVE 50(1+)* 50(1+)* NEGATIVE NEGATIVE NEGATIVE NEGATIVE NEGATIVE   BLOODU NEGATIVE NEGATIVE NEGATIVE NEGATIVE MODERATE(2+)* MODERATE(2+)* NEGATIVE NEGATIVE NEGATIVE NEGATIVE NEGATIVE   KETONESU NEGATIVE NEGATIVE NEGATIVE NEGATIVE NEGATIVE NEGATIVE NEGATIVE NEGATIVE NEGATIVE NEGATIVE NEGATIVE   BILIRUBINU NEGATIVE NEGATIVE NEGATIVE NEGATIVE NEGATIVE NEGATIVE NEGATIVE NEGATIVE NEGATIVE NEGATIVE NEGATIVE   NITRITEU NEGATIVE NEGATIVE NEGATIVE NEGATIVE NEGATIVE NEGATIVE NEGATIVE NEGATIVE NEGATIVE NEGATIVE NEGATIVE   LEUKOCYTESU TRACE* LARGE(3+)* TRACE* MODERATE(2+)* MODERATE(2+)* SMALL(1+)* SMALL (1+)* TRACE* NEGATIVE NEGATIVE SMALL (1+)*       Urine Electrolytes  Recent Labs     08/20/24  1224 02/14/24  1356 03/06/23  0948 02/16/23  1600 12/19/22  1235 05/17/22  0952 03/30/22  1407 03/28/22  1830 12/14/21  0745 05/14/21  1055 11/11/20  1100 05/26/20  0904 11/01/19  0928 01/15/19  0934 07/10/18  0913 10/17/17  0930   CREATU 118.0 44.8  44.3 63.4 36.7  --   48.0  --   --  66.1 127.0 72.0 68.2 66.9 122.0 68.5 86.6   PROTUR  --  NEGATIVE  --  NEGATIVE NEGATIVE NEGATIVE 100(2+)* NEGATIVE  --  30 (1+)* NEGATIVE NEGATIVE NEGATIVE NEGATIVE  --   --    UTPCR  --  0.09  --   --   --   --   --   --   --   --   --   --   --   --   --   --    ALBUMINUR 13.0 7.5  --   --   --   --   --   --   --   --   --   --   --   --   --   --    MICROALBCREA 11.0 16.7 13.9 24.5  --  SEE COMMENT  --   --  18.6 132.0* 32.2* 32.4* SEE COMMENT 27.5 <7.3 17.0        Urine Micro  Recent Labs     02/14/24  1356 02/16/23  1600 12/19/22  1235 05/17/22  0952 03/30/22  1407 03/28/22  1830 05/14/21  1055 11/11/20  1100 01/15/19  0934   WBCU NONE 17* 1 0-5 43* 0-5 5-20* 0-5 18*   RBCU NONE 7* <1 0-5 10* 0-5 0-5 NONE 1   HYALCASTU  --   --   --   --   --   --  OCCASIONAL*  --  2+*   SQUAMEPIU  --  <1  --  FEW <1 1+  --  FEW 2   BACTERIAU  --   --   --   --  1+* 1+*  --   --  4+*   MUCUSU  --  FEW FEW  --  FEW 1+  --   --  1+        Iron  Recent Labs     08/20/24  1138 02/14/24  1356 02/16/23  1600 11/01/19  0927   IRON 72 56 58  --    TIBC 351 390 448*  --    IRONSAT 21* 14* 13*  --    FERRITIN 324* 181* 88 57          Current Outpatient Medications on File Prior to Visit   Medication Sig Dispense Refill    amLODIPine (Norvasc) 10 mg tablet Take 1 tablet (10 mg) by mouth once daily. 90 tablet 3    Autolet lancing device 1 each 2 times a day. CHECK BLOOD SUGAR TWICE DAILY 100 each 1    blood sugar diagnostic (Accu-Chek Guide test strips) strip 1 strip 2 times a day. CHECK BLOOD SUGAR TWICE DAILY 100 strip 2    cholecalciferol (Vitamin D-3) 50 MCG (2000 UT) tablet Take 1 tablet (50 mcg) by mouth once daily. 90 tablet 1    citalopram (CeleXA) 20 mg tablet Take 1 tablet (20 mg) by mouth once every day. 90 tablet 1    cranberry 400 mg capsule Take 1 tablet by mouth once daily.      fenofibrate (Tricor) 145 mg tablet Take 1 tablet (145 mg) by mouth once daily. 90 tablet 3    ferrous gluconate 324 (38 Fe) mg  tablet Take 1 tablet (324 mg) by mouth 2 times a day. 180 tablet 2    lutein 6 mg capsule Take by mouth.      metoprolol tartrate (Lopressor) 50 mg tablet Take 1 tablet by mouth 2 times a day. 180 tablet 1    olmesartan (BENIcar) 40 mg tablet Take 1 tablet (40 mg) by mouth once daily. 90 tablet 1    oxybutynin XL (Ditropan-XL) 10 mg 24 hr tablet Take 1 tablet (10 mg) by mouth once daily in the evening. Do not crush, chew, or split. 90 tablet 1    simvastatin (Zocor) 20 mg tablet Take 1 tablet (20 mg) by mouth once every day. 90 tablet 1    tirzepatide (Mounjaro) 5 mg/0.5 mL pen injector INJECT 5MG (1 PEN) UNDER THE SKIN EVERY WEEK 2 mL 3    diclofenac sodium (Voltaren) 1 % gel Apply 2.25 inches (2 g) topically 2 times a day as needed (To affected areas). (Patient not taking: Reported on 8/26/2024) 200 g 0    [DISCONTINUED] amLODIPine (Norvasc) 10 mg tablet Take 1 tablet (10 mg) by mouth once daily. 90 tablet 3    [DISCONTINUED] cholecalciferol (Vitamin D-3) 50 MCG (2000 UT) tablet Take 1 tablet (50 mcg) by mouth once daily. 90 tablet 1    [DISCONTINUED] citalopram (CeleXA) 20 mg tablet Take 1 tablet (20 mg) by mouth once every day. 90 tablet 1    [DISCONTINUED] diclofenac sodium (Voltaren) 1 % gel APPLY 4.5 INCHES OF GEL (4 GRAMS) TOPICALLY FOUR TIMES A DAY. 200 g 0    [DISCONTINUED] fenofibrate (Tricor) 145 mg tablet Take 1 tablet (145 mg) by mouth once daily. 90 tablet 3    [DISCONTINUED] ferrous gluconate 324 (38 Fe) mg tablet Take 1 tablet (324 mg) by mouth 2 times a day. 180 tablet 2    [DISCONTINUED] metoprolol tartrate (Lopressor) 50 mg tablet Take 1 tablet (50 mg) by mouth 2 times a day. 180 tablet 1    [DISCONTINUED] olmesartan (BENIcar) 40 mg tablet Take 1 tablet (40 mg) by mouth once daily. 90 tablet 1    [DISCONTINUED] oxybutynin XL (Ditropan-XL) 10 mg 24 hr tablet Take 1 tablet (10 mg) by mouth once daily in the evening. Do not crush, chew, or split. 90 tablet 1    [DISCONTINUED] simvastatin (Zocor) 20  mg tablet Take 1 tablet (20 mg) by mouth once every day. 90 tablet 1     No current facility-administered medications on file prior to visit.           Assessment and Plan        Ms. Ochoa is a 78-year-old female with past medical history of type 2 diabetes well-controlled-hypertension well-controlled, nephrolithiasis and chronic kidney disease was coming to see me today for follow-up     Impression  #Chronic kidney disease stage IIIb-4/A1-progressive possibly atherosclerotic cardiovascular disease  Most recent serum creatinine 1.36, GFR 39 in August 2024-slightly improving from prior.   -Within normal electrolytes with no significant acidosis  -Spot test albumin creatinine issues negative  -Follow conservative measures. Continue RAAS inhibitors and GLP-1 RA (Mounjaro)     #Hypertension-well-controlled. Current medication amlodipine 10 mg,  metoprolol 50 mg BID, olmesartan 40 mg.  Not taking Furosemide 20 mg. No indication to restart     #Type 2 diabetes-well-controlled. On GLP 1 RA. No albuminuria     #Nephrolithiasis-repeat kidney image/kidney ultrasound in February 2023 negative for nephrolithiasis or hydroureter/hydronephrosis. No recent nephrolithiasis activity. Consider metabolic work-up if needed     #Urine urgency and frequency-possible pelvic muscle weakness and overactive bladder. Oxybutynin at bedtime     #[id number] normal PTH, vitamin D, phosphorus and calcium  #Anemia- Hgb 11.9, iron 72, TIBC 351, % saturation 21, Ferritin 324  #CVS-low risk  -On statins, RAAS inhibitors and Mounjaro      Follow-up in 6 months with repeat blood work and urinalysis prior to next visit    Kingsley Chavez, DO  Internal Medicine PGY 1

## 2024-10-09 DIAGNOSIS — M25.562 ACUTE PAIN OF LEFT KNEE: ICD-10-CM

## 2024-10-09 RX ORDER — DICLOFENAC SODIUM 10 MG/G
GEL TOPICAL
Qty: 200 G | Refills: 0 | Status: SHIPPED | OUTPATIENT
Start: 2024-10-09

## 2024-10-16 DIAGNOSIS — N32.81 OVERACTIVE BLADDER: ICD-10-CM

## 2024-10-16 RX ORDER — OXYBUTYNIN CHLORIDE 10 MG/1
10 TABLET, EXTENDED RELEASE ORAL NIGHTLY
Qty: 90 TABLET | Refills: 3 | Status: SHIPPED | OUTPATIENT
Start: 2024-10-16

## 2024-10-24 DIAGNOSIS — E11.69 DM TYPE 2 WITH DIABETIC MIXED HYPERLIPIDEMIA (MULTI): ICD-10-CM

## 2024-10-24 DIAGNOSIS — E78.2 DM TYPE 2 WITH DIABETIC MIXED HYPERLIPIDEMIA (MULTI): ICD-10-CM

## 2024-10-24 RX ORDER — TIRZEPATIDE 5 MG/.5ML
INJECTION, SOLUTION SUBCUTANEOUS
Qty: 2 ML | Refills: 3 | Status: SHIPPED | OUTPATIENT
Start: 2024-10-24

## 2024-11-05 ENCOUNTER — APPOINTMENT (OUTPATIENT)
Dept: PODIATRY | Facility: CLINIC | Age: 79
End: 2024-11-05
Payer: MEDICARE

## 2024-11-11 ENCOUNTER — TELEMEDICINE (OUTPATIENT)
Dept: PRIMARY CARE | Facility: CLINIC | Age: 79
End: 2024-11-11
Payer: MEDICARE

## 2024-11-11 ENCOUNTER — APPOINTMENT (OUTPATIENT)
Dept: OPERATING ROOM | Facility: HOSPITAL | Age: 79
End: 2024-11-11
Payer: MEDICARE

## 2024-11-11 DIAGNOSIS — J06.9 UPPER RESPIRATORY TRACT INFECTION, UNSPECIFIED TYPE: Primary | ICD-10-CM

## 2024-11-11 PROCEDURE — 99442 PR PHYS/QHP TELEPHONE EVALUATION 11-20 MIN: CPT | Performed by: INTERNAL MEDICINE

## 2024-11-11 RX ORDER — BENZONATATE 100 MG/1
100 CAPSULE ORAL 3 TIMES DAILY PRN
Qty: 42 CAPSULE | Refills: 0 | Status: SHIPPED | OUTPATIENT
Start: 2024-11-11 | End: 2024-12-11

## 2024-11-11 ASSESSMENT — ENCOUNTER SYMPTOMS
COUGH: 1
SORE THROAT: 1
HEADACHES: 0
NAUSEA: 1

## 2024-11-11 NOTE — PROGRESS NOTES
Chief Complaint:   Chief Complaint   Patient presents with    URI        Francheska Ochoa is a 79 y.o. year old female who presents to the clinic for a virtual visit.  URI   The current episode started in the past 7 days. The problem has been gradually improving. There has been no fever. Associated symptoms include congestion, coughing, nausea and a sore throat. Pertinent negatives include no chest pain or headaches.        Past Medical History   Past Medical History:   Diagnosis Date    Body mass index (BMI) 32.0-32.9, adult 05/17/2022    Body mass index (BMI) of 32.0 to 32.9 in adult    Body mass index (BMI) 33.0-33.9, adult 09/02/2022    BMI 33.0-33.9,adult    Body mass index (BMI) 34.0-34.9, adult 04/08/2022    Body mass index (BMI) of 34.0 to 34.9 in adult    Body mass index (BMI) 35.0-35.9, adult 11/12/2021    BMI 35.0-35.9,adult    Body mass index (BMI) 36.0-36.9, adult 08/13/2021    BMI 36.0-36.9,adult    Body mass index (BMI) 37.0-37.9, adult 04/15/2019    BMI 37.0-37.9, adult    Morbid (severe) obesity due to excess calories (Multi) 04/08/2022    Severe obesity (BMI 35.0-35.9 with comorbidity)    Personal history of other endocrine, nutritional and metabolic disease 02/11/2022    History of obesity      Past Surgical History:   No past surgical history on file.  Family History:   Family History   Problem Relation Name Age of Onset    Colon cancer Mother      Other (Diabetes Mellitus) Father      Other (Thyroid Disorder) Sister       Social History:   Tobacco Use: Low Risk  (6/12/2024)    Patient History     Smoking Tobacco Use: Never     Smokeless Tobacco Use: Never     Passive Exposure: Not on file   Recent Concern: Tobacco Use - Medium Risk (6/3/2024)    Received from [facility]    Patient History     Smoking Tobacco Use: Former     Smokeless Tobacco Use: Never     Passive Exposure: Not on file      Social History     Substance and Sexual Activity   Alcohol Use Never     "    Allergies:   Allergies   Allergen Reactions    Codeine Other     UPSET STOMACH    Latex Rash     LATEX GLOVES        ROS   Review of Systems   HENT:  Positive for congestion and sore throat.    Respiratory:  Positive for cough.    Cardiovascular:  Negative for chest pain.   Gastrointestinal:  Positive for nausea.   Neurological:  Negative for headaches.        Objective   There were no vitals filed for this visit.   BMI Readings from Last 15 Encounters:   08/26/24 31.00 kg/m²   08/20/24 30.29 kg/m²   05/23/24 31.71 kg/m²   04/09/24 29.87 kg/m²   02/26/24 31.99 kg/m²   12/19/23 30.82 kg/m²   09/19/23 31.18 kg/m²   08/22/23 31.71 kg/m²   06/05/23 32.42 kg/m²   03/06/23 33.30 kg/m²   02/16/23 33.92 kg/m²   12/02/22 34.37 kg/m²   09/02/22 33.48 kg/m²   05/17/22 32.78 kg/m²      79.4 kg (175 lb) (8/26/2024  1:34 PM)      Physical Exam  Physical Exam     Labs:  CBC:  Recent Labs     08/20/24  1138 02/14/24  1356 02/16/23  1600   WBC 6.8 6.4 6.4   HGB 12.3 11.9* 11.6*   HCT 37.8 38.0 36.3    200 216   * 103* 97     CMP:  Recent Labs     08/20/24  1138 02/14/24  1356 09/19/23  1057    139 142   K 4.5 4.4 5.1    103 105   CO2 28 27 28   ANIONGAP 15 13 14   BUN 32* 38* 30*   CREATININE 1.36* 1.60* 1.49*   EGFR 39* 32*  --    GLUCOSE 101* 93 80     Recent Labs     08/20/24  1138 02/14/24  1356 09/19/23  1057 06/05/23  1249 03/30/22  1430 03/28/22  1801   ALBUMIN 3.9 3.8 4.1 4.1   < > 4.4   ALKPHOS 40  --  41 46   < > 54   ALT 11  --  11 25   < > 15   AST 15  --  14 28   < > 18   BILITOT 0.4  --  0.5 0.5   < > 0.4   LIPASE  --   --   --   --   --  43    < > = values in this interval not displayed.     Calcium/Phos:   Lab Results   Component Value Date    CALCIUM 9.6 08/20/2024    PHOS 4.2 08/20/2024      COAG: No results for input(s): \"INR\", \"DDIMERVTE\" in the last 35116 hours.  CRP: No results found for: \"CRP\"   [unfilled]   ENDO:  Recent Labs     08/20/24  1138 04/09/24  1041 09/19/23  1057 " "06/05/23  1249 03/06/23  0948 05/17/22  0952 12/14/21  0745 05/14/21  1055   TSH 1.35  --   --   --  1.89 2.09  --  2.48   HGBA1C 5.2 5.6 5.2 5.5 5.9* 5.5   < > 6.9    < > = values in this interval not displayed.      CARDIAC: No results for input(s): \"LDH\", \"CKMB\", \"TROPHS\", \"BNP\" in the last 48812 hours.    No lab exists for component: \"CK\", \"CKMBP\"  Recent Labs     08/20/24  1138 03/06/23  0948 05/17/22  0952 12/14/21  0745   CHOL 136 127 134 138   LDLF  --  63 65 69   LDLCALC 63  --   --   --    HDL 55.7 45.4 50.8 50.1   TRIG 88 94 89 94     No data recorded    Current Medications:  Current Outpatient Medications   Medication Sig Dispense Refill    amLODIPine (Norvasc) 10 mg tablet Take 1 tablet (10 mg) by mouth once daily. 90 tablet 3    Autolet lancing device 1 each 2 times a day. CHECK BLOOD SUGAR TWICE DAILY 100 each 1    benzonatate (Tessalon) 100 mg capsule Take 1 capsule (100 mg) by mouth 3 times a day as needed for cough. Do not crush or chew. 42 capsule 0    blood sugar diagnostic (Accu-Chek Guide test strips) strip 1 strip 2 times a day. CHECK BLOOD SUGAR TWICE DAILY 100 strip 2    cholecalciferol (Vitamin D-3) 50 MCG (2000 UT) tablet Take 1 tablet (50 mcg) by mouth once daily. 90 tablet 1    citalopram (CeleXA) 20 mg tablet Take 1 tablet (20 mg) by mouth once every day. 90 tablet 1    cranberry 400 mg capsule Take 1 tablet by mouth once daily.      diclofenac sodium (Voltaren) 1 % gel APPLY TO THE AFFECTED AREAS 2 GRAMS (2.25 INCHES) TOPICALLY TWICE DAILY AS NEEDED 200 g 0    fenofibrate (Tricor) 145 mg tablet Take 1 tablet (145 mg) by mouth once daily. 90 tablet 3    ferrous gluconate 324 (38 Fe) mg tablet Take 1 tablet (324 mg) by mouth 2 times a day. 180 tablet 2    lutein 6 mg capsule Take by mouth.      metoprolol tartrate (Lopressor) 50 mg tablet Take 1 tablet by mouth 2 times a day. 180 tablet 1    olmesartan (BENIcar) 40 mg tablet Take 1 tablet (40 mg) by mouth once daily. 90 tablet 1    " oxybutynin XL (Ditropan-XL) 10 mg 24 hr tablet TAKE 1 TABLET BY MOUTH EVERYDAY AT BEDTIME 90 tablet 3    simvastatin (Zocor) 20 mg tablet Take 1 tablet (20 mg) by mouth once every day. 90 tablet 1    tirzepatide (Mounjaro) 5 mg/0.5 mL pen injector INJECT 5MG (1 PEN) UNDER THE SKIN EVERY WEEK 2 mL 3     No current facility-administered medications for this visit.       Assessment and Plan  Francheska was seen today for uri.  Diagnoses and all orders for this visit:  Upper respiratory tract infection, unspecified type (Primary)  -     benzonatate (Tessalon) 100 mg capsule; Take 1 capsule (100 mg) by mouth 3 times a day as needed for cough. Do not crush or chew.     Viral URI.  Supportive therapy with fluids, PRN NSAIDs or Tylenol and Tessalon pearls for cough.  Use saline spray every 2-3 hrs.  Let us know if symptoms are not better in a week.    An interactive audio and video telecommunication system which permits real time communications between the patient (at the originating site) and provider (at the distant site) was utilized to provide this telehealth service.    Verbal consent was requested and obtained from the patient on the day of encounter.  This is a virtual visit using HIPAA compliant video platform. It required patient-provider interaction for the medical decision making as documented.     I have communicated my name and active licensure. The patient's identity and physical location were verified at the time of this visit.   Either the patient or their legal representative has been informed of the risks and benefits of -- and alternatives to -- treatment through a remote evaluation and consents to proceed with the evaluation remotely.    Total time spent was 12  minutes for this encounter, including chart review, discussion with the patient and addressing their concerns.    Immunizations:  Immunization History   Administered Date(s) Administered    Flu vaccine, quadrivalent, high-dose, preservative free,  age 65y+ (FLUZONE) 09/19/2023    Influenza, seasonal, injectable 09/02/2022    Pfizer Purple Cap SARS-CoV-2 02/22/2021, 03/22/2021, 11/05/2021    Pneumococcal, Unspecified 08/27/2015, 10/17/2017, 10/15/2018    Tdap vaccine, age 7 year and older (BOOSTRIX, ADACEL) 08/25/2015

## 2024-12-06 ENCOUNTER — TELEMEDICINE (OUTPATIENT)
Dept: PRIMARY CARE | Facility: CLINIC | Age: 79
End: 2024-12-06
Payer: MEDICARE

## 2024-12-06 DIAGNOSIS — M25.562 ACUTE PAIN OF LEFT KNEE: ICD-10-CM

## 2024-12-06 DIAGNOSIS — I10 ESSENTIAL HYPERTENSION: ICD-10-CM

## 2024-12-06 DIAGNOSIS — E78.2 DM TYPE 2 WITH DIABETIC MIXED HYPERLIPIDEMIA (MULTI): ICD-10-CM

## 2024-12-06 DIAGNOSIS — J06.9 UPPER RESPIRATORY TRACT INFECTION, UNSPECIFIED TYPE: Primary | ICD-10-CM

## 2024-12-06 DIAGNOSIS — E11.69 DM TYPE 2 WITH DIABETIC MIXED HYPERLIPIDEMIA (MULTI): ICD-10-CM

## 2024-12-06 DIAGNOSIS — N32.81 OVERACTIVE BLADDER: ICD-10-CM

## 2024-12-06 DIAGNOSIS — D64.9 ANEMIA, UNSPECIFIED TYPE: ICD-10-CM

## 2024-12-06 DIAGNOSIS — E78.5 HYPERLIPIDEMIA, UNSPECIFIED HYPERLIPIDEMIA TYPE: ICD-10-CM

## 2024-12-06 PROCEDURE — 99214 OFFICE O/P EST MOD 30 MIN: CPT | Performed by: INTERNAL MEDICINE

## 2024-12-06 PROCEDURE — G2211 COMPLEX E/M VISIT ADD ON: HCPCS | Performed by: INTERNAL MEDICINE

## 2024-12-06 RX ORDER — BENZONATATE 100 MG/1
100 CAPSULE ORAL 3 TIMES DAILY PRN
Qty: 42 CAPSULE | Refills: 0 | Status: SHIPPED | OUTPATIENT
Start: 2024-12-06 | End: 2025-01-05

## 2024-12-06 RX ORDER — OLMESARTAN MEDOXOMIL 40 MG/1
40 TABLET ORAL DAILY
Qty: 90 TABLET | Refills: 1 | Status: SHIPPED | OUTPATIENT
Start: 2024-12-06

## 2024-12-06 RX ORDER — OXYBUTYNIN CHLORIDE 10 MG/1
10 TABLET, EXTENDED RELEASE ORAL NIGHTLY
Qty: 90 TABLET | Refills: 3 | Status: SHIPPED | OUTPATIENT
Start: 2024-12-06

## 2024-12-06 RX ORDER — METOPROLOL TARTRATE 50 MG/1
50 TABLET ORAL 2 TIMES DAILY
Qty: 180 TABLET | Refills: 1 | Status: SHIPPED | OUTPATIENT
Start: 2024-12-06 | End: 2025-06-04

## 2024-12-06 RX ORDER — AMLODIPINE BESYLATE 10 MG/1
10 TABLET ORAL DAILY
Qty: 90 TABLET | Refills: 3 | Status: SHIPPED | OUTPATIENT
Start: 2024-12-06

## 2024-12-06 RX ORDER — CHOLECALCIFEROL (VITAMIN D3) 50 MCG
50 TABLET ORAL DAILY
Qty: 90 TABLET | Refills: 1 | Status: SHIPPED | OUTPATIENT
Start: 2024-12-06

## 2024-12-06 RX ORDER — CITALOPRAM 20 MG/1
20 TABLET, FILM COATED ORAL
Qty: 90 TABLET | Refills: 1 | Status: SHIPPED | OUTPATIENT
Start: 2024-12-06

## 2024-12-06 RX ORDER — DOXYCYCLINE 100 MG/1
100 CAPSULE ORAL 2 TIMES DAILY
Qty: 14 CAPSULE | Refills: 0 | Status: SHIPPED | OUTPATIENT
Start: 2024-12-06 | End: 2024-12-13

## 2024-12-06 RX ORDER — FERROUS GLUCONATE 324(38)MG
1 TABLET ORAL 2 TIMES DAILY
Qty: 180 TABLET | Refills: 2 | Status: SHIPPED | OUTPATIENT
Start: 2024-12-06 | End: 2025-12-06

## 2024-12-06 RX ORDER — SIMVASTATIN 20 MG/1
20 TABLET, FILM COATED ORAL
Qty: 90 TABLET | Refills: 1 | Status: SHIPPED | OUTPATIENT
Start: 2024-12-06

## 2024-12-06 RX ORDER — FENOFIBRATE 145 MG/1
145 TABLET, FILM COATED ORAL DAILY
Qty: 90 TABLET | Refills: 3 | Status: SHIPPED | OUTPATIENT
Start: 2024-12-06

## 2024-12-06 ASSESSMENT — ENCOUNTER SYMPTOMS
NUMBNESS: 0
CHILLS: 1
FATIGUE: 1
HEADACHES: 0
PALPITATIONS: 0
COUGH: 1
DIARRHEA: 1
VOMITING: 0
NAUSEA: 0
ABDOMINAL PAIN: 0
WEAKNESS: 0
DIZZINESS: 0
SINUS PRESSURE: 1

## 2024-12-06 NOTE — PROGRESS NOTES
I reviewed the resident/fellow's documentation and discussed the patient with the resident/fellow. I agree with the resident/fellow's medical decision making as documented in the note.  As the attending physician,  I reviewed the relevant imaging studies and available reports. I also discussed the differential diagnosis and all of the proposed management plans with the resident.  An interactive audio and video telecommunication system which permits real time communications between the patient (at the originating site) and provider (at the distant site) was utilized to provide this telehealth service.    Verbal consent was requested and obtained from the patient on the day of encounter.  This is a virtual visit using HIPAA compliant video platform. It required patient-provider interaction for the medical decision making as documented.         Shruti Murphy MD

## 2024-12-06 NOTE — PROGRESS NOTES
Subjective   Francheska Ochoa is a 79 y.o. female with PMH of HTN, HLD, T2DM, hx basal cell carcinoma, depression, and CKD who presents for No chief complaint on file.    Patient states that she has been feeling extremely congested for the past 2 weeks. She is coughing but states she has been unable to bring anything up. She was feeling better initially but then her  was hospitalized for a stroke and she feel like she has been worse since then. Admits to chills about 2 days ago but denies fevers. Also endorses fatigue and significant nasal congestion, yellowish in color. She has had some loose stool as well.         Review of Systems   Constitutional:  Positive for chills and fatigue.   HENT:  Positive for congestion and sinus pressure.    Respiratory:  Positive for cough.         Productive cough    Cardiovascular:  Negative for chest pain and palpitations.   Gastrointestinal:  Positive for diarrhea. Negative for abdominal pain, nausea and vomiting.   Neurological:  Negative for dizziness, weakness, numbness and headaches.       Objective   Unable to perform PE due to virtual visit     Assessment/Plan   Problem List Items Addressed This Visit       Anemia    Relevant Medications    ferrous gluconate 324 (38 Fe) mg tablet    Essential hypertension    Relevant Medications    simvastatin (Zocor) 20 mg tablet    olmesartan (BENIcar) 40 mg tablet    metoprolol tartrate (Lopressor) 50 mg tablet    fenofibrate (Tricor) 145 mg tablet    citalopram (CeleXA) 20 mg tablet    amLODIPine (Norvasc) 10 mg tablet    cholecalciferol (Vitamin D-3) 50 MCG (2000 UT) tablet    Hyperlipidemia    Relevant Medications    simvastatin (Zocor) 20 mg tablet    olmesartan (BENIcar) 40 mg tablet    metoprolol tartrate (Lopressor) 50 mg tablet    fenofibrate (Tricor) 145 mg tablet    citalopram (CeleXA) 20 mg tablet    amLODIPine (Norvasc) 10 mg tablet    cholecalciferol (Vitamin D-3) 50 MCG (2000 UT) tablet    DM type 2 with diabetic  mixed hyperlipidemia (Multi)    Relevant Medications    simvastatin (Zocor) 20 mg tablet    olmesartan (BENIcar) 40 mg tablet    metoprolol tartrate (Lopressor) 50 mg tablet    fenofibrate (Tricor) 145 mg tablet    citalopram (CeleXA) 20 mg tablet    amLODIPine (Norvasc) 10 mg tablet    cholecalciferol (Vitamin D-3) 50 MCG (2000 UT) tablet    Upper respiratory tract infection - Primary     - Pt has been sick for 2 weeks   - Start Doxy 100mg BID x 7 days   - Start Tessalon perles   - Start Mucinex DM   - Advised her to call if symptoms worsen          Relevant Medications    doxycycline (Vibramycin) 100 mg capsule    benzonatate (Tessalon) 100 mg capsule    dextromethorphan-guaifenesin (Mucinex DM)  mg 12 hr tablet     Other Visit Diagnoses       Overactive bladder        Relevant Medications    oxybutynin XL (Ditropan-XL) 10 mg 24 hr tablet    Acute pain of left knee

## 2024-12-06 NOTE — ASSESSMENT & PLAN NOTE
- Pt has been sick for 2 weeks   - Start Doxy 100mg BID x 7 days   - Start Tessalon perles   - Start Mucinex DM   - Advised her to call if symptoms worsen

## 2024-12-10 ENCOUNTER — APPOINTMENT (OUTPATIENT)
Dept: PRIMARY CARE | Facility: CLINIC | Age: 79
End: 2024-12-10
Payer: MEDICARE

## 2024-12-11 ENCOUNTER — OFFICE VISIT (OUTPATIENT)
Dept: PRIMARY CARE | Facility: CLINIC | Age: 79
End: 2024-12-11
Payer: MEDICARE

## 2024-12-11 VITALS
SYSTOLIC BLOOD PRESSURE: 123 MMHG | WEIGHT: 180 LBS | DIASTOLIC BLOOD PRESSURE: 68 MMHG | HEART RATE: 54 BPM | BODY MASS INDEX: 31.89 KG/M2 | HEIGHT: 63 IN

## 2024-12-11 DIAGNOSIS — I10 ESSENTIAL HYPERTENSION: ICD-10-CM

## 2024-12-11 DIAGNOSIS — E78.5 HYPERLIPIDEMIA, UNSPECIFIED HYPERLIPIDEMIA TYPE: ICD-10-CM

## 2024-12-11 DIAGNOSIS — E11.69 DM TYPE 2 WITH DIABETIC MIXED HYPERLIPIDEMIA (MULTI): Primary | ICD-10-CM

## 2024-12-11 DIAGNOSIS — E78.2 DM TYPE 2 WITH DIABETIC MIXED HYPERLIPIDEMIA (MULTI): Primary | ICD-10-CM

## 2024-12-11 DIAGNOSIS — Z12.39 ENCOUNTER FOR SCREENING FOR MALIGNANT NEOPLASM OF BREAST, UNSPECIFIED SCREENING MODALITY: ICD-10-CM

## 2024-12-11 DIAGNOSIS — Z12.31 SCREENING MAMMOGRAM FOR BREAST CANCER: ICD-10-CM

## 2024-12-11 DIAGNOSIS — N18.5 STAGE 5 CHRONIC KIDNEY DISEASE NOT ON CHRONIC DIALYSIS (MULTI): ICD-10-CM

## 2024-12-11 PROBLEM — F32.0 DEPRESSION, MAJOR, SINGLE EPISODE, MILD (CMS-HCC): Status: RESOLVED | Noted: 2023-01-16 | Resolved: 2024-12-11

## 2024-12-11 PROCEDURE — 1170F FXNL STATUS ASSESSED: CPT | Performed by: INTERNAL MEDICINE

## 2024-12-11 PROCEDURE — 1159F MED LIST DOCD IN RCRD: CPT | Performed by: INTERNAL MEDICINE

## 2024-12-11 PROCEDURE — 99214 OFFICE O/P EST MOD 30 MIN: CPT | Performed by: INTERNAL MEDICINE

## 2024-12-11 PROCEDURE — 3078F DIAST BP <80 MM HG: CPT | Performed by: INTERNAL MEDICINE

## 2024-12-11 PROCEDURE — 3074F SYST BP LT 130 MM HG: CPT | Performed by: INTERNAL MEDICINE

## 2024-12-11 PROCEDURE — G0439 PPPS, SUBSEQ VISIT: HCPCS | Performed by: INTERNAL MEDICINE

## 2024-12-11 PROCEDURE — 1036F TOBACCO NON-USER: CPT | Performed by: INTERNAL MEDICINE

## 2024-12-11 ASSESSMENT — LIFESTYLE VARIABLES
AUDIT-C TOTAL SCORE: 0
HOW OFTEN DO YOU HAVE A DRINK CONTAINING ALCOHOL: NEVER
HOW MANY STANDARD DRINKS CONTAINING ALCOHOL DO YOU HAVE ON A TYPICAL DAY: PATIENT DOES NOT DRINK
HOW OFTEN DO YOU HAVE SIX OR MORE DRINKS ON ONE OCCASION: NEVER
SKIP TO QUESTIONS 9-10: 1

## 2024-12-11 ASSESSMENT — ENCOUNTER SYMPTOMS
SHORTNESS OF BREATH: 0
DIARRHEA: 0
VOMITING: 0
WEAKNESS: 0
PALPITATIONS: 0
ABDOMINAL PAIN: 0
ABDOMINAL DISTENTION: 0
FATIGUE: 0
FEVER: 0
WHEEZING: 0
LIGHT-HEADEDNESS: 0
AGITATION: 0
COUGH: 0
NAUSEA: 0
DIZZINESS: 0
CONFUSION: 0
ACTIVITY CHANGE: 0

## 2024-12-11 ASSESSMENT — ACTIVITIES OF DAILY LIVING (ADL)
DRESSING: INDEPENDENT
TAKING_MEDICATION: INDEPENDENT
MANAGING_FINANCES: INDEPENDENT
DOING_HOUSEWORK: INDEPENDENT
BATHING: INDEPENDENT
GROCERY_SHOPPING: INDEPENDENT

## 2024-12-11 ASSESSMENT — PATIENT HEALTH QUESTIONNAIRE - PHQ9
2. FEELING DOWN, DEPRESSED OR HOPELESS: SEVERAL DAYS
1. LITTLE INTEREST OR PLEASURE IN DOING THINGS: NOT AT ALL
SUM OF ALL RESPONSES TO PHQ9 QUESTIONS 1 AND 2: 1
10. IF YOU CHECKED OFF ANY PROBLEMS, HOW DIFFICULT HAVE THESE PROBLEMS MADE IT FOR YOU TO DO YOUR WORK, TAKE CARE OF THINGS AT HOME, OR GET ALONG WITH OTHER PEOPLE: NOT DIFFICULT AT ALL

## 2024-12-11 NOTE — PROGRESS NOTES
Subjective   Francheska Ochoa is a 79 y.o. female PMH of HTN, HLD, T2DM, hx basal cell carcinoma, depression, and CKD who presents for Medicare Annual Wellness Visit Initial.    Medicare Wellness Billing Compliance Satisfied    *This is a visual tool to show completion of required items on the day of the visit. Green checks will only appear on the date of visit.    Review all medications by prescribing practitioner or clinical pharmacist (such as prescriptions, OTCs, herbal therapies and supplements) documented in the medical record    Past Medical, Surgical, and Family History reviewed and updated in chart    Tobacco Use Reviewed    Alcohol Use Reviewed    Illicit Drug Use Reviewed    PHQ2/9    Falls in Last Year Reviewed    Home Safety Risk Factors Reviewed    Cognitive Impairment Reviewed    Patient Self Assessment and Health Status    Current Diet Reviewed    Exercise Frequency    ADL - Hearing Impairment    ADL - Bathing    ADL - Dressing    ADL - Walks in Home    IADL - Managing Finances    IADL - Grocery Shopping    IADL - Taking Medications    IADL - Doing Housework        Patient was just seen for virtual visit, she states that she is feeling better overall but is still congested. She feels it is clearing up though. She does check her sugars at home, normally in the 90s and 100s. Also checks BP at home and normally 120s/60s.       Review of Systems   Constitutional:  Negative for activity change, fatigue and fever.   Respiratory:  Negative for cough, shortness of breath and wheezing.    Cardiovascular:  Negative for chest pain, palpitations and leg swelling.   Gastrointestinal:  Negative for abdominal distention, abdominal pain, diarrhea, nausea and vomiting.   Neurological:  Negative for dizziness, weakness and light-headedness.   Psychiatric/Behavioral:  Negative for agitation and confusion.        Objective   Physical Exam  Constitutional:       Appearance: Normal appearance.    Cardiovascular:      Rate and Rhythm: Normal rate and regular rhythm.      Heart sounds: Normal heart sounds. No murmur heard.     No friction rub. No gallop.   Pulmonary:      Effort: Pulmonary effort is normal.      Breath sounds: Normal breath sounds. No wheezing or rhonchi.   Abdominal:      General: Bowel sounds are normal. There is no distension.      Palpations: Abdomen is soft.      Tenderness: There is no abdominal tenderness.   Musculoskeletal:         General: No swelling.   Skin:     General: Skin is warm and dry.      Findings: No bruising or rash.   Neurological:      General: No focal deficit present.      Mental Status: She is alert and oriented to person, place, and time.   Psychiatric:         Mood and Affect: Mood normal.         Thought Content: Thought content normal.         Judgment: Judgment normal.         Assessment/Plan   Problem List Items Addressed This Visit       Essential hypertension     - /68 today in office   - Continue amlodipine          Hyperlipidemia     - Continue simvastatin          DM type 2 with diabetic mixed hyperlipidemia (Multi)     - Most recent HbA1c 5.2% Aug 2024   - Continue Mounjaro           Other Visit Diagnoses       Encounter for screening for malignant neoplasm of breast, unspecified screening modality    -  Primary    Relevant Orders    BI mammo bilateral screening tomosynthesis    Screening mammogram for breast cancer        Relevant Orders    BI mammo bilateral screening tomosynthesis               # Health maintenance   - Mammogram 1/26/2024 normal, ordered   - Colonoscopy: 10/5/2019 showed diverticulosis repeat in 5 years 10/2024 (scheduled for January 2024)   - Dexa: 12/2021 T score -0.2 > repeated 1/2024 showed osteopenia T -1.7 in forearm   - Yearly labs: UTD     RTC in 4 months

## 2024-12-13 ENCOUNTER — TELEPHONE (OUTPATIENT)
Dept: PRIMARY CARE | Facility: CLINIC | Age: 79
End: 2024-12-13
Payer: MEDICARE

## 2024-12-13 NOTE — TELEPHONE ENCOUNTER
Pt is calling about her  and getting him home care.    States you 2 were discussing it yesterday.     She has more questions

## 2024-12-26 DIAGNOSIS — E11.69 DM TYPE 2 WITH DIABETIC MIXED HYPERLIPIDEMIA (MULTI): ICD-10-CM

## 2024-12-26 DIAGNOSIS — E78.2 DM TYPE 2 WITH DIABETIC MIXED HYPERLIPIDEMIA (MULTI): ICD-10-CM

## 2024-12-26 RX ORDER — TIRZEPATIDE 5 MG/.5ML
INJECTION, SOLUTION SUBCUTANEOUS
Qty: 2 ML | Refills: 3 | Status: SHIPPED | OUTPATIENT
Start: 2024-12-26

## 2025-01-08 ENCOUNTER — APPOINTMENT (OUTPATIENT)
Dept: OPERATING ROOM | Facility: HOSPITAL | Age: 80
End: 2025-01-08
Payer: MEDICARE

## 2025-02-04 ENCOUNTER — APPOINTMENT (OUTPATIENT)
Dept: PODIATRY | Facility: CLINIC | Age: 80
End: 2025-02-04
Payer: MEDICARE

## 2025-02-04 DIAGNOSIS — E11.9 ENCOUNTER FOR DIABETIC FOOT EXAM (MULTI): Primary | ICD-10-CM

## 2025-02-04 DIAGNOSIS — E11.69 DM TYPE 2 WITH DIABETIC MIXED HYPERLIPIDEMIA (MULTI): ICD-10-CM

## 2025-02-04 DIAGNOSIS — E78.2 DM TYPE 2 WITH DIABETIC MIXED HYPERLIPIDEMIA (MULTI): ICD-10-CM

## 2025-02-04 PROCEDURE — 1036F TOBACCO NON-USER: CPT | Performed by: PODIATRIST

## 2025-02-04 PROCEDURE — 99203 OFFICE O/P NEW LOW 30 MIN: CPT | Performed by: PODIATRIST

## 2025-02-04 PROCEDURE — 1160F RVW MEDS BY RX/DR IN RCRD: CPT | Performed by: PODIATRIST

## 2025-02-04 PROCEDURE — 1159F MED LIST DOCD IN RCRD: CPT | Performed by: PODIATRIST

## 2025-02-04 NOTE — PROGRESS NOTES
History of Present Illness:   Patient states they are here for Dm exam  Denies NTB to feet  Most recent A1C is 5.2, Aug 2024  Referred by pcp for foot exam      Past Medical History  Past Medical History:   Diagnosis Date    Body mass index (BMI) 32.0-32.9, adult 05/17/2022    Body mass index (BMI) of 32.0 to 32.9 in adult    Body mass index (BMI) 33.0-33.9, adult 09/02/2022    BMI 33.0-33.9,adult    Body mass index (BMI) 34.0-34.9, adult 04/08/2022    Body mass index (BMI) of 34.0 to 34.9 in adult    Body mass index (BMI) 35.0-35.9, adult 11/12/2021    BMI 35.0-35.9,adult    Body mass index (BMI) 36.0-36.9, adult 08/13/2021    BMI 36.0-36.9,adult    Body mass index (BMI) 37.0-37.9, adult 04/15/2019    BMI 37.0-37.9, adult    Morbid (severe) obesity due to excess calories (Multi) 04/08/2022    Severe obesity (BMI 35.0-35.9 with comorbidity)    Personal history of other endocrine, nutritional and metabolic disease 02/11/2022    History of obesity       Medications and Allergies have been reviewed.    Review Of Systems:  GENERAL: No weight loss, malaise or fevers.  HEENT: Negative for frequent or significant headaches,   RESPIRATORY: Negative for cough, wheezing or shortness of breath.  CARDIOVASCULAR: Negative for chest pain, leg swelling or palpitations.    Examination of Both Lower Extremities:   Objective:   Vasc: DP and PT pulses are palpable bilateral.  CFT is less than 3 seconds bilateral.  Skin temperature is warm to cool proximal to distal bilateral.  Mild LE edema n oted    Neuro: Vibratory, light touch and proprioception are intact bilateral.      Derm: Nails 1-5 bilateral are intact.  Skin is supple with normal texture and turgor noted.  Webspaces are clean, dry and intact bilateral.  There are no hyperkeratoses, ulcerations, verruca or other lesions noted.      Ortho: Muscle strength is 5/5 for all pedal groups tested. Contracted 2nd digit  1. Encounter for diabetic foot exam (Multi)        2. DM type 2  with diabetic mixed hyperlipidemia (Multi)  Referral to Podiatry          Patient exam and eval  A1C revd. WNL  Low pedal risk noted  Fu yearly   Sooner if any new prob arise  Patient was in agreement to this plan. All questions answered.      Delia Sandoval DPM  [phone]  [facility]  Fax: [phone]

## 2025-02-05 ENCOUNTER — HOME VISIT (OUTPATIENT)
Dept: PRIMARY CARE | Facility: CLINIC | Age: 80
End: 2025-02-05
Payer: MEDICARE

## 2025-02-05 DIAGNOSIS — M19.90 ARTHRITIS: Primary | ICD-10-CM

## 2025-02-05 PROCEDURE — 99375 HOME HEALTH CARE SUPERVISION: CPT | Performed by: INTERNAL MEDICINE

## 2025-02-12 ENCOUNTER — OFFICE VISIT (OUTPATIENT)
Dept: PRIMARY CARE | Facility: CLINIC | Age: 80
End: 2025-02-12
Payer: MEDICARE

## 2025-02-12 VITALS
WEIGHT: 180 LBS | BODY MASS INDEX: 31.89 KG/M2 | DIASTOLIC BLOOD PRESSURE: 74 MMHG | HEART RATE: 54 BPM | SYSTOLIC BLOOD PRESSURE: 151 MMHG | HEIGHT: 63 IN

## 2025-02-12 DIAGNOSIS — I48.91 ATRIAL FIBRILLATION WITH RVR (MULTI): Primary | ICD-10-CM

## 2025-02-12 DIAGNOSIS — N17.9 AKI (ACUTE KIDNEY INJURY) (CMS-HCC): ICD-10-CM

## 2025-02-12 PROCEDURE — 99214 OFFICE O/P EST MOD 30 MIN: CPT | Performed by: INTERNAL MEDICINE

## 2025-02-12 PROCEDURE — 3078F DIAST BP <80 MM HG: CPT | Performed by: INTERNAL MEDICINE

## 2025-02-12 PROCEDURE — 3077F SYST BP >= 140 MM HG: CPT | Performed by: INTERNAL MEDICINE

## 2025-02-12 PROCEDURE — G2211 COMPLEX E/M VISIT ADD ON: HCPCS | Performed by: INTERNAL MEDICINE

## 2025-02-12 PROCEDURE — 1036F TOBACCO NON-USER: CPT | Performed by: INTERNAL MEDICINE

## 2025-02-12 ASSESSMENT — LIFESTYLE VARIABLES
HOW OFTEN DO YOU HAVE A DRINK CONTAINING ALCOHOL: NEVER
AUDIT-C TOTAL SCORE: 0
HOW MANY STANDARD DRINKS CONTAINING ALCOHOL DO YOU HAVE ON A TYPICAL DAY: PATIENT DOES NOT DRINK
SKIP TO QUESTIONS 9-10: 1
HOW OFTEN DO YOU HAVE SIX OR MORE DRINKS ON ONE OCCASION: NEVER

## 2025-02-12 ASSESSMENT — ENCOUNTER SYMPTOMS
LOSS OF SENSATION IN FEET: 0
DEPRESSION: 0
OCCASIONAL FEELINGS OF UNSTEADINESS: 0

## 2025-02-12 NOTE — PROGRESS NOTES
Subjective    Francheska Ochoa is a 79 y.o. female presenting for post-hospital follow up. Patient has a history of htn, hld, dm2 on GLP-1 agonist, and CKD3. She was hospitalized from 12/25 to 1/1 for chest pain found to be secondary to atrial fibrillation with RVR. While inpatient, treated with metoprolol, amiodarone, and cardioversion. Had stress test 1/26/2024 with no evidence of ischemia. Holter monitor 1/11/2024 showed no episodes of atrial fibrillation.     Objective    Physical Exam  Constitutional:       General: She is not in acute distress.     Appearance: Normal appearance. She is obese. She is not ill-appearing.   HENT:      Head: Normocephalic and atraumatic.      Nose: Nose normal.      Mouth/Throat:      Mouth: Mucous membranes are moist.      Pharynx: Oropharynx is clear.   Eyes:      Extraocular Movements: Extraocular movements intact.      Pupils: Pupils are equal, round, and reactive to light.   Cardiovascular:      Rate and Rhythm: Normal rate and regular rhythm.      Heart sounds: Normal heart sounds.   Pulmonary:      Effort: Pulmonary effort is normal.      Breath sounds: Normal breath sounds.   Abdominal:      General: Abdomen is flat.      Palpations: Abdomen is soft.   Musculoskeletal:         General: Normal range of motion.      Right lower leg: Edema present.      Left lower leg: Edema present.   Skin:     General: Skin is warm and dry.      Capillary Refill: Capillary refill takes less than 2 seconds.      Findings: Bruising and lesion present.   Neurological:      General: No focal deficit present.      Mental Status: She is alert and oriented to person, place, and time. Mental status is at baseline.     Heart Rate:  [54] 54  BP: (151)/(74) 151/74  Vitals:    02/12/25 1410   Weight: 81.6 kg (180 lb)     Labs:  CBC:  Recent Labs     08/20/24  1138 02/14/24  1356 02/16/23  1600   WBC 6.8 6.4 6.4   HGB 12.3 11.9* 11.6*   HCT 37.8 38.0 36.3    200 216   * 103* 97      CMP:  Recent Labs     08/20/24  1138 02/14/24  1356 09/19/23  1057    139 142   K 4.5 4.4 5.1    103 105   CO2 28 27 28   ANIONGAP 15 13 14   BUN 32* 38* 30*   CREATININE 1.36* 1.60* 1.49*   EGFR 39* 32*  --    GLUCOSE 101* 93 80     Recent Labs     08/20/24  1138 02/14/24  1356 09/19/23  1057 06/05/23  1249 03/30/22  1430 03/28/22  1801   ALBUMIN 3.9 3.8 4.1 4.1   < > 4.4   ALKPHOS 40  --  41 46   < > 54   ALT 11  --  11 25   < > 15   AST 15  --  14 28   < > 18   BILITOT 0.4  --  0.5 0.5   < > 0.4   LIPASE  --   --   --   --   --  43    < > = values in this interval not displayed.     Calcium/Phos:   Lab Results   Component Value Date    CALCIUM 9.6 08/20/2024    PHOS 4.2 08/20/2024      ENDO:  Recent Labs     08/20/24  1138 04/09/24  1041 09/19/23  1057 06/05/23  1249 03/06/23  0948 05/17/22  0952 12/14/21  0745 05/14/21  1055   TSH 1.35  --   --   --  1.89 2.09  --  2.48   HGBA1C 5.2 5.6 5.2 5.5 5.9* 5.5   < > 6.9    < > = values in this interval not displayed.      CARDIAC:   Recent Labs     08/20/24  1138 03/06/23  0948 05/17/22  0952 12/14/21  0745   CHOL 136 127 134 138   LDLF  --  63 65 69   LDLCALC 63  --   --   --    HDL 55.7 45.4 50.8 50.1   TRIG 88 94 89 94     Imaging:  Nuclear Stress Test  1/26/2024  IMPRESSION:  1. Negative myocardial perfusion study without evidence of inducible  myocardial ischemia or prior infarction.  2. The left ventricle is normal in size.  3. Normal LV wall motion with a post-stress LV EF estimated greater  than 65%.    Meds:  Current Outpatient Medications   Medication Instructions    amLODIPine (NORVASC) 10 mg, oral, Daily    Autolet lancing device 1 each, miscellaneous, 2 times daily, CHECK BLOOD SUGAR TWICE DAILY    blood sugar diagnostic (Accu-Chek Guide test strips) strip 1 strip, miscellaneous, 2 times daily, CHECK BLOOD SUGAR TWICE DAILY    cholecalciferol (VITAMIN D-3) 50 mcg, oral, Daily    citalopram (CELEXA) 20 mg, oral, Every Day    cranberry 400 mg  capsule 1 tablet, oral, Daily    diclofenac sodium (Voltaren) 1 % gel APPLY TO THE AFFECTED AREAS 2 GRAMS (2.25 INCHES) TOPICALLY TWICE DAILY AS NEEDED    fenofibrate (TRICOR) 145 mg, oral, Daily    ferrous gluconate (FERGON) 324 mg, oral, 2 times daily    lutein 6 mg capsule oral    metoprolol tartrate (LOPRESSOR) 50 mg, oral, 2 times daily    olmesartan (BENICAR) 40 mg, oral, Daily    oxybutynin XL (DITROPAN-XL) 10 mg, oral, Nightly    simvastatin (ZOCOR) 20 mg, oral, Every Day    tirzepatide (Mounjaro) 5 mg/0.5 mL pen injector INJECT 5MG (1 PEN) UNDER THE SKIN EVERY WEEK     Assessment    Francheska Ochoa is a 79 y.o. female pmhx HTN, HLD, DM2, CKD3, and new onset paroxysmal atrial fibrillation. S/p amio, metoprolol, cardioversion, discharged on metop, amio, and rivaroxaban. Incidental JAIME while inpatient.    # Paroxysmal atrial fibrillation with RVR  - Continue amiodarone 100mg, metoprolol 50 mg, rivaroxaban 15mg  - Refer to electrophysiology for ablation and possible left atrial appendage closure evaluation    # JAIME on CKD  - Suspect 2/2 atrial fibrillation, repeat CMP today    Reji Estrella MD MS  PGY2 Internal Medicine

## 2025-02-13 LAB
ALBUMIN SERPL-MCNC: 4.3 G/DL (ref 3.6–5.1)
ALP SERPL-CCNC: 57 U/L (ref 37–153)
ALT SERPL-CCNC: 16 U/L (ref 6–29)
ANION GAP SERPL CALCULATED.4IONS-SCNC: 7 MMOL/L (CALC) (ref 7–17)
AST SERPL-CCNC: 18 U/L (ref 10–35)
BILIRUB SERPL-MCNC: 0.5 MG/DL (ref 0.2–1.2)
BUN SERPL-MCNC: 43 MG/DL (ref 7–25)
CALCIUM SERPL-MCNC: 9.8 MG/DL (ref 8.6–10.4)
CHLORIDE SERPL-SCNC: 100 MMOL/L (ref 98–110)
CO2 SERPL-SCNC: 30 MMOL/L (ref 20–32)
CREAT SERPL-MCNC: 1.55 MG/DL (ref 0.6–0.95)
EGFRCR SERPLBLD CKD-EPI 2021: 33 ML/MIN/1.73M2
GLUCOSE SERPL-MCNC: 86 MG/DL (ref 65–99)
POTASSIUM SERPL-SCNC: 4.8 MMOL/L (ref 3.5–5.3)
PROT SERPL-MCNC: 6.9 G/DL (ref 6.1–8.1)
SODIUM SERPL-SCNC: 137 MMOL/L (ref 135–146)

## 2025-02-25 ENCOUNTER — OFFICE VISIT (OUTPATIENT)
Dept: CARDIOLOGY | Facility: CLINIC | Age: 80
End: 2025-02-25
Payer: MEDICARE

## 2025-02-25 VITALS
OXYGEN SATURATION: 97 % | HEART RATE: 61 BPM | DIASTOLIC BLOOD PRESSURE: 59 MMHG | WEIGHT: 179 LBS | HEIGHT: 64 IN | BODY MASS INDEX: 30.56 KG/M2 | SYSTOLIC BLOOD PRESSURE: 128 MMHG

## 2025-02-25 DIAGNOSIS — Z01.818 PREOP TESTING: ICD-10-CM

## 2025-02-25 DIAGNOSIS — I48.91 ATRIAL FIBRILLATION WITH RVR (MULTI): ICD-10-CM

## 2025-02-25 DIAGNOSIS — I48.0 PAROXYSMAL ATRIAL FIBRILLATION (MULTI): ICD-10-CM

## 2025-02-25 PROCEDURE — 93005 ELECTROCARDIOGRAM TRACING: CPT | Performed by: INTERNAL MEDICINE

## 2025-02-25 PROCEDURE — 1159F MED LIST DOCD IN RCRD: CPT | Performed by: INTERNAL MEDICINE

## 2025-02-25 PROCEDURE — 99214 OFFICE O/P EST MOD 30 MIN: CPT | Mod: 25 | Performed by: INTERNAL MEDICINE

## 2025-02-25 PROCEDURE — 3074F SYST BP LT 130 MM HG: CPT | Performed by: INTERNAL MEDICINE

## 2025-02-25 PROCEDURE — 3078F DIAST BP <80 MM HG: CPT | Performed by: INTERNAL MEDICINE

## 2025-02-25 PROCEDURE — 1126F AMNT PAIN NOTED NONE PRSNT: CPT | Performed by: INTERNAL MEDICINE

## 2025-02-25 PROCEDURE — 93010 ELECTROCARDIOGRAM REPORT: CPT | Performed by: INTERNAL MEDICINE

## 2025-02-25 PROCEDURE — 99204 OFFICE O/P NEW MOD 45 MIN: CPT | Performed by: INTERNAL MEDICINE

## 2025-02-25 PROCEDURE — 1036F TOBACCO NON-USER: CPT | Performed by: INTERNAL MEDICINE

## 2025-02-25 ASSESSMENT — COLUMBIA-SUICIDE SEVERITY RATING SCALE - C-SSRS
1. IN THE PAST MONTH, HAVE YOU WISHED YOU WERE DEAD OR WISHED YOU COULD GO TO SLEEP AND NOT WAKE UP?: NO
6. HAVE YOU EVER DONE ANYTHING, STARTED TO DO ANYTHING, OR PREPARED TO DO ANYTHING TO END YOUR LIFE?: NO
2. HAVE YOU ACTUALLY HAD ANY THOUGHTS OF KILLING YOURSELF?: NO

## 2025-02-25 ASSESSMENT — ENCOUNTER SYMPTOMS
DEPRESSION: 1
LOSS OF SENSATION IN FEET: 0
OCCASIONAL FEELINGS OF UNSTEADINESS: 0

## 2025-02-25 ASSESSMENT — PATIENT HEALTH QUESTIONNAIRE - PHQ9
SUM OF ALL RESPONSES TO PHQ9 QUESTIONS 1 AND 2: 0
2. FEELING DOWN, DEPRESSED OR HOPELESS: NOT AT ALL
1. LITTLE INTEREST OR PLEASURE IN DOING THINGS: NOT AT ALL

## 2025-02-25 ASSESSMENT — PAIN SCALES - GENERAL: PAINLEVEL_OUTOF10: 0-NO PAIN

## 2025-02-25 NOTE — PROGRESS NOTES
Referred by Shrtui Fregoso, * provider found for No chief complaint on file.       Francheska Ochoa is a 79 y.o. year old female patient with h/o HTN, HLD, DM2, CKD3, and new onset paroxysmal atrial fibrillation. The patient was admitted to the Hospital ([facility]) in December 2024 for chest discomfort and found to be in A Fib with RVR. She underwent successful PAM/DCC and was discharged from the Hospital on metoprolol and anticoagulation. Was referred to me for treatment options evaluation.     PMHx/PSHx: As above    FamHx: unremarkable     Allergies:  Allergies   Allergen Reactions    Codeine Other     UPSET STOMACH    Latex Rash     LATEX GLOVES        Review of Systems    Constitutional: not feeling tired.   Eyes: no eyesight problems.   ENT: no hearing loss and no nosebleeds.   Cardiovascular: no intermittent leg claudication and as noted in HPI.   Respiratory: no chronic cough and no shortness of breath.   Gastrointestinal: no change in bowel habits and no blood in stools.   Genitourinary: no urinary frequency and no hematuria.   Skin: no skin rashes.   Neurological: no seizures and no frequent falls.   Psychiatric: no depression and not suicidal.   All other systems have been reviewed and are negative for complaint.     Outpatient Medications:  Current Outpatient Medications   Medication Instructions    amLODIPine (NORVASC) 10 mg, oral, Daily    Autolet lancing device 1 each, miscellaneous, 2 times daily, CHECK BLOOD SUGAR TWICE DAILY    blood sugar diagnostic (Accu-Chek Guide test strips) strip 1 strip, miscellaneous, 2 times daily, CHECK BLOOD SUGAR TWICE DAILY    cholecalciferol (VITAMIN D-3) 50 mcg, oral, Daily    citalopram (CELEXA) 20 mg, oral, Every Day    cranberry 400 mg capsule 1 tablet, oral, Daily    diclofenac sodium (Voltaren) 1 % gel APPLY TO THE AFFECTED AREAS 2 GRAMS (2.25 INCHES) TOPICALLY TWICE DAILY AS NEEDED    fenofibrate (TRICOR) 145 mg, oral, Daily    ferrous gluconate (FERGON)  "324 mg, oral, 2 times daily    lutein 6 mg capsule oral    metoprolol tartrate (LOPRESSOR) 50 mg, oral, 2 times daily    olmesartan (BENICAR) 40 mg, oral, Daily    oxybutynin XL (DITROPAN-XL) 10 mg, oral, Nightly    simvastatin (ZOCOR) 20 mg, oral, Every Day    tirzepatide (Mounjaro) 5 mg/0.5 mL pen injector INJECT 5MG (1 PEN) UNDER THE SKIN EVERY WEEK         Last Recorded Vitals:      4/9/2024     9:56 AM 5/23/2024     8:48 AM 8/20/2024    10:43 AM 8/26/2024     1:34 PM 8/26/2024     1:36 PM 12/11/2024    11:19 AM 2/12/2025     2:10 PM   Vitals   Systolic 120  110 127 117 123 151   Diastolic 70  60 74 71 68 74   BP Location Left arm   Left arm Left arm Right arm Left arm   Heart Rate    58  54 54   Temp    36.7 °C (98 °F)      Resp    16      Height 1.626 m (5' 4\") 1.6 m (5' 3\")  1.6 m (5' 3\")  1.6 m (5' 3\") 1.6 m (5' 3\")   Weight (lb) 174 179 171 175  180 180   BMI 29.87 kg/m2 31.71 kg/m2 30.29 kg/m2 31 kg/m2  31.89 kg/m2 31.89 kg/m2   BSA (m2) 1.89 m2 1.9 m2 1.86 m2 1.88 m2  1.9 m2 1.9 m2   Visit Report Report Report Report Report Report Report Report    Visit Vitals  OB Status Postmenopausal   Smoking Status Never        Physical Exam:  Constitutional: alert and in no acute distress.   Eyes: no erythema, swelling or discharge from the eye .   Neck: neck is supple, symmetric, trachea midline, no masses  and no thyromegaly .   Pulmonary: no increased work of breathing or signs of respiratory distress  and lungs clear to auscultation.    Cardiovascular: carotid pulses 2+ bilaterally with no bruit , JVP was normal, no thrills , regular rhythm, normal S1 and S2, no murmurs , pedal pulses 2+ bilaterally  and no edema .   Abdomen: abdomen non-tender, no masses  and no hepatomegaly .   Skin: skin warm and dry, normal skin turgor .   Psychiatric judgment and insight is normal  and oriented to person, place and time .        Assessment/Plan   Problem List Items Addressed This Visit    None  Visit Diagnoses         Codes "    Paroxysmal atrial fibrillation (Multi)     I48.0    Relevant Orders    ECG 12 lead (Clinic Performed)    Atrial fibrillation with RVR (Multi)     I48.91            Francheska Ochoa is a 79 y.o. year old female patient with h/o HTN, HLD, DM2, CKD3, and new onset paroxysmal atrial fibrillation. The patient was admitted to the Hospital ([facility]) in December 2024 for chest discomfort and found to be in A Fib with RVR. She underwent successful PAM/DCC and was discharged from the Hospital on metoprolol and anticoagulation. Was referred to me for treatment options evaluation.   Her current ECG shows NSR with narrow QRS and HR of 54 bpm. Recent stress test confirms a preserved LVEF. She is a good candidate for A fib RFA. All the R/B/A of the procedure were discussed with the patient who expressed understanding and agrees to proceed.         Cesar Thibodeaux MD  Cardiac Electrophysiology      Thank you very much for allowing me to participate in the care of this pleasant patient. Please do not hesitate to contact me with any further questions or concerns regarding his care.    **Disclaimer: This note was dictated by speech recognition, and every effort has been made to prevent any error in transcription, however minor errors may be present**

## 2025-02-27 LAB
ATRIAL RATE: 54 BPM
P AXIS: 71 DEGREES
P OFFSET: 204 MS
P ONSET: 151 MS
PR INTERVAL: 140 MS
Q ONSET: 221 MS
QRS COUNT: 9 BEATS
QRS DURATION: 88 MS
QT INTERVAL: 446 MS
QTC CALCULATION(BAZETT): 422 MS
QTC FREDERICIA: 430 MS
R AXIS: 63 DEGREES
T AXIS: 62 DEGREES
T OFFSET: 444 MS
VENTRICULAR RATE: 54 BPM

## 2025-03-04 ENCOUNTER — APPOINTMENT (OUTPATIENT)
Dept: NEPHROLOGY | Facility: CLINIC | Age: 80
End: 2025-03-04
Payer: MEDICARE

## 2025-03-04 VITALS
BODY MASS INDEX: 31.14 KG/M2 | TEMPERATURE: 97.5 F | WEIGHT: 182.4 LBS | HEART RATE: 63 BPM | OXYGEN SATURATION: 95 % | DIASTOLIC BLOOD PRESSURE: 71 MMHG | SYSTOLIC BLOOD PRESSURE: 117 MMHG | HEIGHT: 64 IN

## 2025-03-04 DIAGNOSIS — N18.32 STAGE 3B CHRONIC KIDNEY DISEASE (MULTI): Primary | ICD-10-CM

## 2025-03-04 DIAGNOSIS — E11.69 DM TYPE 2 WITH DIABETIC MIXED HYPERLIPIDEMIA (MULTI): ICD-10-CM

## 2025-03-04 DIAGNOSIS — I10 ESSENTIAL HYPERTENSION: ICD-10-CM

## 2025-03-04 DIAGNOSIS — E78.2 DM TYPE 2 WITH DIABETIC MIXED HYPERLIPIDEMIA (MULTI): ICD-10-CM

## 2025-03-04 DIAGNOSIS — E78.00 PURE HYPERCHOLESTEROLEMIA: ICD-10-CM

## 2025-03-04 PROCEDURE — 99214 OFFICE O/P EST MOD 30 MIN: CPT | Performed by: INTERNAL MEDICINE

## 2025-03-04 PROCEDURE — 3078F DIAST BP <80 MM HG: CPT | Performed by: INTERNAL MEDICINE

## 2025-03-04 PROCEDURE — G2211 COMPLEX E/M VISIT ADD ON: HCPCS | Performed by: INTERNAL MEDICINE

## 2025-03-04 PROCEDURE — 3074F SYST BP LT 130 MM HG: CPT | Performed by: INTERNAL MEDICINE

## 2025-03-04 RX ORDER — AMIODARONE HYDROCHLORIDE 200 MG/1
TABLET ORAL
COMMUNITY
Start: 2025-01-17

## 2025-03-04 NOTE — PATIENT INSTRUCTIONS
Dear HANSA   It was nice seeing you in the nephrology clinic today     Today we discussed the following:     # Chronic kidney disease stage III-possibly related to atherosclerosis. Kidney function is stable 30-35%. We will continue to monitor closely     #Urine urgency and frequency-continue oxybutynin at bedtime     #Hypertension-blood pressures in good control-continue same medications.  Currently are not on water pill    # Iron deficiency anemia-continue oral iron.  Try to take it with vitamin C as possible to increase absorption    # Recent [id number] ablation in April     Follow-up in 6 months with another repeat blood work and urinalysis prior to next visit     Please call our office if you have any question  Thank you for coming to see me today     Maribel Upton MD, MS, SOO WARREN  Clinical  - [employer]  Nephrologist - [facility] - [facility]

## 2025-03-04 NOTE — PROGRESS NOTES
Subjective       Francheska Ochoa is a 79 y.o. female who has past medical history of type 2 diabetes well-controlled-hypertension, nephrolithiasis and chronic kidney disease here for follow up.    Since the last visit the pt. was seen in 12/24 and diagnosed with atrial fibrillation started on Amiodarone and Xarelto. Olmesartan was held at the time but since has restarted medication.  She reprots she is scheduled for an ablation in April. Latest GFR: 33 and Cr: 1.55.  Will follow up in 6 months.     Prior Notes     8/26/24  She received oxybutynin for urinary urgency during prior visit.The patient has not been requiring any lasix at this time and was able to lose weight from her prior visit. No complaints at this time. GFR improved to 39% from prior reading of 32% in February. Continues to have no albuminuria. No changes will be made at this time.    Prior Note 2/2024   Patient came today for follow-up.  She came alone.  No kidney related complaints or concerns.  Blood pressure is in target.  She follows low-salt diet.  She is adherent to medications.  She denies being on Lasix anymore.  Most recent blood work done in February 2024 was reviewed and showed stable serum creatinine and GFR.  Within normal extra lites.  She continues to be iron deficient-she is currently on oral iron.  No albuminuria per spot test ACR    Prior notes 8/2023     Last office visit February 2023. We discussed chronic kidney disease. We discussed conservative measures. We started oxybutynin for overactive bladder and urine urgency. Evelyne came alone today. No kidney related complaints or concerns. She ran out of oxybutynin and she experiences some urinary urgency. No leg swelling or shortness of breath. No recent kidney stones. She had blood work done in May 2023 (2 months ago) that showed slightly worsening serum creatinine 2.1 and GFR 23 from 25 prior. Today I discussed with her that the reason for her worsening kidney function is unclear  "at this time. We discussed possible kidney biopsy-we decided to defer at this time. Urinalysis bland with no albumin. A1c is in target 5.5.     Per prior notes           Patient came today alone. She reports 5-6 years ago she had a procedure that involved a nephrologist but she does not recall the whole reason. She does have a past medical history of kidney stones, with ED visits in March 2022. A CT scan confirmed that this stone was causing obstruction in the L kidney. She is no longer having symptoms at this time. She denies dysuria or hematuria. She does claim small urinary leakage and increased frequency/urgency. We will start Oxybutynin for the frequency/urgency. She denies taking NSAIDs for pain. She does not have a family history of kidney stones. She endorses eating healthy, with fruits and vegetables. We educated her on reducing salt in diet. We discussed 50-60 ounces of water daily. She is a former smoker but quit 35+ years ago. Bloodwork was obtained and reviewed from December. GFR is 35 and Cr 1.5. We will repeat bloodwork today to have the most up to date labs. Obtain a kidney US at your earliest connivence. Prior UA did not reveal protein leak but she was unable to leave a sample today. Past medical history of DM, HTN, and HLD. (HPI was taken and written by PA student Isaac Boss)       Objective   /71 (BP Location: Right arm, Patient Position: Standing, BP Cuff Size: Large adult)   Pulse 63   Temp 36.4 °C (97.5 °F)   Ht 1.626 m (5' 4\")   Wt 82.7 kg (182 lb 6.4 oz)   SpO2 95%   BMI 31.31 kg/m²   Wt Readings from Last 3 Encounters:   03/04/25 82.7 kg (182 lb 6.4 oz)   02/25/25 81.2 kg (179 lb)   02/12/25 81.6 kg (180 lb)       Physical Exam    General appearance: no distress awake and alert on room air, euvolemic on exam  Eyes: non-icteric  HEENT: atrumatic head, PEERLA, moist mucosa  Skin: no apparent rash  Heart: NSR, S1, S2 normal, no murmur or gallop  Lungs: Symmetrical expansion,CTA " "bilat no wheezing/crackles  Abdomen: soft, nt/nd, obese  Extremities: no edema bilat  Neuro: No FND,asterixis, no focal deficits noticed        Review of Systems     Constitutional: no fever, no chills, no recent weight gain and no recent weight loss.   Eyes: no blurred vision and no diplopia.   ENT: no hearing loss, no earache, no sore throat, no swollen glands in the neck and no nasal discharge.   Cardiovascular: no chest pain, no palpitations and no lower extremity edema.   Respiratory: no shortness of breath, no chronic cough and no shortness of breath during exertion.   Gastrointestinal: no abdominal pain, no constipation, no heartburn, no vomiting, no bloody stools and no change in bowel movements.   Genitourinary: no dysuria and no hematuria.   Musculoskeletal: no arthralgias and no myalgias.   Skin: no rashes and no skin lesions.   Neurological: no headaches and no dizziness.   Psychiatric: no confusion, no depression and no anxiety.   Endocrine: no heat intolerance, no cold intolerance, appetite not increased, no thyroid disorder, no increased urinary frequency and no dry skin.   Hematologic/Lymphatic: does not bleed easily and does not bruise easily.   All other systems have been reviewed and are negative for complaint.         Data Review                     Lab Results   Component Value Date    URICACID 6.3 02/14/2024           Lab Results   Component Value Date    HGBA1C 5.2 08/20/2024                 No lab exists for component: \"CR\", \"PHOSPHORUS\"          Albumin/Creatinine Ratio   Date Value Ref Range Status   08/20/2024 11.0 <30.0 ug/mg Creat Final   02/14/2024 16.7 <30.0 ug/mg Creat Final     Albumin/Creatine Ratio   Date Value Ref Range Status   03/06/2023 13.9 0.0 - 30.0 ug/mg crt Final            RFP  Recent Labs     02/12/25  1450 08/20/24  1138 02/14/24  1356 09/19/23  1057 06/05/23  1249 02/16/23  1600 12/02/22  1042    138 139 142 140 139 141   K 4.8 4.5 4.4 5.1 5.1 4.7 4.6    " 100 103 105 103 100 102   CO2 30 28 27 28 28 31 31   BUN 43* 32* 38* 30* 60* 53* 40*   CREATININE 1.55* 1.36* 1.60* 1.49* 2.10* 1.98* 1.51*   GLUCOSE 86 101* 93 80 92 99 115*   CALCIUM 9.8 9.6 9.3 9.8 9.9 9.7 10.0   PHOS  --  4.2 4.1  --   --  4.1  --    EGFR 33* 39* 32*  --   --   --   --    ANIONGAP 7 15 13 14 14 13 13        Urineanalysis  Recent Labs     02/14/24  1356 02/16/23  1600 12/19/22  1235 05/17/22  0952 03/30/22  1407 03/28/22  1830 05/14/21  1055 11/11/20  1100 05/26/20  0904 11/01/19  0928 01/15/19  0934   COLORU Yellow YELLOW LIGHT YELLOW YELLOW YELLOW STRAW YELLOW STRAW YELLOW YELLOW MARLA   APPEARANCEU Clear HAZY CLEAR CLEAR HAZY CLEAR CLEAR CLEAR CLEAR CLEAR HAZY   SPECGRAVU 1.011 1.010 1.020 1.009 1.011 1.010 1.025 1.025 1.016 1.016 1.018   JON 6.0 6.0 6.0 5.0 6.0 7.0 5.5 5.5 5.0 5.0 5.0   PROTUR NEGATIVE NEGATIVE NEGATIVE NEGATIVE 100(2+)* NEGATIVE 30 (1+)* NEGATIVE NEGATIVE NEGATIVE NEGATIVE   GLUCOSEU NEGATIVE NEGATIVE NEGATIVE NEGATIVE 50(1+)* 50(1+)* NEGATIVE NEGATIVE NEGATIVE NEGATIVE NEGATIVE   BLOODU NEGATIVE NEGATIVE NEGATIVE NEGATIVE MODERATE(2+)* MODERATE(2+)* NEGATIVE NEGATIVE NEGATIVE NEGATIVE NEGATIVE   KETONESU NEGATIVE NEGATIVE NEGATIVE NEGATIVE NEGATIVE NEGATIVE NEGATIVE NEGATIVE NEGATIVE NEGATIVE NEGATIVE   BILIRUBINU NEGATIVE NEGATIVE NEGATIVE NEGATIVE NEGATIVE NEGATIVE NEGATIVE NEGATIVE NEGATIVE NEGATIVE NEGATIVE   NITRITEU NEGATIVE NEGATIVE NEGATIVE NEGATIVE NEGATIVE NEGATIVE NEGATIVE NEGATIVE NEGATIVE NEGATIVE NEGATIVE   LEUKOCYTESU TRACE* LARGE(3+)* TRACE* MODERATE(2+)* MODERATE(2+)* SMALL(1+)* SMALL (1+)* TRACE* NEGATIVE NEGATIVE SMALL (1+)*       Urine Electrolytes  Recent Labs     08/20/24  1224 02/14/24  1356 03/06/23  0948 02/16/23  1600 12/19/22  1235 05/17/22  0952 03/30/22  1407 03/28/22  1830 12/14/21  0745 05/14/21  1055 11/11/20  1100 05/26/20  0904 11/01/19  0928 01/15/19  0934 07/10/18  0913 10/17/17  0930   CREATU 118.0 44.8  44.3 63.4 36.7  --  48.0  --    --  66.1 127.0 72.0 68.2 66.9 122.0 68.5 86.6   PROTUR  --  NEGATIVE  --  NEGATIVE NEGATIVE NEGATIVE 100(2+)* NEGATIVE  --  30 (1+)* NEGATIVE NEGATIVE NEGATIVE NEGATIVE  --   --    UTPCR  --  0.09  --   --   --   --   --   --   --   --   --   --   --   --   --   --    ALBUMINUR 13.0 7.5  --   --   --   --   --   --   --   --   --   --   --   --   --   --    MICROALBCREA 11.0 16.7 13.9 24.5  --  SEE COMMENT  --   --  18.6 132.0* 32.2* 32.4* SEE COMMENT 27.5 <7.3 17.0        Urine Micro  Recent Labs     02/14/24  1356 02/16/23  1600 12/19/22  1235 05/17/22  0952 03/30/22  1407 03/28/22  1830 05/14/21  1055 11/11/20  1100 01/15/19  0934   WBCU NONE 17* 1 0-5 43* 0-5 5-20* 0-5 18*   RBCU NONE 7* <1 0-5 10* 0-5 0-5 NONE 1   HYALCASTU  --   --   --   --   --   --  OCCASIONAL*  --  2+*   SQUAMEPIU  --  <1  --  FEW <1 1+  --  FEW 2   BACTERIAU  --   --   --   --  1+* 1+*  --   --  4+*   MUCUSU  --  FEW FEW  --  FEW 1+  --   --  1+        Iron  Recent Labs     08/20/24  1138 02/14/24  1356 02/16/23  1600 11/01/19  0927   IRON 72 56 58  --    TIBC 351 390 448*  --    IRONSAT 21* 14* 13*  --    FERRITIN 324* 181* 88 57          Current Outpatient Medications on File Prior to Visit   Medication Sig Dispense Refill    amiodarone (Pacerone) 200 mg tablet Take half a tablet daily.      amLODIPine (Norvasc) 10 mg tablet Take 1 tablet (10 mg) by mouth once daily. 90 tablet 3    Autolet lancing device 1 each 2 times a day. CHECK BLOOD SUGAR TWICE DAILY 100 each 1    blood sugar diagnostic (Accu-Chek Guide test strips) strip 1 strip 2 times a day. CHECK BLOOD SUGAR TWICE DAILY 100 strip 2    cholecalciferol (Vitamin D-3) 50 MCG (2000 UT) tablet Take 1 tablet (50 mcg) by mouth once daily. 90 tablet 1    citalopram (CeleXA) 20 mg tablet Take 1 tablet (20 mg) by mouth once every day. 90 tablet 1    cranberry 400 mg capsule Take 1 tablet by mouth once daily.      diclofenac sodium (Voltaren) 1 % gel APPLY TO THE AFFECTED AREAS 2 GRAMS  (2.25 INCHES) TOPICALLY TWICE DAILY AS NEEDED 200 g 0    fenofibrate (Tricor) 145 mg tablet Take 1 tablet (145 mg) by mouth once daily. 90 tablet 3    ferrous gluconate 324 (38 Fe) mg tablet Take 1 tablet (324 mg) by mouth 2 times a day. 180 tablet 2    lutein 6 mg capsule Take by mouth.      metoprolol tartrate (Lopressor) 50 mg tablet Take 1 tablet by mouth 2 times a day. 180 tablet 1    olmesartan (BENIcar) 40 mg tablet Take 1 tablet (40 mg) by mouth once daily. 90 tablet 1    oxybutynin XL (Ditropan-XL) 10 mg 24 hr tablet Take 1 tablet (10 mg) by mouth once daily at bedtime. 90 tablet 3    rivaroxaban (Xarelto) 15 mg tablet Take 1 tablet (15 mg) by mouth once daily in the evening. Take with meals. Take with food.      simvastatin (Zocor) 20 mg tablet Take 1 tablet (20 mg) by mouth once every day. 90 tablet 1    tirzepatide (Mounjaro) 5 mg/0.5 mL pen injector INJECT 5MG (1 PEN) UNDER THE SKIN EVERY WEEK 2 mL 3     No current facility-administered medications on file prior to visit.           Assessment and Plan        Ms. Ochoa is a 78-year-old female with past medical history of type 2 diabetes well-controlled-hypertension well-controlled, nephrolithiasis and chronic kidney disease was coming to see me today for follow-up     Impression  #Chronic kidney disease stage 3b -progressive possibly atherosclerotic cardiovascular disease  -Baseline serum creatinine 1.5-2, GFR 30-40 mL/min per 1.73 m²  - Most recent serum creatinine 1.55, GFR 33 in February 2024-stable from prior  -Within normal electrolytes with no significant acidosis  - Spot test albumin creatinine issues negative  - Continue RAAS inhibitors and GLP-1 RA (Mounjaro)     # Atrial Fibrillation   - Continue on Amiodarone and Xarelto.   - Pt reports she is scheduled for an ablation in April    #Hypertension-well-controlled.   - Continue Amlodipine 10 mg  - Continue Metoprolol tarate 50 mg BID  - Continue Olmesartan 40 mg     #Type 2  diabetes-well-controlled.   - Continue Mounjaro     #Nephrolithiasis  -Repeat kidney image/kidney ultrasound in February 2023 negative for nephrolithiasis or hydroureter/hydronephrosis  - No recent nephrolithiasis activity  - Consider metabolic work-up if needed     #Urine urgency and frequency-possible pelvic muscle weakness and overactive bladder.   - Continue Oxybutynin 10mg at bedtime     #[id number] normal PTH, vitamin D, phosphorus and calcium    #Anemia  - From prior visit 8/26/24: Hgb 11.9, iron 72, TIBC 351, % saturation 21, Ferritin 324  - No recent lab work completed     #CVS-low risk  -On statins, RAAS inhibitors and Mounjaro     Follow-up in 6 months with repeat blood work and urinalysis prior to next visit    RANJIT DominiqueS

## 2025-03-05 DIAGNOSIS — Z01.818 PREOP TESTING: ICD-10-CM

## 2025-03-06 DIAGNOSIS — E78.2 DM TYPE 2 WITH DIABETIC MIXED HYPERLIPIDEMIA (MULTI): ICD-10-CM

## 2025-03-06 DIAGNOSIS — E11.69 DM TYPE 2 WITH DIABETIC MIXED HYPERLIPIDEMIA (MULTI): ICD-10-CM

## 2025-03-06 RX ORDER — TIRZEPATIDE 5 MG/.5ML
INJECTION, SOLUTION SUBCUTANEOUS
Qty: 2 ML | Refills: 3 | Status: SHIPPED | OUTPATIENT
Start: 2025-03-06

## 2025-03-07 ENCOUNTER — TELEPHONE (OUTPATIENT)
Dept: PRIMARY CARE | Facility: CLINIC | Age: 80
End: 2025-03-07
Payer: MEDICARE

## 2025-03-11 DIAGNOSIS — Z01.818 PREOP TESTING: ICD-10-CM

## 2025-03-13 ENCOUNTER — APPOINTMENT (OUTPATIENT)
Dept: ORTHOPEDIC SURGERY | Facility: CLINIC | Age: 80
End: 2025-03-13
Payer: MEDICARE

## 2025-03-13 DIAGNOSIS — M25.562 CHRONIC PAIN OF LEFT KNEE: Primary | ICD-10-CM

## 2025-03-13 DIAGNOSIS — G89.29 CHRONIC PAIN OF LEFT KNEE: Primary | ICD-10-CM

## 2025-03-20 ENCOUNTER — HOSPITAL ENCOUNTER (OUTPATIENT)
Dept: RADIOLOGY | Facility: CLINIC | Age: 80
Discharge: HOME | End: 2025-03-20
Payer: MEDICARE

## 2025-03-20 ENCOUNTER — APPOINTMENT (OUTPATIENT)
Dept: ORTHOPEDIC SURGERY | Facility: CLINIC | Age: 80
End: 2025-03-20
Payer: MEDICARE

## 2025-03-20 VITALS — HEIGHT: 64 IN | WEIGHT: 180 LBS | BODY MASS INDEX: 30.73 KG/M2

## 2025-03-20 DIAGNOSIS — M25.562 CHRONIC PAIN OF LEFT KNEE: Primary | ICD-10-CM

## 2025-03-20 DIAGNOSIS — M25.562 CHRONIC PAIN OF LEFT KNEE: ICD-10-CM

## 2025-03-20 DIAGNOSIS — G89.29 CHRONIC PAIN OF LEFT KNEE: Primary | ICD-10-CM

## 2025-03-20 DIAGNOSIS — G89.29 CHRONIC PAIN OF LEFT KNEE: ICD-10-CM

## 2025-03-20 PROCEDURE — 1159F MED LIST DOCD IN RCRD: CPT | Performed by: STUDENT IN AN ORGANIZED HEALTH CARE EDUCATION/TRAINING PROGRAM

## 2025-03-20 PROCEDURE — 73564 X-RAY EXAM KNEE 4 OR MORE: CPT | Mod: LT

## 2025-03-20 PROCEDURE — 99214 OFFICE O/P EST MOD 30 MIN: CPT | Performed by: STUDENT IN AN ORGANIZED HEALTH CARE EDUCATION/TRAINING PROGRAM

## 2025-03-20 ASSESSMENT — PAIN SCALES - GENERAL: PAINLEVEL_OUTOF10: 5 - MODERATE PAIN

## 2025-03-20 ASSESSMENT — PAIN DESCRIPTION - DESCRIPTORS: DESCRIPTORS: ACHING

## 2025-03-20 ASSESSMENT — PAIN - FUNCTIONAL ASSESSMENT: PAIN_FUNCTIONAL_ASSESSMENT: 0-10

## 2025-03-20 NOTE — PROGRESS NOTES
"NIKKI/TKA Related Summary           L hip: N  L knee: N  R hip: N  R knee: N  Lumbar surgery or significant pathology  200?: 6 level discectomy and fusion extending to ileum (Dr. Pruitt at [facility]). Doing well.            CC/SUBJECTIVE/HPI            Fracnheska Ochoa is a 79 y.o. female following up regarding:    L hip: OA  - 5/23/2024: Provided referral for intra-articular CSI, which she received on 6/12/2024. I reviewed Dr. Banda's note from that appt. Since then, her pain has gradually returned.    L knee: OA  - 5/23/2024: CSI.  Since then, her pain has gradually returned..            HISTORIES            Reports no major changes in health, substance use, or baseline medications.            OBJECTIVE            Physical exam  Estimated body mass index is 31.31 kg/m² as calculated from the following:    Height as of 3/4/25: 1.626 m (5' 4\").    Weight as of 3/4/25: 82.7 kg (182 lb 6.4 oz).  Gen/psych: NAD, conversational, appropriate    Ambulation  Gait: antalgic  Assistive device: none  Spine: Unchanged from previous visit    Focused MSK exam: L  Hip  Trendelenberg test: neg  Skin/incision: normal in area of planned/possible incision (DA approach)   Tenderness: none  Pain with log roll: pos  Stinchfield: pos  ROM: within expected range but painful  Flexion: 100º  IR: 10º  ER: 40º  Abd: 30º  Knee  Thrust: neg  Skin/incision: normal in area of planned/possible incision (medial parapatellar approach)  Effusion: trace  Alignment: neutral  Alignment correctable: na  Knee tenderness: patellofemoral and lateral joint line  Pes or Gerdy's tenderness: neg  Crepitation: mild  Extension: passive flexion contracture 5-10° and active extension lag 0°  Flexion: 115°  Anterior drawer: stable  Posterior drawer: stable  Varus/Valgus in extension: stable  Varus/Valgus in flexion: stable  Referred pain to knee with hip 90° flexion and 45° ER/IR: neg  Neurovascular             Strength: 5/5 hip/knee/ankle flexion and " extension  Sensory (L2-S1): SILT throughout lower extremity  Edema/stasis: no pitting edema  Pulse: DP 1+, PT 1+    Imaging  3/20/2025 L knee radiographs ([facility], WB AP/lateral, WB AP flexion) on my read: Joint space narrowing (medial tibiofemoral mild, lateral tibiofemoral mild, patellofemoral mild) with sclerosis. Limb alignment neutral.           ASSESSMENT & PLAN           Patient verbalized understanding of below A&P. All questions answered.  L hip, knee DJD  Differential, General Information: Unchanged   Shared decision making   Hip: Has tried activity modification, weight loss, PT, home exercise, topical gel, OTC, intra-articular CSI. Today, elected for referral for another intra-articular CSI.    Knee: Has tried activity modification, weight loss, PT, home exercise, topical gel, OTC, CSI.  Today, elected for repeat CSI.  We did discuss that her knee pain may be referred from her hip, which is much worse radiographically.  However, if the intra-articular knee CSI is helping the knee pain, I am happy to continue with this conservative treatment..  Follow-up: As needed.  If she ultimately progresses to desiring surgery, she would be an acceptable arthroplasty candidate.  PMH, PSH, other considerations discussed  Anemia (Hgb 11.9 on 2/14/2024)  CKD (Cr 1.6 on 2/14/24) associated with increased complications risk.  Dual mobility would be consideration given lumbar fusion  Listed in EMR but not endorsed by pt or evident on exam: Diabetes (A1c 5.6 on 4/9/24), unstable balance  Occupation: High   Hobbies: Gardening, reading, needlepoint  PCP: Shruti Murphy MD

## 2025-03-26 ENCOUNTER — HOSPITAL ENCOUNTER (OUTPATIENT)
Dept: RADIOLOGY | Facility: EXTERNAL LOCATION | Age: 80
Discharge: HOME | End: 2025-03-26

## 2025-03-26 ENCOUNTER — APPOINTMENT (OUTPATIENT)
Dept: ORTHOPEDIC SURGERY | Facility: CLINIC | Age: 80
End: 2025-03-26
Payer: MEDICARE

## 2025-03-26 DIAGNOSIS — M25.552 LEFT HIP PAIN: ICD-10-CM

## 2025-03-26 DIAGNOSIS — M16.12 LOCALIZED OSTEOARTHROSIS OF LEFT HIP: Primary | ICD-10-CM

## 2025-03-26 PROCEDURE — 20611 DRAIN/INJ JOINT/BURSA W/US: CPT | Performed by: FAMILY MEDICINE

## 2025-03-26 PROCEDURE — 99214 OFFICE O/P EST MOD 30 MIN: CPT | Performed by: FAMILY MEDICINE

## 2025-03-26 RX ORDER — LIDOCAINE HYDROCHLORIDE 10 MG/ML
4 INJECTION, SOLUTION INFILTRATION; PERINEURAL
Status: COMPLETED | OUTPATIENT
Start: 2025-03-26 | End: 2025-03-26

## 2025-03-26 RX ORDER — TRIAMCINOLONE ACETONIDE 40 MG/ML
80 INJECTION, SUSPENSION INTRA-ARTICULAR; INTRAMUSCULAR
Status: COMPLETED | OUTPATIENT
Start: 2025-03-26 | End: 2025-03-26

## 2025-03-26 RX ADMIN — LIDOCAINE HYDROCHLORIDE 4 ML: 10 INJECTION, SOLUTION INFILTRATION; PERINEURAL at 12:18

## 2025-03-26 RX ADMIN — TRIAMCINOLONE ACETONIDE 80 MG: 40 INJECTION, SUSPENSION INTRA-ARTICULAR; INTRAMUSCULAR at 12:18

## 2025-03-26 ASSESSMENT — ENCOUNTER SYMPTOMS
DEPRESSION: 0
OCCASIONAL FEELINGS OF UNSTEADINESS: 0
LOSS OF SENSATION IN FEET: 0

## 2025-03-26 NOTE — PROGRESS NOTES
** Please excuse any errors in grammar or translation related to this dictation. Voice recognition software was utilized to prepare this document. **    Assessment & Plan:  Clinical presentation is most consistent with advanced left hip arthritis.  Discuss with patient the nature of this disease and how it can be progressive. Discuss how symptoms can wax and wane in severity.  Non-operative treatment options reviewed below.    - Maintaining a healthy weight: Every pound of bodyweight is about 4-5 pounds through the lower extremity. Additionally to be consider for joint replacement surgery in the future, BMI needs to be <40%.  - Exercise program to improve muscle strength for support.   - Activity modifications as needed to include use of cane/walker or braces.  - Can optimize Tylenol use up to 3 g daily  - Steroid injection.  She had 7 months of relief with last injection on 6/12/2024.  Performed ultrasound-guided left hip intra-articular corticosteroid injection today.  - Referral to arthroplastic surgeon for potential joint replacement. Can follow up with Dr. Roberson about this.   Informed patient that steroid injection can be repeated every 3 or more months as symptoms dictate. Follow-up as needed for ongoing management.  All questions answered and patient is agreeable to this plan.     Chief complaint:  Left knee pain    HPI:  3/26/2025: Patient returns today for follow-up of left hip pain due to osteoarthritis.  Last left intra-articular corticosteroid injection on 6/12/2024 provided her 7 months of relief.  She notes that pain has gradually returned.  She is using Tylenol once a week. She reported that she was hospitalized in December due to atrial fibrillation.  She noted prolonged time of being bedridden which seems to have contributed to her left hip and knee pain.  She recently saw Dr. Roberson on 3/20/2025 where she obtained a left knee corticosteroid injection.  She notes some relief with her knee pain  today.  She is interested in repeating left hip intra-articular corticosteroid injection today.    6/12/2024: 79 y/o F, hx of DM and lumbar surgery, presents with left hip pain.  This complaint has been ongoing for 6 weeks.  Symptoms started after doing PT.  Pain is most prominent at lateral hip radiating into groin.  To date, patient has tried a variety of treatments to include topical creams and otc nsaids with little sustained effect. Previously saw Dr. Roberson for this with last visit being 5/23/24.  Denies previous surgery to this site. Referred here to be evaluated for nonoperative management of left hip OA.    Patient Active Problem List   Diagnosis    Anemia    Arthritis    Bilateral leg edema    Chronic kidney disease    Cough    Essential hypertension    Fatigue    Hyperlipidemia    Lower back pain    Lung crackles    Nephrolithiasis    Obesity    Pneumonia    Right hip pain    Shortness of breath    Spinal stenosis    Unstable balance    Urinary tract infection    URTI (acute upper respiratory infection)    Vitamin D deficiency    DM type 2 with diabetic mixed hyperlipidemia (Multi)    Malignant hypertension    Diabetes mellitus due to underlying condition with stage 3a chronic kidney disease (Multi)    Localized osteoarthrosis of left hip    Acute pain of left shoulder    Upper respiratory tract infection    Stage 5 chronic kidney disease not on chronic dialysis (Multi)    Screening mammogram for breast cancer     No past surgical history on file.  Current Outpatient Medications on File Prior to Visit   Medication Sig Dispense Refill    amiodarone (Pacerone) 200 mg tablet Take half a tablet daily.      amLODIPine (Norvasc) 10 mg tablet Take 1 tablet (10 mg) by mouth once daily. 90 tablet 3    Autolet lancing device 1 each 2 times a day. CHECK BLOOD SUGAR TWICE DAILY 100 each 1    blood sugar diagnostic (Accu-Chek Guide test strips) strip 1 strip 2 times a day. CHECK BLOOD SUGAR TWICE DAILY 100 strip 2     cholecalciferol (Vitamin D-3) 50 MCG (2000 UT) tablet Take 1 tablet (50 mcg) by mouth once daily. 90 tablet 1    citalopram (CeleXA) 20 mg tablet Take 1 tablet (20 mg) by mouth once every day. 90 tablet 1    cranberry 400 mg capsule Take 1 tablet by mouth once daily.      diclofenac sodium (Voltaren) 1 % gel APPLY TO THE AFFECTED AREAS 2 GRAMS (2.25 INCHES) TOPICALLY TWICE DAILY AS NEEDED 200 g 0    fenofibrate (Tricor) 145 mg tablet Take 1 tablet (145 mg) by mouth once daily. 90 tablet 3    ferrous gluconate 324 (38 Fe) mg tablet Take 1 tablet (324 mg) by mouth 2 times a day. 180 tablet 2    lutein 6 mg capsule Take by mouth.      metoprolol tartrate (Lopressor) 50 mg tablet Take 1 tablet by mouth 2 times a day. 180 tablet 1    olmesartan (BENIcar) 40 mg tablet Take 1 tablet (40 mg) by mouth once daily. 90 tablet 1    oxybutynin XL (Ditropan-XL) 10 mg 24 hr tablet Take 1 tablet (10 mg) by mouth once daily at bedtime. 90 tablet 3    rivaroxaban (Xarelto) 15 mg tablet Take 1 tablet (15 mg) by mouth once daily in the evening. Take with meals. Take with food.      simvastatin (Zocor) 20 mg tablet Take 1 tablet (20 mg) by mouth once every day. 90 tablet 1    tirzepatide (Mounjaro) 5 mg/0.5 mL pen injector INJECT 5MG (1 PEN) UNDER THE SKIN EVERY WEEK 2 mL 3     No current facility-administered medications on file prior to visit.     Exam:  LEFT Hip Exam:  Antalgic gait  No warmth, erythema or ecchymosis overlying.  Active flexion >90 degrees. Limited IR limited to 10deg.  NTTP over greater trochanter, glute tendons  [5]/5 strength of hip flexion, abduction, & adduction  SILT  [ - ]Log roll pain, [ + ]FADIR pain, [ - ]JESSICA pain, [ + ]Stinchfield    General Exam:  Constitutional - NAD, AAO x 3, conversing appropriately.  HEENT- Normocephalic and atraumatic. EOMI, PERRLA, No scleral icterus. No facial deformities. Hearing grossly normal.  Lungs - Breathing non-labored with normal rate. No accessory muscle use.  CV -  Extremities warm and well-perfused, brisk capillary refill present.   Neuro - CN II-XII grossly intact.    Results:  X-rays left hip obtained 5/23/24: advanced bilateral hip degenerative change. Surgical hardware visible in lumbar spine and across SI joint.     Lab Results   Component Value Date    HGBA1C 5.2 08/20/2024    HGBA1C 5.6 04/09/2024    CREATININE 1.55 (H) 02/12/2025    EGFR 33 (L) 02/12/2025      Procedure:  Patient ID: Francheska Ochoa is a 79 y.o. female.    L Inj/Asp: L hip joint on 3/26/2025 12:18 PM  Indications: pain  Details: 22 G (spinal) needle, ultrasound-guided anterior approach  Medications: 4 mL lidocaine 10 mg/mL (1 %); 80 mg triamcinolone acetonide 40 mg/mL  Outcome: (Patient tolerated procedure well. She had skin irritation along the inguinal crease. Applied bacitracin over the skin irritation. )    Procedure risk factors to include increased pain, bleeding, infection, neurovascular injury, soft tissue injury, progressive cartilage loss, transient elevation of blood glucose and blood pressure, and adverse reaction to medication were discussed with the patient. Patient understands there is a moderate risk of morbidity from undergoing the procedure.  Procedure, treatment alternatives, risks and benefits explained, specific risks discussed. Consent was given by the patient. Immediately prior to procedure a time out was called to verify the correct patient, procedure, equipment, support staff and site/side marked as required.

## 2025-04-02 ENCOUNTER — APPOINTMENT (OUTPATIENT)
Dept: PRIMARY CARE | Facility: CLINIC | Age: 80
End: 2025-04-02
Payer: MEDICARE

## 2025-04-02 ENCOUNTER — LAB (OUTPATIENT)
Dept: LAB | Facility: HOSPITAL | Age: 80
End: 2025-04-02
Payer: MEDICARE

## 2025-04-02 VITALS
WEIGHT: 175 LBS | SYSTOLIC BLOOD PRESSURE: 130 MMHG | HEIGHT: 64 IN | HEART RATE: 50 BPM | DIASTOLIC BLOOD PRESSURE: 67 MMHG | BODY MASS INDEX: 29.88 KG/M2

## 2025-04-02 DIAGNOSIS — E78.2 DM TYPE 2 WITH DIABETIC MIXED HYPERLIPIDEMIA (MULTI): ICD-10-CM

## 2025-04-02 DIAGNOSIS — I48.0 PAROXYSMAL ATRIAL FIBRILLATION (MULTI): ICD-10-CM

## 2025-04-02 DIAGNOSIS — E78.5 HYPERLIPIDEMIA, UNSPECIFIED HYPERLIPIDEMIA TYPE: Primary | ICD-10-CM

## 2025-04-02 DIAGNOSIS — E11.69 DM TYPE 2 WITH DIABETIC MIXED HYPERLIPIDEMIA (MULTI): ICD-10-CM

## 2025-04-02 DIAGNOSIS — I10 ESSENTIAL HYPERTENSION: ICD-10-CM

## 2025-04-02 DIAGNOSIS — Z01.818 ENCOUNTER FOR OTHER PREPROCEDURAL EXAMINATION: Primary | ICD-10-CM

## 2025-04-02 LAB
ABO GROUP (TYPE) IN BLOOD: NORMAL
ANTIBODY SCREEN: NORMAL
RH FACTOR (ANTIGEN D): NORMAL

## 2025-04-02 PROCEDURE — G2211 COMPLEX E/M VISIT ADD ON: HCPCS | Performed by: INTERNAL MEDICINE

## 2025-04-02 PROCEDURE — 86900 BLOOD TYPING SEROLOGIC ABO: CPT

## 2025-04-02 PROCEDURE — 3075F SYST BP GE 130 - 139MM HG: CPT | Performed by: INTERNAL MEDICINE

## 2025-04-02 PROCEDURE — 1036F TOBACCO NON-USER: CPT | Performed by: INTERNAL MEDICINE

## 2025-04-02 PROCEDURE — 99213 OFFICE O/P EST LOW 20 MIN: CPT | Performed by: INTERNAL MEDICINE

## 2025-04-02 PROCEDURE — 86850 RBC ANTIBODY SCREEN: CPT

## 2025-04-02 PROCEDURE — 86901 BLOOD TYPING SEROLOGIC RH(D): CPT

## 2025-04-02 PROCEDURE — 3078F DIAST BP <80 MM HG: CPT | Performed by: INTERNAL MEDICINE

## 2025-04-02 ASSESSMENT — ENCOUNTER SYMPTOMS
FATIGUE: 0
PALPITATIONS: 0
VOMITING: 0
ACTIVITY CHANGE: 0
ABDOMINAL DISTENTION: 0
ABDOMINAL PAIN: 0
DIZZINESS: 0
NAUSEA: 0
AGITATION: 0
CONFUSION: 0
LIGHT-HEADEDNESS: 0
FEVER: 0
DIARRHEA: 0
WEAKNESS: 0

## 2025-04-02 ASSESSMENT — PATIENT HEALTH QUESTIONNAIRE - PHQ9
1. LITTLE INTEREST OR PLEASURE IN DOING THINGS: NOT AT ALL
SUM OF ALL RESPONSES TO PHQ9 QUESTIONS 1 AND 2: 0
2. FEELING DOWN, DEPRESSED OR HOPELESS: NOT AT ALL

## 2025-04-02 ASSESSMENT — LIFESTYLE VARIABLES
HOW OFTEN DO YOU HAVE SIX OR MORE DRINKS ON ONE OCCASION: NEVER
SKIP TO QUESTIONS 9-10: 1
HOW OFTEN DO YOU HAVE A DRINK CONTAINING ALCOHOL: NEVER
HOW MANY STANDARD DRINKS CONTAINING ALCOHOL DO YOU HAVE ON A TYPICAL DAY: PATIENT DOES NOT DRINK
AUDIT-C TOTAL SCORE: 0

## 2025-04-02 NOTE — PROGRESS NOTES
Subjective   Francheska Ochoa is a 79 y.o. female  with PMHx of HTN, HLD, DM2, CKD3, paroxysmal atrial fibrillation, and L hip osteoarthritis who presents for Follow-up    Patient states that she has been doing well overall with no concerns. She is scheduled for an ablation for her A. Fib on Monday. She has been checking BP at home and it is usually 120s/50s. Blood sugars normally 92-98 at home.           Review of Systems   Constitutional:  Negative for activity change, fatigue and fever.   Cardiovascular:  Negative for chest pain, palpitations and leg swelling.   Gastrointestinal:  Negative for abdominal distention, abdominal pain, diarrhea, nausea and vomiting.   Neurological:  Negative for dizziness, weakness and light-headedness.   Psychiatric/Behavioral:  Negative for agitation and confusion.        Objective   Physical Exam  Constitutional:       Appearance: Normal appearance.   Cardiovascular:      Rate and Rhythm: Normal rate and regular rhythm.      Heart sounds: Normal heart sounds. No murmur heard.     No friction rub. No gallop.   Pulmonary:      Effort: Pulmonary effort is normal.      Breath sounds: Normal breath sounds. No wheezing or rhonchi.   Abdominal:      General: Bowel sounds are normal. There is no distension.      Palpations: Abdomen is soft.      Tenderness: There is no abdominal tenderness.   Musculoskeletal:         General: No swelling.   Skin:     General: Skin is warm and dry.      Findings: No bruising or rash.   Neurological:      General: No focal deficit present.      Mental Status: She is alert and oriented to person, place, and time.   Psychiatric:         Mood and Affect: Mood normal.         Thought Content: Thought content normal.         Judgment: Judgment normal.         Assessment/Plan   Problem List Items Addressed This Visit       Essential hypertension     - BP well controlled   - Continue Olmesartan and Amlodipine          Hyperlipidemia - Primary    DM type 2 with  diabetic mixed hyperlipidemia (Multi)     - Last HbA1c 5.2% in 08/2024   - Check HbA1c at next visit   - Continue Mounjaro 5mg weekly          Paroxysmal atrial fibrillation (Multi)     - Scheduled for ablation on Monday 4/7   - Continue metop tartrate 50mg BID   - Xarelto 15mg daily                # Health Maintenance   - Mammogram no longer needed d/t age   - Colonoscopy ordered 08/2024 not yet scheduled   - Labs: UTD, A1c next visit     RTC in July 2025

## 2025-04-02 NOTE — ASSESSMENT & PLAN NOTE
- Scheduled for ablation on Monday 4/7   - Continue metop tartrate 50mg BID   - Xarelto 15mg daily

## 2025-04-02 NOTE — PROGRESS NOTES
I reviewed the resident/fellow's documentation and discussed the patient with the resident/fellow. I agree with the resident/fellow's medical decision making as documented in the note.  As the attending physician, I certify that I personally reviewed the patient's history and personally examined the patient to confirm the physical findings described above, and that I reviewed the relevant imaging studies and available reports. I also discussed the differential diagnosis and all of the proposed management plans with the patient and individuals accompanying the patient to this visit. They had the opportunity to ask questions about the proposed management plans and to have those questions answered.     Shruti Murphy MD               Hospitalist Medicine Progress Note   [facility]       Kayode Lopez is a 63 JOSÉ MIGUEL  Hyponatremia  -had JOSÉ MIGUEL wife and son    Phillip Guerra MD  Hospitalist Service  [facility]    ______________________________________________________________________    

## 2025-04-03 LAB
ANION GAP SERPL CALCULATED.4IONS-SCNC: 8 MMOL/L (CALC) (ref 7–17)
BUN SERPL-MCNC: 42 MG/DL (ref 7–25)
BUN/CREAT SERPL: 25 (CALC) (ref 6–22)
CALCIUM SERPL-MCNC: 9.4 MG/DL (ref 8.6–10.4)
CHLORIDE SERPL-SCNC: 104 MMOL/L (ref 98–110)
CO2 SERPL-SCNC: 26 MMOL/L (ref 20–32)
CREAT SERPL-MCNC: 1.71 MG/DL (ref 0.6–0.95)
EGFRCR SERPLBLD CKD-EPI 2021: 30 ML/MIN/1.73M2
ERYTHROCYTE [DISTWIDTH] IN BLOOD BY AUTOMATED COUNT: 12.8 % (ref 11–15)
GLUCOSE SERPL-MCNC: 88 MG/DL (ref 65–139)
HCT VFR BLD AUTO: 39.5 % (ref 35–45)
HGB BLD-MCNC: 12.9 G/DL (ref 11.7–15.5)
MCH RBC QN AUTO: 32.6 PG (ref 27–33)
MCHC RBC AUTO-ENTMCNC: 32.7 G/DL (ref 32–36)
MCV RBC AUTO: 99.7 FL (ref 80–100)
PLATELET # BLD AUTO: 227 THOUSAND/UL (ref 140–400)
PMV BLD REES-ECKER: 11 FL (ref 7.5–12.5)
POTASSIUM SERPL-SCNC: 5.6 MMOL/L (ref 3.5–5.3)
RBC # BLD AUTO: 3.96 MILLION/UL (ref 3.8–5.1)
SODIUM SERPL-SCNC: 138 MMOL/L (ref 135–146)
WBC # BLD AUTO: 7.8 THOUSAND/UL (ref 3.8–10.8)

## 2025-04-07 ENCOUNTER — ANESTHESIA (OUTPATIENT)
Dept: CARDIOLOGY | Facility: HOSPITAL | Age: 80
End: 2025-04-07
Payer: MEDICARE

## 2025-04-07 ENCOUNTER — ANESTHESIA EVENT (OUTPATIENT)
Dept: CARDIOLOGY | Facility: HOSPITAL | Age: 80
End: 2025-04-07
Payer: MEDICARE

## 2025-04-07 NOTE — ANESTHESIA PREPROCEDURE EVALUATION
Patient: Francheska Ochoa    Procedure Information       Date/Time: 04/07/25 0830    Procedure: Ablation A-Fib    Location: [facility] / [facility]    Providers: Cesar Thibodeaux MD            Relevant Problems   Cardiac   (+) DM type 2 with diabetic mixed hyperlipidemia (Multi)   (+) Essential hypertension   (+) Hyperlipidemia   (+) Malignant hypertension   (+) Paroxysmal atrial fibrillation (Multi)      Pulmonary   (+) Pneumonia      /Renal   (+) Nephrolithiasis   (+) Urinary tract infection      Endocrine   (+) Obesity      Hematology   (+) Anemia      Musculoskeletal   (+) Localized osteoarthrosis of left hip   (+) Spinal stenosis      ID   (+) Pneumonia   (+) URTI (acute upper respiratory infection)   (+) Upper respiratory tract infection   (+) Urinary tract infection       Clinical information reviewed:                    Past Medical History:   Diagnosis Date    Atrial fibrillation (Multi)     CKD (chronic kidney disease)     Diabetes mellitus (Multi)     HTN (hypertension)     Hyperlipidemia       Past Surgical History:   Procedure Laterality Date    CARDIOVERSION      CATARACT EXTRACTION W/  INTRAOCULAR LENS IMPLANT      CHOLECYSTECTOMY      COLONOSCOPY      ECTOPIC PREGNANCY SURGERY      LUMBAR SPINE SURGERY  2010    SKIN CANCER EXCISION      SPINAL FUSION  04/05/2011    [id number]    TUBAL LIGATION       Social History     Tobacco Use    Smoking status: Never     Passive exposure: Never    Smokeless tobacco: Never   Substance Use Topics    Alcohol use: Never    Drug use: Never      Current Outpatient Medications   Medication Instructions    amiodarone (Pacerone) 200 mg tablet Take half a tablet daily.    amLODIPine (NORVASC) 10 mg, oral, Daily    Autolet lancing device 1 each, miscellaneous, 2 times daily, CHECK BLOOD SUGAR TWICE DAILY    blood sugar diagnostic (Accu-Chek Guide test strips) strip 1 strip, miscellaneous, 2 times daily, CHECK BLOOD SUGAR TWICE DAILY    cholecalciferol  "(VITAMIN D-3) 50 mcg, oral, Daily    citalopram (CELEXA) 20 mg, oral, Every Day    cranberry 400 mg capsule 1 tablet, Daily    diclofenac sodium (Voltaren) 1 % gel APPLY TO THE AFFECTED AREAS 2 GRAMS (2.25 INCHES) TOPICALLY TWICE DAILY AS NEEDED    fenofibrate (TRICOR) 145 mg, oral, Daily    ferrous gluconate (FERGON) 324 mg, oral, 2 times daily    lutein 6 mg capsule Take by mouth.    metoprolol tartrate (LOPRESSOR) 50 mg, oral, 2 times daily    olmesartan (BENICAR) 40 mg, oral, Daily    oxybutynin XL (DITROPAN-XL) 10 mg, oral, Nightly    rivaroxaban (XARELTO) 15 mg, Daily with evening meal    simvastatin (ZOCOR) 20 mg, oral, Every Day    tirzepatide (Mounjaro) 5 mg/0.5 mL pen injector INJECT 5MG (1 PEN) UNDER THE SKIN EVERY WEEK      Allergies   Allergen Reactions    Codeine Other     UPSET STOMACH    Latex Rash     LATEX GLOVES        Chemistry    Lab Results   Component Value Date/Time     04/02/2025 1301    K 5.6 (H) 04/02/2025 1301     04/02/2025 1301    CO2 26 04/02/2025 1301    BUN 42 (H) 04/02/2025 1301    CREATININE 1.71 (H) 04/02/2025 1301    Lab Results   Component Value Date/Time    CALCIUM 9.4 04/02/2025 1301    ALKPHOS 57 02/12/2025 1450    AST 18 02/12/2025 1450    ALT 16 02/12/2025 1450    BILITOT 0.5 02/12/2025 1450          Lab Results   Component Value Date    HGBA1C 5.2 08/20/2024     Lab Results   Component Value Date/Time    WBC 7.8 04/02/2025 1301    HGB 12.9 04/02/2025 1301    HCT 39.5 04/02/2025 1301     04/02/2025 1301     No results found for: \"PROTIME\", \"PTT\", \"INR\"  No results found for: \"ABORH\"  Encounter Date: 02/25/25   ECG 12 lead (Clinic Performed)   Result Value    Ventricular Rate 54    Atrial Rate 54    CA Interval 140    QRS Duration 88    QT Interval 446    QTC Calculation(Bazett) 422    P Axis 71    R Axis 63    T Axis 62    QRS Count 9    Q Onset 221    P Onset 151    P Offset 204    T Offset 444    QTC Fredericia 430    Narrative    Sinus " bradycardia  Septal infarct (cited on or before 30-MAR-2022)  Abnormal ECG  When compared with ECG of 30-MAR-2022 14:46,  No significant change was found  Confirmed by Cesar Thibodeaux ([id number]) on 2/27/2025 8:29:34 AM     No results found for this or any previous visit from the past 1095 days.    Echo 12/30/2024@[facility]:   CONCLUSIONS:   - Exam indication: Pre Cardioversion, Pre AF Ablation   - The left ventricle is normal in size. Left ventricular systolic function is   normal. EF = 65 ± 5% (visual est.) Left ventricular diastolic function was not   evaluated.   - The right ventricle is normal in size. Right ventricular systolic function is   normal.   - The left atrial cavity is mildly dilated.   - There are no significant valvular abnormalities.   - The visualized aorta is borderline dilated.   - No LUZMARIA thrombus.   - The patient has not had a prior [facility] echocardiographic exam for comparison.   Successful cardioversion using 200 Joules.     LEFT VENTRICLE   The left ventricle is normal in size.   Left ventricular systolic function is normal.   Left ventricular diastolic function was not evaluated.     RIGHT VENTRICLE   The right ventricle is normal in size.   Right ventricular systolic function is normal.     LEFT ATRIUM   The left atrial cavity is mildly dilated. There is no left atrial appendage   thrombus.     RIGHT ATRIUM   The right atrial cavity is normal in size.     MITRAL VALVE   The mitral valve leaflets are structurally normal. There is trace (trace - 1+)   mitral valve regurgitation.     TRICUSPID VALVE   The tricuspid valve leaflets are structurally normal. There is trace tricuspid   valve regurgitation.     AORTIC VALVE   The aortic valve cusps are structurally normal. There is no aortic valve stenosis.    There is no aortic valve regurgitation. Tricuspid aortic valve. There is mild   calcification.     PULMONIC VALVE   The pulmonic valve cusps are structurally normal. There is trace pulmonic valve    regurgitation.     AORTA   The visualized aorta is borderline dilated.     INTERATRIAL SEPTUM   There is no evidence of intracardiac shunting as detected by Doppler.     PERICARDIUM   There is no pericardial effusion.     Nuclear stress 1/26/2024:  IMPRESSION:  1. Negative myocardial perfusion study without evidence of inducible  myocardial ischemia or prior infarction.  2. The left ventricle is normal in size.  3. Normal LV wall motion with a post-stress LV EF estimated greater  than 65%.    Visit Vitals  OB Status Postmenopausal   Smoking Status Never     No data recorded    PHYSICAL EXAM    Anesthesia Plan

## 2025-04-14 DIAGNOSIS — Z01.818 PREOP TESTING: ICD-10-CM

## 2025-04-28 DIAGNOSIS — Z01.818 PREOP TESTING: ICD-10-CM

## 2025-05-15 ENCOUNTER — LAB (OUTPATIENT)
Dept: LAB | Facility: HOSPITAL | Age: 80
End: 2025-05-15
Payer: MEDICARE

## 2025-05-15 DIAGNOSIS — E11.69 DM TYPE 2 WITH DIABETIC MIXED HYPERLIPIDEMIA (MULTI): ICD-10-CM

## 2025-05-15 DIAGNOSIS — E78.2 DM TYPE 2 WITH DIABETIC MIXED HYPERLIPIDEMIA (MULTI): ICD-10-CM

## 2025-05-15 PROCEDURE — 86900 BLOOD TYPING SEROLOGIC ABO: CPT

## 2025-05-15 PROCEDURE — 86850 RBC ANTIBODY SCREEN: CPT

## 2025-05-15 PROCEDURE — 86901 BLOOD TYPING SEROLOGIC RH(D): CPT

## 2025-05-15 RX ORDER — TIRZEPATIDE 5 MG/.5ML
INJECTION, SOLUTION SUBCUTANEOUS
Qty: 2 ML | Refills: 3 | Status: SHIPPED | OUTPATIENT
Start: 2025-05-15

## 2025-05-16 LAB
ABO GROUP (TYPE) IN BLOOD: NORMAL
ANION GAP SERPL CALCULATED.4IONS-SCNC: 9 MMOL/L (CALC) (ref 7–17)
ANTIBODY SCREEN: NORMAL
BUN SERPL-MCNC: 46 MG/DL (ref 7–25)
BUN/CREAT SERPL: 28 (CALC) (ref 6–22)
CALCIUM SERPL-MCNC: 9.3 MG/DL (ref 8.6–10.4)
CHLORIDE SERPL-SCNC: 102 MMOL/L (ref 98–110)
CO2 SERPL-SCNC: 27 MMOL/L (ref 20–32)
CREAT SERPL-MCNC: 1.63 MG/DL (ref 0.6–0.95)
EGFRCR SERPLBLD CKD-EPI 2021: 31 ML/MIN/1.73M2
ERYTHROCYTE [DISTWIDTH] IN BLOOD BY AUTOMATED COUNT: 14.7 % (ref 11–15)
GLUCOSE SERPL-MCNC: 103 MG/DL (ref 65–139)
HCT VFR BLD AUTO: 39 % (ref 35–45)
HGB BLD-MCNC: 12.5 G/DL (ref 11.7–15.5)
MCH RBC QN AUTO: 33.1 PG (ref 27–33)
MCHC RBC AUTO-ENTMCNC: 32.1 G/DL (ref 32–36)
MCV RBC AUTO: 103.2 FL (ref 80–100)
PLATELET # BLD AUTO: 225 THOUSAND/UL (ref 140–400)
PMV BLD REES-ECKER: 10.5 FL (ref 7.5–12.5)
POTASSIUM SERPL-SCNC: 5.1 MMOL/L (ref 3.5–5.3)
RBC # BLD AUTO: 3.78 MILLION/UL (ref 3.8–5.1)
RH FACTOR (ANTIGEN D): NORMAL
SODIUM SERPL-SCNC: 138 MMOL/L (ref 135–146)
WBC # BLD AUTO: 5.6 THOUSAND/UL (ref 3.8–10.8)

## 2025-05-20 PROBLEM — Z79.01 ANTICOAGULANT LONG-TERM USE: Status: ACTIVE | Noted: 2025-05-20

## 2025-05-20 NOTE — ANESTHESIA PREPROCEDURE EVALUATION
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)   (+) Essential hypertension   (+) Hyperlipidemia   (+) Malignant hypertension   (+) Paroxysmal atrial fibrillation (Multi)      Pulmonary   (+) Pneumonia      /Renal   (+) Chronic renal insufficiency   (+) Nephrolithiasis   (+) Urinary tract infection      Endocrine   (+) Obesity      Hematology   (+) Anemia   (+) Anticoagulant long-term use      Musculoskeletal   (+) Localized osteoarthrosis of left hip   (+) Spinal stenosis      ID   (+) Pneumonia   (+) URTI (acute upper respiratory infection)   (+) Upper respiratory tract infection   (+) Urinary tract infection       Clinical information reviewed:                   NPO Detail:  No data recorded     Physical Exam    Airway  Mallampati: II  TM distance: >3 FB  Neck ROM: full  Mouth opening: 3 or more finger widths     Cardiovascular   Rhythm: irregular  Rate: abnormal     Dental    Pulmonary - normal exam   Abdominal                                                              Pre-Anesthesia Evaluation      Francheska Ochoa is a 79 y.o. female who presents for the above mentioned procedure due to Paroxysmal atrial fibrillation (Multi) [I48.0]       Medical History[1]  Surgical History[2]  Social History[3]  RX Allergies[4]  Current Medications[5]  Prior to Admission medications    Medication Sig Start Date End Date Taking? Authorizing Provider   amiodarone (Pacerone) 200 mg tablet Take half a tablet daily. 1/17/25   Historical Provider, MD   amLODIPine (Norvasc) 10 mg  tablet Take 1 tablet (10 mg) by mouth once daily. 12/6/24   Whitney Coker DO   Autolet lancing device 1 each 2 times a day. CHECK BLOOD SUGAR TWICE DAILY 12/19/23   Gama Florentino MD   blood sugar diagnostic (Accu-Chek Guide test strips) strip 1 strip 2 times a day. CHECK BLOOD SUGAR TWICE DAILY 4/9/24   Gama Florentino MD   cholecalciferol (Vitamin D-3) 50 MCG (2000 UT) tablet Take 1 tablet (50 mcg) by mouth once daily. 12/6/24   Whitney Coker DO   citalopram (CeleXA) 20 mg tablet Take 1 tablet (20 mg) by mouth once every day. 12/6/24   Whitney Coker DO   cranberry 400 mg capsule Take 1 tablet by mouth once daily.    Historical Provider, MD   diclofenac sodium (Voltaren) 1 % gel APPLY TO THE AFFECTED AREAS 2 GRAMS (2.25 INCHES) TOPICALLY TWICE DAILY AS NEEDED 10/9/24   Shruti Murphy MD   fenofibrate (Tricor) 145 mg tablet Take 1 tablet (145 mg) by mouth once daily. 12/6/24   Whitney Coker DO   ferrous gluconate 324 (38 Fe) mg tablet Take 1 tablet (324 mg) by mouth 2 times a day. 12/6/24 12/6/25  Whitney Coker DO   lutein 6 mg capsule Take by mouth.    Historical Provider, MD   metoprolol tartrate (Lopressor) 50 mg tablet Take 1 tablet by mouth 2 times a day. 12/6/24 6/4/25  Whitney Coker DO   olmesartan (BENIcar) 40 mg tablet Take 1 tablet (40 mg) by mouth once daily. 12/6/24   Whitney Coker DO   oxybutynin XL (Ditropan-XL) 10 mg 24 hr tablet Take 1 tablet (10 mg) by mouth once daily at bedtime. 12/6/24   Whitney Coker DO   rivaroxaban (Xarelto) 15 mg tablet Take 1 tablet (15 mg) by mouth once daily in the evening. Take with meals. Take with food.    Historical Provider, MD   simvastatin (Zocor) 20 mg tablet Take 1 tablet (20 mg) by mouth once every day. 12/6/24   Whitney Coker DO   tirzepatide (Mounjaro) 5 mg/0.5 mL pen injector INJECT 5MG (1 PEN) UNDER THE SKIN EVERY WEEK 5/15/25   Shruti Murphy MD   tirzepatide (Mounjaro) 5 mg/0.5 mL pen injector INJECT 5MG (1 PEN) UNDER THE SKIN EVERY WEEK 3/6/25 5/15/25   "Shruti Willson MD     No medication comments found.   Visit Vitals  OB Status Postmenopausal   Smoking Status Never     Lab Results   Component Value Date    WBC 5.6 05/15/2025    HGB 12.5 05/15/2025    HCT 39.0 05/15/2025     05/15/2025    ABO O 05/15/2025     Lab Results   Component Value Date    TSH 1.35 08/20/2024    HGBA1C 5.2 08/20/2024    GLUCOSE 103 05/15/2025     05/15/2025    K 5.1 05/15/2025     05/15/2025    CREATININE 1.63 (H) 05/15/2025    BUN 46 (H) 05/15/2025    EGFR 31 (L) 05/15/2025    CO2 27 05/15/2025    AST 18 02/12/2025    ALT 16 02/12/2025     No results found for: \"STAPHMRSASCR\"  Encounter Date: 02/25/25   ECG 12 lead (Clinic Performed)   Result Value    Ventricular Rate 54    Atrial Rate 54    VT Interval 140    QRS Duration 88    QT Interval 446    QTC Calculation(Bazett) 422    P Axis 71    R Axis 63    T Axis 62    QRS Count 9    Q Onset 221    P Onset 151    P Offset 204    T Offset 444    QTC Fredericia 430    Narrative    Sinus bradycardia  Septal infarct (cited on or before 30-MAR-2022)  Abnormal ECG  When compared with ECG of 30-MAR-2022 14:46,  No significant change was found  Confirmed by Cesar Thibodeaux ([id number]) on 2/27/2025 8:29:34 AM     Echocardiogram     [facility], [address] [place] [address]  Tel [phone] and Fax [phone]    TRANSTHORACIC ECHOCARDIOGRAM REPORT      Patient Name:     HANSA Cuba Physician:   [id number] Rafa Rivera MD  Study Date:       6/8/2021       Referring Physician: SHRUTI WILLSON  MRN/PID:          [id number]       PCP:  Accession/Order#: [id number]   Department Location: [facility]  YOB: 1945      Fellow:  Gender:           F              Nurse:  Admit Date:                      Sonographer:         Elida Rodriguez  Admission Status: Outpatient     Additional Staff:  Height:           160.02 cm      CC Report to:  Weight:           90.72 kg       " Study Type:          Echocardiogram  BSA:              1.93 m2  Blood Pressure: 137 /63 mmHg    Diagnosis/ICD: R60.0-Localized edema  Indication:    Bilateral leg edema  Procedure/CPT: Echo Complete w Full [id number]    Patient History:  Smoker:            Former.  Diabetes:          Yes  Pertinent History: LE Edema, HTN, Hyperlipidemia and Dyspnea.    Study Detail: The following Echo studies were performed: 2D, M-Mode, Doppler and  color flow. Technically challenging study due to body habitus. The  patient was awake.      PHYSICIAN INTERPRETATION:  Left Ventricle: The left ventricular systolic function is normal, with an estimated ejection fraction of 55-60%. There are no regional wall motion abnormalities. The left ventricular cavity size is normal. Spectral Doppler shows a normal pattern of left ventricular diastolic filling.  Left Atrium: The left atrium is normal in size.  Right Ventricle: The right ventricle is normal in size. There is normal right ventricular global systolic function.  Right Atrium: The right atrium is normal in size.  Aortic Valve: The aortic valve is trileaflet. There is no evidence of aortic valve stenosis.  There is no evidence of aortic valve regurgitation. The peak instantaneous gradient of the aortic valve is 8.9 mmHg. The mean gradient of the aortic valve is 5.0 mmHg.  Mitral Valve: The mitral valve is normal in structure. There is mild mitral valve regurgitation.  Tricuspid Valve: The tricuspid valve is structurally normal. There is mild tricuspid regurgitation.  Pulmonic Valve: The pulmonic valve is structurally normal. There is trace pulmonic valve regurgitation.  Pericardium: There is no pericardial effusion noted.  Aorta: The aortic root is normal.  Pulmonary Artery: The tricuspid regurgitant velocity is 2.66 m/s, and with an estimated right atrial pressure of 10 mmHg, the estimated pulmonary artery pressure is mildly elevated with the RVSP at 38.3 mmHg.  Systemic Veins: The  inferior vena cava appears to be of normal size.      CONCLUSIONS:  1. The left ventricular systolic function is normal with a 55-60% estimated ejection fraction.  2. There is mild mitral and tricuspid regurgitation.  3. The estimated pulmonary artery pressure is mildly elevated with the RVSP at 38.3 mmHg.    QUANTITATIVE DATA SUMMARY:  2D MEASUREMENTS:  Normal Ranges:  Ao Root d:     2.80 cm   (2.0-3.7cm)  IVSd:          0.82 cm   (0.6-1.1cm)  LVPWd:         1.21 cm   (0.6-1.1cm)  LVIDd:         4.77 cm   (3.9-5.9cm)  LVIDs:         3.08 cm  LV Mass Index: 89.0 g/m2  LV % FS        35.4 %    LA VOLUME:  Normal Ranges:  LA Vol A4C:        37.8 ml    (22+/-6mL/m2)  LA Vol A2C:        48.7 ml  LA Vol BP:         44.5 ml  LA Vol Index A4C:  19.5ml/m2  LA Vol Index A2C:  25.2 ml/m2  LA Vol Index BP:   23.0 ml/m2  LA Area A4C:       15.1 cm2  LA Area A2C:       17.8 cm2  LA Major Axis A4C: 5.1 cm  LA Major Axis A2C: 5.5 cm  LA Volume Index:   21.9 ml/m2  LA Vol A4C:        35.1 ml  LA Vol A2C:        47.0 ml    RA VOLUME BY A/L METHOD:  Normal Ranges:  RA Area A4C: 15.0 cm2    M-MODE MEASUREMENTS:  Normal Ranges:  Ao Root: 3.20 cm (2.0-3.7cm)  LAs:     3.70 cm (2.7-4.0cm)    AORTA MEASUREMENTS:  Normal Ranges:  Ao Sinus, d: 2.80 cm (2.1-3.5cm)  Asc Ao, d:   3.00 cm (2.1-3.4cm)    LV SYSTOLIC FUNCTION BY 2D PLANIMETRY (MOD):  Normal Ranges:  EF-A4C View: 62.9 % (>55%)  EF-A2C View: 56.7 %  EF-Biplane:  60.3 %    LV DIASTOLIC FUNCTION:  Normal Ranges:  MV Peak E:        0.92 m/s    (0.7-1.2 m/s)  MV Peak A:        0.90 m/s    (0.42-0.7 m/s)  E/A Ratio:        1.03        (1.0-2.2)  MV e'             0.11 m/s    (>8.0)  MV lateral e'     0.11 m/s  MV medial e'      0.08 m/s  MV A Dur:         140.00 msec  E/e' Ratio:       8.41        (<8.0)  PulmV Sys Glen:    74.80 cm/s  PulmV Ely Glen:   55.20 cm/s  PulmV S/D Glen:    1.40  PulmV A Revs Glen: 29.90 cm/s  PulmV A Revs Dur: 92.00 msec    MITRAL VALVE:  Normal Ranges:  MV DT:  "211 msec (150-240msec)    AORTIC VALVE:  Normal Ranges:  AoV Vmax:                1.49 m/s (<1.7m/s)  AoV Peak P.9 mmHg (<20mmHg)  AoV Mean P.0 mmHg (1.7-11.5mmHg)  LVOT Max Glen:            1.19 m/s (<1.1m/s)  AoV VTI:                 31.10 cm (18-25cm)  LVOT VTI:                28.10 cm  LVOT Diameter:           2.10 cm  (1.8-2.4cm)  AoV Area, VTI:           3.13 cm2 (2.5-5.5cm2)  AoV Area,Vmax:           2.77 cm2 (2.5-4.5cm2)  AoV Dimensionless Index: 0.90    RIGHT VENTRICLE:  RV 1   3.97 cm  RV 2   2.98 cm  RV 3   7.83 cm  TAPSE: 24.4 mm  RV s'  0.13 m/s    TRICUSPID VALVE/RVSP:  Normal Ranges:  Peak TR Velocity: 2.66 m/s  RV Syst Pressure: 38.3 mmHg (< 30mmHg)  IVC Diam:         2.24 cm    PULMONIC VALVE:  Normal Ranges:  PV Max Glen: 1.0 m/s  (0.6-0.9m/s)  PV Max P.1 mmHg    Pulmonary Veins:  PulmV A Revs Dur: 92.00 msec  PulmV A Revs Glen: 29.90 cm/s  PulmV Ely Glen:   55.20 cm/s  PulmV S/D Glen:    1.40  PulmV Sys Glne:    74.80 cm/s      [id number] Rafa Rivera MD  Electronically signed on 2021 at 12:23:47 AM         Final     No results found for this or any previous visit from the past 1095 days.     No results found for: \"EF\"  No results found for: \"PREGTESTUR\", \"PREGSERUM\", \"HCG\", \"HCGQUANT\"                             Anesthesia Plan    History of general anesthesia?: yes  History of complications of general anesthesia?: no    ASA 3     general     intravenous induction   Anesthetic plan and risks discussed with patient.    Plan discussed with CRNA and CAA.             [1]   Past Medical History:  Diagnosis Date    Atrial fibrillation (Multi)     CKD (chronic kidney disease)     Diabetes mellitus (Multi)     HTN (hypertension)     Hyperlipidemia    [2]   Past Surgical History:  Procedure Laterality Date    CARDIOVERSION      CATARACT EXTRACTION W/  INTRAOCULAR LENS IMPLANT      CHOLECYSTECTOMY      COLONOSCOPY      ECTOPIC PREGNANCY SURGERY      LUMBAR SPINE SURGERY  " 2010    SKIN CANCER EXCISION      SPINAL FUSION  04/05/2011    [id number]    TUBAL LIGATION     [3]   Social History  Tobacco Use    Smoking status: Never     Passive exposure: Never    Smokeless tobacco: Never   Substance Use Topics    Alcohol use: Never    Drug use: Never   [4]   Allergies  Allergen Reactions    Codeine Other     UPSET STOMACH    Latex Rash     LATEX GLOVES   [5] No current facility-administered medications for this encounter.    Current Outpatient Medications:     amiodarone (Pacerone) 200 mg tablet, Take half a tablet daily., Disp: , Rfl:     amLODIPine (Norvasc) 10 mg tablet, Take 1 tablet (10 mg) by mouth once daily., Disp: 90 tablet, Rfl: 3    Autolet lancing device, 1 each 2 times a day. CHECK BLOOD SUGAR TWICE DAILY, Disp: 100 each, Rfl: 1    blood sugar diagnostic (Accu-Chek Guide test strips) strip, 1 strip 2 times a day. CHECK BLOOD SUGAR TWICE DAILY, Disp: 100 strip, Rfl: 2    cholecalciferol (Vitamin D-3) 50 MCG (2000 UT) tablet, Take 1 tablet (50 mcg) by mouth once daily., Disp: 90 tablet, Rfl: 1    citalopram (CeleXA) 20 mg tablet, Take 1 tablet (20 mg) by mouth once every day., Disp: 90 tablet, Rfl: 1    cranberry 400 mg capsule, Take 1 tablet by mouth once daily., Disp: , Rfl:     diclofenac sodium (Voltaren) 1 % gel, APPLY TO THE AFFECTED AREAS 2 GRAMS (2.25 INCHES) TOPICALLY TWICE DAILY AS NEEDED, Disp: 200 g, Rfl: 0    fenofibrate (Tricor) 145 mg tablet, Take 1 tablet (145 mg) by mouth once daily., Disp: 90 tablet, Rfl: 3    ferrous gluconate 324 (38 Fe) mg tablet, Take 1 tablet (324 mg) by mouth 2 times a day., Disp: 180 tablet, Rfl: 2    lutein 6 mg capsule, Take by mouth., Disp: , Rfl:     metoprolol tartrate (Lopressor) 50 mg tablet, Take 1 tablet by mouth 2 times a day., Disp: 180 tablet, Rfl: 1    olmesartan (BENIcar) 40 mg tablet, Take 1 tablet (40 mg) by mouth once daily., Disp: 90 tablet, Rfl: 1    oxybutynin XL (Ditropan-XL) 10 mg 24 hr tablet, Take 1 tablet (10 mg) by  mouth once daily at bedtime., Disp: 90 tablet, Rfl: 3    rivaroxaban (Xarelto) 15 mg tablet, Take 1 tablet (15 mg) by mouth once daily in the evening. Take with meals. Take with food., Disp: , Rfl:     simvastatin (Zocor) 20 mg tablet, Take 1 tablet (20 mg) by mouth once every day., Disp: 90 tablet, Rfl: 1    tirzepatide (Mounjaro) 5 mg/0.5 mL pen injector, INJECT 5MG (1 PEN) UNDER THE SKIN EVERY WEEK, Disp: 2 mL, Rfl: 3

## 2025-05-21 ENCOUNTER — PHARMACY VISIT (OUTPATIENT)
Dept: PHARMACY | Facility: CLINIC | Age: 80
End: 2025-05-21
Payer: COMMERCIAL

## 2025-05-21 ENCOUNTER — HOSPITAL ENCOUNTER (OUTPATIENT)
Facility: HOSPITAL | Age: 80
Setting detail: OUTPATIENT SURGERY
Discharge: HOME | End: 2025-05-21
Attending: INTERNAL MEDICINE | Admitting: INTERNAL MEDICINE
Payer: MEDICARE

## 2025-05-21 ENCOUNTER — HOSPITAL ENCOUNTER (OUTPATIENT)
Dept: CARDIOLOGY | Facility: HOSPITAL | Age: 80
Setting detail: OUTPATIENT SURGERY
Discharge: HOME | End: 2025-05-21
Payer: MEDICARE

## 2025-05-21 VITALS
SYSTOLIC BLOOD PRESSURE: 113 MMHG | WEIGHT: 175.04 LBS | HEIGHT: 64 IN | TEMPERATURE: 97.5 F | HEART RATE: 76 BPM | BODY MASS INDEX: 29.88 KG/M2 | DIASTOLIC BLOOD PRESSURE: 44 MMHG | RESPIRATION RATE: 16 BRPM | OXYGEN SATURATION: 95 %

## 2025-05-21 DIAGNOSIS — Z86.79 STATUS POST RADIOFREQUENCY ABLATION (RFA) OPERATION FOR ARRHYTHMIA: ICD-10-CM

## 2025-05-21 DIAGNOSIS — I48.0 PAROXYSMAL ATRIAL FIBRILLATION (MULTI): Primary | ICD-10-CM

## 2025-05-21 DIAGNOSIS — Z98.890 STATUS POST RADIOFREQUENCY ABLATION (RFA) OPERATION FOR ARRHYTHMIA: ICD-10-CM

## 2025-05-21 LAB
ABO GROUP (TYPE) IN BLOOD: NORMAL
ACT BLD: 269 SEC (ref 82–174)
ACT BLD: 337 SEC (ref 82–174)
ACT BLD: 384 SEC (ref 82–174)
ACT BLD: 393 SEC (ref 82–174)
ANTIBODY SCREEN: NORMAL
GLUCOSE BLD MANUAL STRIP-MCNC: 87 MG/DL (ref 74–99)
GLUCOSE BLD MANUAL STRIP-MCNC: 96 MG/DL (ref 74–99)
RH FACTOR (ANTIGEN D): NORMAL

## 2025-05-21 PROCEDURE — 93662 INTRACARDIAC ECG (ICE): CPT | Performed by: INTERNAL MEDICINE

## 2025-05-21 PROCEDURE — C1732 CATH, EP, DIAG/ABL, 3D/VECT: HCPCS | Performed by: INTERNAL MEDICINE

## 2025-05-21 PROCEDURE — 93005 ELECTROCARDIOGRAM TRACING: CPT | Mod: 59

## 2025-05-21 PROCEDURE — A93656 PR EPHYS EVL TRNSPTL TX ATRIAL FIB ISOLAT PULM VEIN: Performed by: ANESTHESIOLOGIST ASSISTANT

## 2025-05-21 PROCEDURE — 7100000002 HC RECOVERY ROOM TIME - EACH INCREMENTAL 1 MINUTE: Performed by: INTERNAL MEDICINE

## 2025-05-21 PROCEDURE — 2500000004 HC RX 250 GENERAL PHARMACY W/ HCPCS (ALT 636 FOR OP/ED): Mod: JZ | Performed by: ANESTHESIOLOGY

## 2025-05-21 PROCEDURE — C1766 INTRO/SHEATH,STRBLE,NON-PEEL: HCPCS | Performed by: INTERNAL MEDICINE

## 2025-05-21 PROCEDURE — 93010 ELECTROCARDIOGRAM REPORT: CPT | Performed by: STUDENT IN AN ORGANIZED HEALTH CARE EDUCATION/TRAINING PROGRAM

## 2025-05-21 PROCEDURE — 2500000005 HC RX 250 GENERAL PHARMACY W/O HCPCS: Performed by: ANESTHESIOLOGIST ASSISTANT

## 2025-05-21 PROCEDURE — 7100000009 HC PHASE TWO TIME - INITIAL BASE CHARGE: Performed by: INTERNAL MEDICINE

## 2025-05-21 PROCEDURE — 2500000004 HC RX 250 GENERAL PHARMACY W/ HCPCS (ALT 636 FOR OP/ED): Performed by: INTERNAL MEDICINE

## 2025-05-21 PROCEDURE — A93656 PR EPHYS EVL TRNSPTL TX ATRIAL FIB ISOLAT PULM VEIN: Performed by: ANESTHESIOLOGY

## 2025-05-21 PROCEDURE — 85347 COAGULATION TIME ACTIVATED: CPT

## 2025-05-21 PROCEDURE — 93656 COMPRE EP EVAL ABLTJ ATR FIB: CPT | Performed by: INTERNAL MEDICINE

## 2025-05-21 PROCEDURE — C1731 CATH, EP, 20 OR MORE ELEC: HCPCS | Performed by: INTERNAL MEDICINE

## 2025-05-21 PROCEDURE — RXMED WILLOW AMBULATORY MEDICATION CHARGE

## 2025-05-21 PROCEDURE — C1893 INTRO/SHEATH, FIXED,NON-PEEL: HCPCS | Performed by: INTERNAL MEDICINE

## 2025-05-21 PROCEDURE — 2500000004 HC RX 250 GENERAL PHARMACY W/ HCPCS (ALT 636 FOR OP/ED): Mod: JZ | Performed by: NURSE PRACTITIONER

## 2025-05-21 PROCEDURE — C1730 CATH, EP, 19 OR FEW ELECT: HCPCS | Performed by: INTERNAL MEDICINE

## 2025-05-21 PROCEDURE — 2720000007 HC OR 272 NO HCPCS: Performed by: INTERNAL MEDICINE

## 2025-05-21 PROCEDURE — 3700000002 HC GENERAL ANESTHESIA TIME - EACH INCREMENTAL 1 MINUTE: Performed by: INTERNAL MEDICINE

## 2025-05-21 PROCEDURE — 7100000010 HC PHASE TWO TIME - EACH INCREMENTAL 1 MINUTE: Performed by: INTERNAL MEDICINE

## 2025-05-21 PROCEDURE — 2500000005 HC RX 250 GENERAL PHARMACY W/O HCPCS: Performed by: NURSE PRACTITIONER

## 2025-05-21 PROCEDURE — 36415 COLL VENOUS BLD VENIPUNCTURE: CPT | Performed by: NURSE PRACTITIONER

## 2025-05-21 PROCEDURE — 86901 BLOOD TYPING SEROLOGIC RH(D): CPT | Performed by: NURSE PRACTITIONER

## 2025-05-21 PROCEDURE — 82947 ASSAY GLUCOSE BLOOD QUANT: CPT

## 2025-05-21 PROCEDURE — 36620 INSERTION CATHETER ARTERY: CPT | Performed by: ANESTHESIOLOGY

## 2025-05-21 PROCEDURE — 2500000004 HC RX 250 GENERAL PHARMACY W/ HCPCS (ALT 636 FOR OP/ED): Mod: JZ | Performed by: ANESTHESIOLOGIST ASSISTANT

## 2025-05-21 PROCEDURE — C1759 CATH, INTRA ECHOCARDIOGRAPHY: HCPCS | Performed by: INTERNAL MEDICINE

## 2025-05-21 PROCEDURE — 3700000001 HC GENERAL ANESTHESIA TIME - INITIAL BASE CHARGE: Performed by: INTERNAL MEDICINE

## 2025-05-21 PROCEDURE — 7100000001 HC RECOVERY ROOM TIME - INITIAL BASE CHARGE: Performed by: INTERNAL MEDICINE

## 2025-05-21 PROCEDURE — 99100 ANES PT EXTEME AGE<1 YR&>70: CPT | Performed by: ANESTHESIOLOGY

## 2025-05-21 PROCEDURE — 99223 1ST HOSP IP/OBS HIGH 75: CPT | Performed by: NURSE PRACTITIONER

## 2025-05-21 RX ORDER — GLYCOPYRROLATE 0.2 MG/ML
INJECTION INTRAMUSCULAR; INTRAVENOUS AS NEEDED
Status: DISCONTINUED | OUTPATIENT
Start: 2025-05-21 | End: 2025-05-21

## 2025-05-21 RX ORDER — ONDANSETRON HYDROCHLORIDE 2 MG/ML
4 INJECTION, SOLUTION INTRAVENOUS ONCE AS NEEDED
Status: DISCONTINUED | OUTPATIENT
Start: 2025-05-21 | End: 2025-05-21

## 2025-05-21 RX ORDER — IPRATROPIUM BROMIDE 0.5 MG/2.5ML
500 SOLUTION RESPIRATORY (INHALATION) ONCE
Status: DISCONTINUED | OUTPATIENT
Start: 2025-05-21 | End: 2025-05-21 | Stop reason: HOSPADM

## 2025-05-21 RX ORDER — OXYCODONE HYDROCHLORIDE 5 MG/1
5 TABLET ORAL EVERY 4 HOURS PRN
Status: DISCONTINUED | OUTPATIENT
Start: 2025-05-21 | End: 2025-05-21 | Stop reason: HOSPADM

## 2025-05-21 RX ORDER — LIDOCAINE HYDROCHLORIDE 10 MG/ML
0.1 INJECTION, SOLUTION INFILTRATION; PERINEURAL ONCE
Status: DISCONTINUED | OUTPATIENT
Start: 2025-05-21 | End: 2025-05-21 | Stop reason: HOSPADM

## 2025-05-21 RX ORDER — DROPERIDOL 2.5 MG/ML
0.62 INJECTION, SOLUTION INTRAMUSCULAR; INTRAVENOUS ONCE AS NEEDED
Status: DISCONTINUED | OUTPATIENT
Start: 2025-05-21 | End: 2025-05-21 | Stop reason: HOSPADM

## 2025-05-21 RX ORDER — ONDANSETRON 4 MG/1
4 TABLET, FILM COATED ORAL EVERY 8 HOURS PRN
Status: DISCONTINUED | OUTPATIENT
Start: 2025-05-21 | End: 2025-05-21 | Stop reason: HOSPADM

## 2025-05-21 RX ORDER — HEPARIN SODIUM 1000 [USP'U]/ML
INJECTION, SOLUTION INTRAVENOUS; SUBCUTANEOUS AS NEEDED
Status: DISCONTINUED | OUTPATIENT
Start: 2025-05-21 | End: 2025-05-21

## 2025-05-21 RX ORDER — DEXAMETHASONE SODIUM PHOSPHATE 10 MG/ML
INJECTION INTRAMUSCULAR; INTRAVENOUS AS NEEDED
Status: DISCONTINUED | OUTPATIENT
Start: 2025-05-21 | End: 2025-05-21

## 2025-05-21 RX ORDER — ONDANSETRON HYDROCHLORIDE 2 MG/ML
4 INJECTION, SOLUTION INTRAVENOUS ONCE AS NEEDED
Status: DISCONTINUED | OUTPATIENT
Start: 2025-05-21 | End: 2025-05-21 | Stop reason: HOSPADM

## 2025-05-21 RX ORDER — ALBUTEROL SULFATE 0.83 MG/ML
2.5 SOLUTION RESPIRATORY (INHALATION) ONCE AS NEEDED
Status: DISCONTINUED | OUTPATIENT
Start: 2025-05-21 | End: 2025-05-21

## 2025-05-21 RX ORDER — PROPOFOL 10 MG/ML
INJECTION, EMULSION INTRAVENOUS AS NEEDED
Status: DISCONTINUED | OUTPATIENT
Start: 2025-05-21 | End: 2025-05-21

## 2025-05-21 RX ORDER — ACETAMINOPHEN 325 MG/1
650 TABLET ORAL EVERY 4 HOURS PRN
Status: DISCONTINUED | OUTPATIENT
Start: 2025-05-21 | End: 2025-05-21 | Stop reason: HOSPADM

## 2025-05-21 RX ORDER — SODIUM CHLORIDE 9 MG/ML
100 INJECTION, SOLUTION INTRAVENOUS CONTINUOUS
Status: DISCONTINUED | OUTPATIENT
Start: 2025-05-21 | End: 2025-05-21 | Stop reason: HOSPADM

## 2025-05-21 RX ORDER — OXYCODONE HYDROCHLORIDE 5 MG/1
5 TABLET ORAL EVERY 4 HOURS PRN
Status: DISCONTINUED | OUTPATIENT
Start: 2025-05-21 | End: 2025-05-21

## 2025-05-21 RX ORDER — SODIUM CHLORIDE, SODIUM LACTATE, POTASSIUM CHLORIDE, CALCIUM CHLORIDE 600; 310; 30; 20 MG/100ML; MG/100ML; MG/100ML; MG/100ML
100 INJECTION, SOLUTION INTRAVENOUS CONTINUOUS
Status: ACTIVE | OUTPATIENT
Start: 2025-05-21 | End: 2025-05-21

## 2025-05-21 RX ORDER — DEXTROSE 50 % IN WATER (D50W) INTRAVENOUS SYRINGE
25
Status: DISCONTINUED | OUTPATIENT
Start: 2025-05-21 | End: 2025-05-21 | Stop reason: HOSPADM

## 2025-05-21 RX ORDER — PROTAMINE SULFATE 10 MG/ML
INJECTION, SOLUTION INTRAVENOUS AS NEEDED
Status: DISCONTINUED | OUTPATIENT
Start: 2025-05-21 | End: 2025-05-21

## 2025-05-21 RX ORDER — AMIODARONE HYDROCHLORIDE 200 MG/1
200 TABLET ORAL DAILY
Qty: 90 TABLET | Refills: 0 | Status: SHIPPED | OUTPATIENT
Start: 2025-05-21

## 2025-05-21 RX ORDER — ALBUTEROL SULFATE 0.83 MG/ML
2.5 SOLUTION RESPIRATORY (INHALATION) ONCE AS NEEDED
Status: DISCONTINUED | OUTPATIENT
Start: 2025-05-21 | End: 2025-05-21 | Stop reason: HOSPADM

## 2025-05-21 RX ORDER — DEXTROSE 50 % IN WATER (D50W) INTRAVENOUS SYRINGE
12.5
Status: DISCONTINUED | OUTPATIENT
Start: 2025-05-21 | End: 2025-05-21 | Stop reason: HOSPADM

## 2025-05-21 RX ORDER — LIDOCAINE HYDROCHLORIDE 20 MG/ML
INJECTION, SOLUTION EPIDURAL; INFILTRATION; INTRACAUDAL; PERINEURAL AS NEEDED
Status: DISCONTINUED | OUTPATIENT
Start: 2025-05-21 | End: 2025-05-21

## 2025-05-21 RX ORDER — DROPERIDOL 2.5 MG/ML
0.62 INJECTION, SOLUTION INTRAMUSCULAR; INTRAVENOUS ONCE AS NEEDED
Status: DISCONTINUED | OUTPATIENT
Start: 2025-05-21 | End: 2025-05-21

## 2025-05-21 RX ORDER — DEXTROSE 50 % IN WATER (D50W) INTRAVENOUS SYRINGE
25
Status: DISCONTINUED | OUTPATIENT
Start: 2025-05-21 | End: 2025-05-21

## 2025-05-21 RX ORDER — DEXTROSE 50 % IN WATER (D50W) INTRAVENOUS SYRINGE
12.5
Status: DISCONTINUED | OUTPATIENT
Start: 2025-05-21 | End: 2025-05-21

## 2025-05-21 RX ORDER — PANTOPRAZOLE SODIUM 40 MG/1
40 TABLET, DELAYED RELEASE ORAL DAILY
Qty: 30 TABLET | Refills: 0 | Status: SHIPPED | OUTPATIENT
Start: 2025-05-21 | End: 2025-06-20

## 2025-05-21 RX ORDER — DIPHENHYDRAMINE HYDROCHLORIDE 50 MG/ML
25 INJECTION, SOLUTION INTRAMUSCULAR; INTRAVENOUS ONCE AS NEEDED
Status: DISCONTINUED | OUTPATIENT
Start: 2025-05-21 | End: 2025-05-21 | Stop reason: HOSPADM

## 2025-05-21 RX ORDER — LIDOCAINE HYDROCHLORIDE 10 MG/ML
INJECTION, SOLUTION EPIDURAL; INFILTRATION; INTRACAUDAL; PERINEURAL AS NEEDED
Status: DISCONTINUED | OUTPATIENT
Start: 2025-05-21 | End: 2025-05-21 | Stop reason: HOSPADM

## 2025-05-21 RX ORDER — ROCURONIUM BROMIDE 10 MG/ML
INJECTION, SOLUTION INTRAVENOUS AS NEEDED
Status: DISCONTINUED | OUTPATIENT
Start: 2025-05-21 | End: 2025-05-21

## 2025-05-21 RX ORDER — ONDANSETRON HYDROCHLORIDE 2 MG/ML
4 INJECTION, SOLUTION INTRAVENOUS EVERY 8 HOURS PRN
Status: DISCONTINUED | OUTPATIENT
Start: 2025-05-21 | End: 2025-05-21 | Stop reason: HOSPADM

## 2025-05-21 RX ORDER — NORETHINDRONE AND ETHINYL ESTRADIOL 0.5-0.035
KIT ORAL AS NEEDED
Status: DISCONTINUED | OUTPATIENT
Start: 2025-05-21 | End: 2025-05-21

## 2025-05-21 RX ORDER — FENTANYL CITRATE 50 UG/ML
INJECTION, SOLUTION INTRAMUSCULAR; INTRAVENOUS AS NEEDED
Status: DISCONTINUED | OUTPATIENT
Start: 2025-05-21 | End: 2025-05-21

## 2025-05-21 RX ORDER — ONDANSETRON HYDROCHLORIDE 2 MG/ML
INJECTION, SOLUTION INTRAVENOUS AS NEEDED
Status: DISCONTINUED | OUTPATIENT
Start: 2025-05-21 | End: 2025-05-21

## 2025-05-21 RX ORDER — HEPARIN SODIUM 10000 [USP'U]/100ML
INJECTION, SOLUTION INTRAVENOUS CONTINUOUS PRN
Status: DISCONTINUED | OUTPATIENT
Start: 2025-05-21 | End: 2025-05-21

## 2025-05-21 RX ADMIN — SUGAMMADEX 200 MG: 100 INJECTION, SOLUTION INTRAVENOUS at 10:55

## 2025-05-21 RX ADMIN — SODIUM CHLORIDE: 9 INJECTION, SOLUTION INTRAVENOUS at 08:23

## 2025-05-21 RX ADMIN — Medication 4 L/MIN: at 11:45

## 2025-05-21 RX ADMIN — HEPARIN SODIUM AND DEXTROSE 13.85 UNITS/KG/HR: 10000; 5 INJECTION INTRAVENOUS at 09:40

## 2025-05-21 RX ADMIN — ONDANSETRON 4 MG: 2 INJECTION INTRAMUSCULAR; INTRAVENOUS at 10:55

## 2025-05-21 RX ADMIN — GLYCOPYRROLATE 0.2 MG: 0.2 INJECTION, SOLUTION INTRAMUSCULAR; INTRAVENOUS at 08:53

## 2025-05-21 RX ADMIN — DEXAMETHASONE SODIUM PHOSPHATE 5 MG: 10 INJECTION, SOLUTION INTRAMUSCULAR; INTRAVENOUS at 09:36

## 2025-05-21 RX ADMIN — FENTANYL CITRATE 50 MCG: 50 INJECTION, SOLUTION INTRAMUSCULAR; INTRAVENOUS at 10:05

## 2025-05-21 RX ADMIN — EPHEDRINE SULFATE 5 MG: 50 INJECTION, SOLUTION INTRAVENOUS at 10:49

## 2025-05-21 RX ADMIN — EPHEDRINE SULFATE 20 MG: 50 INJECTION, SOLUTION INTRAVENOUS at 08:55

## 2025-05-21 RX ADMIN — Medication 4 L/MIN: at 11:30

## 2025-05-21 RX ADMIN — Medication 4 L/MIN: at 11:12

## 2025-05-21 RX ADMIN — Medication 2 L/MIN: at 15:00

## 2025-05-21 RX ADMIN — HYDROMORPHONE HYDROCHLORIDE 0.5 MG: 1 INJECTION, SOLUTION INTRAMUSCULAR; INTRAVENOUS; SUBCUTANEOUS at 12:40

## 2025-05-21 RX ADMIN — EPHEDRINE SULFATE 5 MG: 50 INJECTION, SOLUTION INTRAVENOUS at 10:17

## 2025-05-21 RX ADMIN — ROCURONIUM BROMIDE 70 MG: 10 INJECTION, SOLUTION INTRAVENOUS at 08:37

## 2025-05-21 RX ADMIN — ROCURONIUM BROMIDE 10 MG: 10 INJECTION, SOLUTION INTRAVENOUS at 10:33

## 2025-05-21 RX ADMIN — PROPOFOL 100 MG: 10 INJECTION, EMULSION INTRAVENOUS at 08:36

## 2025-05-21 RX ADMIN — PROTAMINE SULFATE 40 MG: 10 INJECTION, SOLUTION INTRAVENOUS at 10:47

## 2025-05-21 RX ADMIN — ROCURONIUM BROMIDE 20 MG: 10 INJECTION, SOLUTION INTRAVENOUS at 09:50

## 2025-05-21 RX ADMIN — HEPARIN SODIUM 10000 UNITS: 1000 INJECTION, SOLUTION INTRAVENOUS; SUBCUTANEOUS at 09:39

## 2025-05-21 RX ADMIN — LIDOCAINE HYDROCHLORIDE 40 MG: 20 INJECTION, SOLUTION EPIDURAL; INFILTRATION; INTRACAUDAL; PERINEURAL at 08:36

## 2025-05-21 RX ADMIN — Medication 4 L/MIN: at 11:15

## 2025-05-21 RX ADMIN — HEPARIN SODIUM 10000 UNITS: 1000 INJECTION, SOLUTION INTRAVENOUS; SUBCUTANEOUS at 10:11

## 2025-05-21 RX ADMIN — Medication 4 L/MIN: at 12:00

## 2025-05-21 RX ADMIN — FENTANYL CITRATE 50 MCG: 50 INJECTION, SOLUTION INTRAMUSCULAR; INTRAVENOUS at 08:36

## 2025-05-21 ASSESSMENT — PAIN SCALES - GENERAL
PAINLEVEL_OUTOF10: 5 - MODERATE PAIN
PAINLEVEL_OUTOF10: 4
PAINLEVEL_OUTOF10: 3
PAINLEVEL_OUTOF10: 0 - NO PAIN
PAINLEVEL_OUTOF10: 6
PAINLEVEL_OUTOF10: 0 - NO PAIN
PAINLEVEL_OUTOF10: 3
PAINLEVEL_OUTOF10: 0 - NO PAIN
PAINLEVEL_OUTOF10: 3
PAINLEVEL_OUTOF10: 0 - NO PAIN
PAINLEVEL_OUTOF10: 8
PAINLEVEL_OUTOF10: 0 - NO PAIN
PAINLEVEL_OUTOF10: 0 - NO PAIN
PAINLEVEL_OUTOF10: 5 - MODERATE PAIN
PAINLEVEL_OUTOF10: 0 - NO PAIN
PAIN_LEVEL: 2
PAINLEVEL_OUTOF10: 4

## 2025-05-21 ASSESSMENT — ENCOUNTER SYMPTOMS
HEMATOLOGIC/LYMPHATIC NEGATIVE: 1
PSYCHIATRIC NEGATIVE: 1
ENDOCRINE NEGATIVE: 1
EYES NEGATIVE: 1
MUSCULOSKELETAL NEGATIVE: 1
CARDIOVASCULAR NEGATIVE: 1
ALLERGIC/IMMUNOLOGIC NEGATIVE: 1
CONSTITUTIONAL NEGATIVE: 1
RESPIRATORY NEGATIVE: 1
GASTROINTESTINAL NEGATIVE: 1
NEUROLOGICAL NEGATIVE: 1

## 2025-05-21 ASSESSMENT — COLUMBIA-SUICIDE SEVERITY RATING SCALE - C-SSRS
6. HAVE YOU EVER DONE ANYTHING, STARTED TO DO ANYTHING, OR PREPARED TO DO ANYTHING TO END YOUR LIFE?: NO
1. IN THE PAST MONTH, HAVE YOU WISHED YOU WERE DEAD OR WISHED YOU COULD GO TO SLEEP AND NOT WAKE UP?: NO
2. HAVE YOU ACTUALLY HAD ANY THOUGHTS OF KILLING YOURSELF?: NO

## 2025-05-21 ASSESSMENT — PAIN - FUNCTIONAL ASSESSMENT

## 2025-05-21 NOTE — ANESTHESIA PROCEDURE NOTES
Peripheral IV  Date/Time: 5/21/2025 8:59 AM  Inserted by: Thony Tipton    Placement  Needle size: 18 G  Laterality: left  Location: hand  Local anesthetic: none  Site prep: alcohol  Technique: anatomical landmarks  Attempts: 1

## 2025-05-21 NOTE — ANESTHESIA POSTPROCEDURE EVALUATION
Patient: Francheska Ochoa    Procedure Summary       Date: 05/21/25 Room / Location: [facility]    Anesthesia Start: 0824 Anesthesia Stop: 1114    Procedure: Ablation A-Fib Diagnosis:       Paroxysmal atrial fibrillation (Multi)      (Paroxysmal atrial fibrillation (Multi) [I48.0])    Providers: Cesar Thibodeaux MD Responsible Provider: Richi North MD    Anesthesia Type: general ASA Status: 3            Anesthesia Type: general    Vitals Value Taken Time   /42 05/21/25 15:30   Temp 36.5 °C (97.7 °F) 05/21/25 14:30   Pulse 70 05/21/25 15:30   Resp 17 05/21/25 15:30   SpO2 98 % 05/21/25 15:30       Anesthesia Post Evaluation    Patient location during evaluation: PACU  Patient participation: complete - patient participated  Level of consciousness: awake  Pain score: 2  Pain management: adequate  Airway patency: patent  Cardiovascular status: acceptable, hemodynamically stable and stable  Respiratory status: acceptable and room air  Hydration status: acceptable  Postoperative Nausea and Vomiting: none        There were no known notable events for this encounter.

## 2025-05-21 NOTE — ANESTHESIA PROCEDURE NOTES
Arterial Line:    Date/Time: 5/21/2025 8:59 AM    Staffing  Performed: [facility]   Authorized by: Richi North MD    Performed by: TRIPP Olvera    An arterial line was placed. Procedure performed using surface landmarks.in the OR for the following indication(s): continuous blood pressure monitoring and blood sampling needed.    A 20 gauge (size), 1 and 3/4 inch (length), Angiocath (type) catheter was placed into the Right radial artery, secured by Tegaderm,   Seldinger technique used.  Events:  patient tolerated procedure well with no complications.

## 2025-05-21 NOTE — SIGNIFICANT EVENT
NP was notified by bedside nurse that patient's left groin site was bleeding after suture had been removed for a period of time.  NP arrived to bedside and evaluated left groin site.  There appears to be active bleeding underneath the current dressing that has a 2 x 2.  Dressing was removed and a 4 x 4 was applied for pressure.  NP instructed bedside nurse to apply pressure for 20 minutes and then apply Tegaderm without removing dressing from left groin site.  After hemostasis, patient should remain flat for another 20 minutes and then is able to sit up slowly after that.  Patient can be discharged if she is able to ambulate without any further bleeding issues.

## 2025-05-21 NOTE — DISCHARGE INSTRUCTIONS
Please do not miss any doses of your anticoagulant (Eliquis/Xarelto/Warfarin). This is very important to prevent the risk of stroke.       -----PLEASE RESUME XARELTO TONIGHT 5/21/25 ------          INSTRUCTIONS AFTER ABLATION PROCEDURE:    * You will need to continue blood thinner (warfarin, Pradaxa, Xarelto or Eliquis) until instructed otherwise. It is important not to interrupt blood thinner for any reason (other than an emergency) during the first 30 days after ablation.    * You will be on Pantoprazole (a heartburn medicine) for 4 weeks to protect the esophagus as it can become irritated with ablation. It is very important that you take this medication.    * All other medications will generally remain the same unless you are told otherwise.  Resume taking your home medications today (including blood thinner) as listed on the discharge instructions.    * In the first week post-ablation you should take it easy. No heavy lifting or heavy exercise, no treadmill. You can use the stairs if needed but go slowly and minimize the number of times up and down.    * Some minor bruising is common at each groin access site with minor soreness as if you had banged the area. Bruising may occasionally be seen to extend down the leg. This is normal as is an occasional small quarter sized bump in the area. If larger swelling or more significant pain occurs at the area, please contact the office or go the nearest Emergency Room.    * You may have some minor chest pain for the next week or so. The pain will often worsen with a deep breath and be better when leaning forward. This is pericardial chest pain from the ablation and is generally not of concern. It should resolve within a week although it might increase for a day or so after the ablation.    * If you develop unexplained a fever exceeding 100 degrees anytime within the first 3 weeks post-ablation, you need to contact the office. Low grade fevers of around 99 degrees are  common in the first day or so post-ablation.    * Atrial fibrillation (AFib) can recur in all patients who undergo this ablation for up to 4-8 weeks post-ablation. The ablation itself can cause inflammation (pericarditis) in the atria and this can cause AFib. Some patients will actually experience an increased amount of atrial arrhythmia early after ablation. Approximately 1/3 of patients will have this early recurrence of AFib. Medications should be continued and your heart rate controlled. Nothing else needs be done initially except waiting as in many cases these episodes of AFib will prove self limited.    * Continue to follow up with your primary care physician, primary cardiologist, and any other specialists you normally see.    * No driving for at least 24 hours post procedure (IF you were driving prior to procedure)    *Diet: Heart healthy    Call Provider If:  Breathing faster than normal.     Fever of 100.4 F (38 C) or higher.     Chills.     Any new concerning symptoms.     Passing out.     Patient Instructions, Next 24 hours:  DO NOT drive a car, operate machinery or power tools.  It is recommended that a responsible adult be with you for the first 24 hours.     DO NOT drink any alcoholic drinks or take any non-prescriptive medications that contain alcohol for the first 24 hours.     DO NOT make any important decisions for the first 24 hours.    Activity:  You are advised to go directly home from the hospital.     DO NOT lift anything heavier than 10 pounds for one week, this allows for proper healing of the groin.     No excessive exercise or treadmill use for one week. You may walk and do stairs, slowly.     No sexual activities for 24 hours after you arrive home.    Wound Care:  If slight bleeding should occur at groin site, lie down and have someone apply firm pressure just above the puncture site for 5 minutes.  If it continues or is profuse, call 911. Always notify your doctor if bleeding occurs.      Keep site clean and dry. Let air dry or you may use a simple bandaid.     Gently cleanse the puncture site in your groin with soap and water only.     You may experience some tenderness, bruising or minimal inflammation.  If you have any concerns, you may contact the EP Lab or if any of these symptoms become excessive, contact your electrophysiologist or go to the emergency room.     No tub baths, soaking, hot tubs, or swimming for one week.     May shower the next day after your procedure.    Other Instructions:  If you have any questions about the effects of the sedative drugs or groin care, please call the physician who performed your procedure.      FOLLOW UP:   An appointment has been requested- please call the office if you do not hear anything in 2 business days

## 2025-05-21 NOTE — ANESTHESIA PROCEDURE NOTES
Airway  Date/Time: 5/21/2025 8:39 AM  Reason: elective      Staffing  Performed: [facility] and [facility]   Authorized by: Richi North MD    Performed by: TRIPP Olvera  Patient location during procedure: OR    Patient Condition  Indications for airway management: anesthesia and airway protection  Patient position: sniffing  Planned trial extubation  Sedation level: deep     Final Airway Details   Preoxygenated: yes  Final airway type: endotracheal airway  Successful airway: ETT  Cuffed: yes   Successful intubation technique: video laryngoscopy  Adjuncts used in placement: intubating stylet  Blade: [place]  Blade size: #3  ETT size (mm): 6.5  Cormack-Lehane Classification: grade I - full view of glottis  Placement verified by: chest auscultation and capnometry   Measured from: teeth  ETT to teeth (cm): 20  Number of attempts at approach: 1    Additional Comments  TATI Sanford

## 2025-05-23 LAB
ATRIAL RATE: 75 BPM
P AXIS: 90 DEGREES
P OFFSET: 201 MS
P ONSET: 139 MS
PR INTERVAL: 162 MS
Q ONSET: 220 MS
QRS COUNT: 12 BEATS
QRS DURATION: 100 MS
QT INTERVAL: 414 MS
QTC CALCULATION(BAZETT): 462 MS
QTC FREDERICIA: 445 MS
R AXIS: 26 DEGREES
T AXIS: 74 DEGREES
T OFFSET: 427 MS
VENTRICULAR RATE: 75 BPM

## 2025-05-28 DIAGNOSIS — E55.9 VITAMIN D DEFICIENCY: Primary | ICD-10-CM

## 2025-05-28 RX ORDER — ACETAMINOPHEN 500 MG
50 TABLET ORAL DAILY
Qty: 90 CAPSULE | Refills: 3 | Status: SHIPPED | OUTPATIENT
Start: 2025-05-28

## 2025-07-02 ENCOUNTER — OFFICE VISIT (OUTPATIENT)
Dept: CARDIOLOGY | Facility: HOSPITAL | Age: 80
End: 2025-07-02
Payer: MEDICARE

## 2025-07-02 VITALS
HEART RATE: 61 BPM | BODY MASS INDEX: 32.2 KG/M2 | WEIGHT: 175 LBS | SYSTOLIC BLOOD PRESSURE: 101 MMHG | OXYGEN SATURATION: 96 % | DIASTOLIC BLOOD PRESSURE: 60 MMHG | HEIGHT: 62 IN

## 2025-07-02 DIAGNOSIS — I48.0 PAROXYSMAL ATRIAL FIBRILLATION (MULTI): Primary | ICD-10-CM

## 2025-07-02 PROCEDURE — 3078F DIAST BP <80 MM HG: CPT | Performed by: INTERNAL MEDICINE

## 2025-07-02 PROCEDURE — 99214 OFFICE O/P EST MOD 30 MIN: CPT | Performed by: INTERNAL MEDICINE

## 2025-07-02 PROCEDURE — 3074F SYST BP LT 130 MM HG: CPT | Performed by: INTERNAL MEDICINE

## 2025-07-02 PROCEDURE — 99212 OFFICE O/P EST SF 10 MIN: CPT | Mod: 25

## 2025-07-02 PROCEDURE — 93010 ELECTROCARDIOGRAM REPORT: CPT | Performed by: INTERNAL MEDICINE

## 2025-07-02 PROCEDURE — 93005 ELECTROCARDIOGRAM TRACING: CPT | Performed by: INTERNAL MEDICINE

## 2025-07-02 PROCEDURE — 1036F TOBACCO NON-USER: CPT | Performed by: INTERNAL MEDICINE

## 2025-07-02 PROCEDURE — 1159F MED LIST DOCD IN RCRD: CPT | Performed by: INTERNAL MEDICINE

## 2025-07-02 ASSESSMENT — ENCOUNTER SYMPTOMS
DEPRESSION: 0
OCCASIONAL FEELINGS OF UNSTEADINESS: 1
LOSS OF SENSATION IN FEET: 0

## 2025-07-02 NOTE — PROGRESS NOTES
Referred by Cesar Smalls MD provider found for No chief complaint on file.       Francheska Ochoa is a 79 y.o. year old female patient with h/o A Fib s/p RF ablation 6 weeks ago. Presents for follow up.    PMHx/PSHx: As above    FamHx: unremarkable     Allergies:  RX Allergies[1]     Review of Systems    Constitutional: not feeling tired.   Eyes: no eyesight problems.   ENT: no hearing loss and no nosebleeds.   Cardiovascular: no intermittent leg claudication and as noted in HPI.   Respiratory: no chronic cough and no shortness of breath.   Gastrointestinal: no change in bowel habits and no blood in stools.   Genitourinary: no urinary frequency and no hematuria.   Skin: no skin rashes.   Neurological: no seizures and no frequent falls.   Psychiatric: no depression and not suicidal.   All other systems have been reviewed and are negative for complaint.     Outpatient Medications:  Current Outpatient Medications   Medication Instructions    amiodarone (PACERONE) 200 mg, oral, Daily    amLODIPine (NORVASC) 10 mg, oral, Daily    Autolet lancing device 1 each, miscellaneous, 2 times daily, CHECK BLOOD SUGAR TWICE DAILY    blood sugar diagnostic (Accu-Chek Guide test strips) strip 1 strip, miscellaneous, 2 times daily, CHECK BLOOD SUGAR TWICE DAILY    cholecalciferol (VITAMIN D-3) 50 mcg, oral, Daily    cholecalciferol (VITAMIN D-3) 50 mcg, oral, Daily    citalopram (CELEXA) 20 mg, oral, Every Day    cranberry 400 mg capsule 1 tablet, Daily    diclofenac sodium (Voltaren) 1 % gel APPLY TO THE AFFECTED AREAS 2 GRAMS (2.25 INCHES) TOPICALLY TWICE DAILY AS NEEDED    fenofibrate (TRICOR) 145 mg, oral, Daily    ferrous gluconate (FERGON) 324 mg, oral, 2 times daily    metoprolol tartrate (LOPRESSOR) 50 mg, oral, 2 times daily    olmesartan (BENICAR) 40 mg, oral, Daily    oxyBUTYnin XL (DITROPAN-XL) 10 mg, oral, Nightly    pantoprazole (PROTONIX) 40 mg, oral, Daily, Do not crush, chew, or split. Take for 30 days post  procedure then stop    rivaroxaban (XARELTO) 15 mg, Daily with evening meal    simvastatin (ZOCOR) 20 mg, oral, Every Day    tirzepatide (Mounjaro) 5 mg/0.5 mL pen injector INJECT 5MG (1 PEN) UNDER THE SKIN EVERY WEEK         Last Recorded Vitals:      5/21/2025     3:00 PM 5/21/2025     3:15 PM 5/21/2025     3:30 PM 5/21/2025     3:45 PM 5/21/2025     4:05 PM 5/21/2025     4:15 PM 5/21/2025     4:30 PM   Vitals   Systolic 120 124 107 113 116 102 113   Diastolic 50 56 42 45 48 51 44   BP Location Right arm Right arm    Right arm Right arm   Heart Rate 72 71 70 69 68 68 76   Temp      36.1 °C (97 °F) 36.4 °C (97.5 °F)   Resp 18 18 17 15 12 17 16    Visit Vitals  OB Status Postmenopausal   Smoking Status Never        Physical Exam:  Constitutional: alert and in no acute distress.   Eyes: no erythema, swelling or discharge from the eye .   Neck: neck is supple, symmetric, trachea midline, no masses  and no thyromegaly .   Pulmonary: no increased work of breathing or signs of respiratory distress  and lungs clear to auscultation.    Cardiovascular: carotid pulses 2+ bilaterally with no bruit , JVP was normal, no thrills , regular rhythm, normal S1 and S2, no murmurs , pedal pulses 2+ bilaterally  and no edema .   Abdomen: abdomen non-tender, no masses  and no hepatomegaly .   Skin: skin warm and dry, normal skin turgor .   Psychiatric judgment and insight is normal  and oriented to person, place and time .        Assessment/Plan   Problem List Items Addressed This Visit           ICD-10-CM    Paroxysmal atrial fibrillation (Multi) - Primary I48.0    Relevant Orders    ECG 12 lead (Clinic Performed)       Francheska Ochoa is a 79 y.o. year old female patient with h/o A Fib s/p RF ablation 6 weeks ago. Presents for follow up.  The patient reports doing well and denies symptoms. Her current ECG shows NSR with narrow QRS and HR of 61 bpm. Will continue her current medications and follow up in 6 weeks (3 months from  ablation).         Cesar Thibodeaux MD  Cardiac Electrophysiology      Thank you very much for allowing me to participate in the care of this pleasant patient. Please do not hesitate to contact me with any further questions or concerns regarding his care.    **Disclaimer: This note was dictated by speech recognition, and every effort has been made to prevent any error in transcription, however minor errors may be present**         [1]   Allergies  Allergen Reactions    Codeine Other     UPSET STOMACH    Latex Rash     LATEX GLOVES

## 2025-07-03 LAB
ATRIAL RATE: 61 BPM
P AXIS: 1 DEGREES
P OFFSET: 208 MS
P ONSET: 153 MS
PR INTERVAL: 138 MS
Q ONSET: 222 MS
QRS COUNT: 10 BEATS
QRS DURATION: 88 MS
QT INTERVAL: 420 MS
QTC CALCULATION(BAZETT): 422 MS
QTC FREDERICIA: 422 MS
R AXIS: 37 DEGREES
T AXIS: 22 DEGREES
T OFFSET: 432 MS
VENTRICULAR RATE: 61 BPM

## 2025-07-10 ENCOUNTER — HOSPITAL ENCOUNTER (OUTPATIENT)
Dept: RADIOLOGY | Facility: EXTERNAL LOCATION | Age: 80
Discharge: HOME | End: 2025-07-10

## 2025-07-10 ENCOUNTER — OFFICE VISIT (OUTPATIENT)
Dept: ORTHOPEDIC SURGERY | Facility: HOSPITAL | Age: 80
End: 2025-07-10
Payer: MEDICARE

## 2025-07-10 DIAGNOSIS — M16.12 LOCALIZED OSTEOARTHROSIS OF LEFT HIP: Primary | ICD-10-CM

## 2025-07-10 PROCEDURE — 20611 DRAIN/INJ JOINT/BURSA W/US: CPT | Mod: LT | Performed by: FAMILY MEDICINE

## 2025-07-10 PROCEDURE — 99212 OFFICE O/P EST SF 10 MIN: CPT | Performed by: FAMILY MEDICINE

## 2025-07-10 PROCEDURE — 1159F MED LIST DOCD IN RCRD: CPT | Performed by: FAMILY MEDICINE

## 2025-07-10 PROCEDURE — 99213 OFFICE O/P EST LOW 20 MIN: CPT | Performed by: FAMILY MEDICINE

## 2025-07-10 PROCEDURE — 2500000004 HC RX 250 GENERAL PHARMACY W/ HCPCS (ALT 636 FOR OP/ED): Performed by: FAMILY MEDICINE

## 2025-07-10 RX ORDER — LIDOCAINE HYDROCHLORIDE 10 MG/ML
4 INJECTION, SOLUTION INFILTRATION; PERINEURAL
Status: COMPLETED | OUTPATIENT
Start: 2025-07-10 | End: 2025-07-10

## 2025-07-10 RX ORDER — TRIAMCINOLONE ACETONIDE 40 MG/ML
80 INJECTION, SUSPENSION INTRA-ARTICULAR; INTRAMUSCULAR
Status: COMPLETED | OUTPATIENT
Start: 2025-07-10 | End: 2025-07-10

## 2025-07-10 RX ADMIN — TRIAMCINOLONE ACETONIDE 80 MG: 400 INJECTION, SUSPENSION INTRA-ARTICULAR; INTRAMUSCULAR at 13:29

## 2025-07-10 RX ADMIN — LIDOCAINE HYDROCHLORIDE 4 ML: 10 INJECTION, SOLUTION INFILTRATION; PERINEURAL at 13:29

## 2025-07-10 NOTE — PROGRESS NOTES
** Please excuse any errors in grammar or translation related to this dictation. Voice recognition software was utilized to prepare this document. **    Assessment & Plan:  Patient following up for nonoperative management of advanced left hip arthritis.  Steroid injections have provided significant relief.  Patient was offered to repeat today for this reason and she agreed to have performed.  Given the severity of her OA patient should also strongly consider arthroplasty.  She has met with Dr. Roberson before and encouraged her to follow-up once cleared from her cardiologist.  - Exercise program to improve muscle strength for support.  Updated referral to physical therapy.  - Activity modifications as needed to include use of cane/walker or braces.  - Can optimize Tylenol use up to 3 g daily  - Steroid injection.  Elected to repeat today.  - Follow-up with Dr. Roberson in regards arthroplasty.  Informed patient that steroid injection can be repeated every 3 or more months as symptoms dictate. Follow-up as needed for ongoing management.  All questions answered and patient is agreeable to this plan.     Chief complaint:  Left hip pain    HPI:  7/10/25: Patient follows up for left hip pain.  The injection from 3/26 provided relief until June.  No interval injuries.  Patient feels that her hip is dislocating while ambulating.  She is considering arthroplasty however is until cardiology follow-up status post ablation for A-fib which occurs in August.    3/26/2025: Patient returns today for follow-up of left hip pain due to osteoarthritis.  Last left intra-articular corticosteroid injection on 6/12/2024 provided her 7 months of relief.  She notes that pain has gradually returned.  She is using Tylenol once a week. She reported that she was hospitalized in December due to atrial fibrillation.  She noted prolonged time of being bedridden which seems to have contributed to her left hip and knee pain.  She recently saw   Karol on 3/20/2025 where she obtained a left knee corticosteroid injection.  She notes some relief with her knee pain today.  She is interested in repeating left hip intra-articular corticosteroid injection today.    6/12/2024: 79 y/o F, hx of DM and lumbar surgery, presents with left hip pain.  This complaint has been ongoing for 6 weeks.  Symptoms started after doing PT.  Pain is most prominent at lateral hip radiating into groin.  To date, patient has tried a variety of treatments to include topical creams and otc nsaids with little sustained effect. Previously saw Dr. Roberson for this with last visit being 5/23/24.  Denies previous surgery to this site. Referred here to be evaluated for nonoperative management of left hip OA.    Patient Active Problem List   Diagnosis    Anemia    Arthritis    Bilateral leg edema    Chronic renal insufficiency    Cough    Essential hypertension    Fatigue    Hyperlipidemia    Lower back pain    Lung crackles    Nephrolithiasis    Obesity    Pneumonia    Right hip pain    Shortness of breath    Spinal stenosis    Unstable balance    Urinary tract infection    URTI (acute upper respiratory infection)    Vitamin D deficiency    DM type 2 with diabetic mixed hyperlipidemia (Multi)    Malignant hypertension    Diabetes mellitus due to underlying condition with stage 3a chronic kidney disease (Multi)    Localized osteoarthrosis of left hip    Acute pain of left shoulder    Upper respiratory tract infection    Stage 5 chronic kidney disease not on chronic dialysis (Multi)    Screening mammogram for breast cancer    Paroxysmal atrial fibrillation (Multi)    Anticoagulant long-term use     Past Surgical History:   Procedure Laterality Date    CARDIAC ELECTROPHYSIOLOGY PROCEDURE N/A 5/21/2025    Procedure: Ablation A-Fib;  Surgeon: Cesar Thibodeaux MD;  Location: [facility];  Service: Electrophysiology;  Laterality: N/A;    CARDIOVERSION      CATARACT EXTRACTION W/  INTRAOCULAR  LENS IMPLANT      CHOLECYSTECTOMY      COLONOSCOPY      ECTOPIC PREGNANCY SURGERY      LUMBAR SPINE SURGERY  2010    SKIN CANCER EXCISION      SPINAL FUSION  04/05/2011    [id number]    TUBAL LIGATION       Current Outpatient Medications on File Prior to Visit   Medication Sig Dispense Refill    amiodarone (Pacerone) 200 mg tablet Take 1 tablet (200 mg) by mouth once daily. 90 tablet 0    amLODIPine (Norvasc) 10 mg tablet Take 1 tablet (10 mg) by mouth once daily. 90 tablet 3    Autolet lancing device 1 each 2 times a day. CHECK BLOOD SUGAR TWICE DAILY 100 each 1    blood sugar diagnostic (Accu-Chek Guide test strips) strip 1 strip 2 times a day. CHECK BLOOD SUGAR TWICE DAILY 100 strip 2    cholecalciferol (Vitamin D-3) 50 mcg (2,000 units) capsule TAKE 1 CAPSULE ONE TIME DAILY 90 capsule 3    cholecalciferol (Vitamin D-3) 50 MCG (2000 UT) tablet Take 1 tablet (50 mcg) by mouth once daily. 90 tablet 1    citalopram (CeleXA) 20 mg tablet Take 1 tablet (20 mg) by mouth once every day. 90 tablet 1    cranberry 400 mg capsule Take 1 tablet by mouth once daily.      diclofenac sodium (Voltaren) 1 % gel APPLY TO THE AFFECTED AREAS 2 GRAMS (2.25 INCHES) TOPICALLY TWICE DAILY AS NEEDED 200 g 0    fenofibrate (Tricor) 145 mg tablet Take 1 tablet (145 mg) by mouth once daily. 90 tablet 3    ferrous gluconate 324 (38 Fe) mg tablet Take 1 tablet (324 mg) by mouth 2 times a day. 180 tablet 2    metoprolol tartrate (Lopressor) 50 mg tablet Take 1 tablet by mouth 2 times a day. 180 tablet 1    olmesartan (BENIcar) 40 mg tablet Take 1 tablet (40 mg) by mouth once daily. 90 tablet 1    oxybutynin XL (Ditropan-XL) 10 mg 24 hr tablet Take 1 tablet (10 mg) by mouth once daily at bedtime. 90 tablet 3    pantoprazole (ProtoNix) 40 mg EC tablet Take 1 tablet (40 mg) by mouth once daily. Do not crush, chew, or split. Take for 30 days post procedure then stop 30 tablet 0    rivaroxaban (Xarelto) 15 mg tablet Take 1 tablet (15 mg) by mouth  once daily in the evening. Take with meals. Take with food.      simvastatin (Zocor) 20 mg tablet Take 1 tablet (20 mg) by mouth once every day. 90 tablet 1    tirzepatide (Mounjaro) 5 mg/0.5 mL pen injector INJECT 5MG (1 PEN) UNDER THE SKIN EVERY WEEK 2 mL 3     No current facility-administered medications on file prior to visit.     Exam:  LEFT Hip Exam:  Antalgic gait  No warmth, erythema or ecchymosis overlying.  Active flexion >90 degrees. Limited IR limited to 10deg.  NTTP over greater trochanter, glute tendons  [5]/5 strength of hip flexion, abduction, & adduction  SILT  [ - ]Log roll pain, [ + ]FADIR pain, [ - ]JESSICA pain, [ + ]Stinchfield    General Exam:  Constitutional - NAD, AAO x 3, conversing appropriately.  HEENT- Normocephalic and atraumatic. EOMI, PERRLA, No scleral icterus. No facial deformities. Hearing grossly normal.  Lungs - Breathing non-labored with normal rate. No accessory muscle use.  CV - Extremities warm and well-perfused, brisk capillary refill present.   Neuro - CN II-XII grossly intact.    Results:  X-rays left hip obtained 5/23/24: advanced bilateral hip degenerative change. Surgical hardware visible in lumbar spine and across SI joint.     Lab Results   Component Value Date    HGBA1C 5.2 08/20/2024    HGBA1C 5.6 04/09/2024    CREATININE 1.63 (H) 05/15/2025    EGFR 31 (L) 05/15/2025      Procedure:  Patient ID: Francheska Ochoa is a 79 y.o. female.    L Inj/Asp: L hip joint on 7/10/2025 1:29 PM  Indications: pain  Details: 22 G (spinal) needle, ultrasound-guided anterior approach  Medications: 80 mg triamcinolone acetonide 40 mg/mL; 4 mL lidocaine 10 mg/mL (1 %)  Outcome: tolerated well, no immediate complications    Procedure risk factors to include increased pain, infection, bleeding, neurovascular injury particularly given close proximity to femoral vessels, soft tissue injury, progressive cartilage loss, transient elevation of blood glucose and blood pressure, and adverse  reaction to medication were discussed with the patient. Patient understands there is a moderate risk of morbidity from undergoing the procedure.      Procedure, treatment alternatives, risks and benefits explained, specific risks discussed. Consent was given by the patient. Immediately prior to procedure a time out was called to verify the correct patient, procedure, equipment, support staff and site/side marked as required. Patient was prepped and draped in the usual sterile fashion.

## 2025-07-12 DIAGNOSIS — D64.9 ANEMIA, UNSPECIFIED TYPE: ICD-10-CM

## 2025-07-14 RX ORDER — FERROUS GLUCONATE 324(38)MG
1 TABLET ORAL 2 TIMES DAILY
Qty: 180 TABLET | Refills: 3 | Status: SHIPPED | OUTPATIENT
Start: 2025-07-14

## 2025-07-15 ENCOUNTER — APPOINTMENT (OUTPATIENT)
Dept: ORTHOPEDIC SURGERY | Facility: CLINIC | Age: 80
End: 2025-07-15
Payer: MEDICARE

## 2025-07-23 DIAGNOSIS — E78.2 DM TYPE 2 WITH DIABETIC MIXED HYPERLIPIDEMIA (MULTI): ICD-10-CM

## 2025-07-23 DIAGNOSIS — E11.69 DM TYPE 2 WITH DIABETIC MIXED HYPERLIPIDEMIA (MULTI): ICD-10-CM

## 2025-07-23 RX ORDER — TIRZEPATIDE 5 MG/.5ML
INJECTION, SOLUTION SUBCUTANEOUS
Qty: 2 ML | Refills: 3 | Status: SHIPPED | OUTPATIENT
Start: 2025-07-23

## 2025-07-24 DIAGNOSIS — E78.5 HYPERLIPIDEMIA, UNSPECIFIED HYPERLIPIDEMIA TYPE: ICD-10-CM

## 2025-07-24 DIAGNOSIS — E11.69 DM TYPE 2 WITH DIABETIC MIXED HYPERLIPIDEMIA (MULTI): ICD-10-CM

## 2025-07-24 DIAGNOSIS — I10 ESSENTIAL HYPERTENSION: ICD-10-CM

## 2025-07-24 DIAGNOSIS — E78.2 DM TYPE 2 WITH DIABETIC MIXED HYPERLIPIDEMIA (MULTI): ICD-10-CM

## 2025-07-24 RX ORDER — OLMESARTAN MEDOXOMIL 40 MG/1
40 TABLET ORAL DAILY
Qty: 90 TABLET | Refills: 0 | Status: SHIPPED | OUTPATIENT
Start: 2025-07-24

## 2025-07-24 RX ORDER — SIMVASTATIN 20 MG/1
20 TABLET, FILM COATED ORAL DAILY
Qty: 90 TABLET | Refills: 0 | Status: SHIPPED | OUTPATIENT
Start: 2025-07-24

## 2025-07-30 ENCOUNTER — APPOINTMENT (OUTPATIENT)
Dept: PRIMARY CARE | Facility: CLINIC | Age: 80
End: 2025-07-30
Payer: MEDICARE

## 2025-07-30 VITALS
HEART RATE: 67 BPM | SYSTOLIC BLOOD PRESSURE: 131 MMHG | WEIGHT: 175 LBS | BODY MASS INDEX: 32.2 KG/M2 | DIASTOLIC BLOOD PRESSURE: 74 MMHG | HEIGHT: 62 IN

## 2025-07-30 DIAGNOSIS — E78.5 HYPERLIPIDEMIA, UNSPECIFIED HYPERLIPIDEMIA TYPE: ICD-10-CM

## 2025-07-30 DIAGNOSIS — E78.2 DM TYPE 2 WITH DIABETIC MIXED HYPERLIPIDEMIA (MULTI): ICD-10-CM

## 2025-07-30 DIAGNOSIS — N18.30 ANEMIA DUE TO STAGE 3 CHRONIC KIDNEY DISEASE, UNSPECIFIED WHETHER STAGE 3A OR 3B CKD: Primary | ICD-10-CM

## 2025-07-30 DIAGNOSIS — I48.0 PAROXYSMAL ATRIAL FIBRILLATION (MULTI): ICD-10-CM

## 2025-07-30 DIAGNOSIS — E11.69 DM TYPE 2 WITH DIABETIC MIXED HYPERLIPIDEMIA (MULTI): ICD-10-CM

## 2025-07-30 DIAGNOSIS — D63.1 ANEMIA DUE TO STAGE 3 CHRONIC KIDNEY DISEASE, UNSPECIFIED WHETHER STAGE 3A OR 3B CKD: Primary | ICD-10-CM

## 2025-07-30 DIAGNOSIS — E55.9 VITAMIN D DEFICIENCY: ICD-10-CM

## 2025-07-30 DIAGNOSIS — Z79.01 ANTICOAGULANT LONG-TERM USE: ICD-10-CM

## 2025-07-30 DIAGNOSIS — I10 ESSENTIAL HYPERTENSION: ICD-10-CM

## 2025-07-30 PROCEDURE — G2211 COMPLEX E/M VISIT ADD ON: HCPCS | Performed by: INTERNAL MEDICINE

## 2025-07-30 PROCEDURE — 3075F SYST BP GE 130 - 139MM HG: CPT | Performed by: INTERNAL MEDICINE

## 2025-07-30 PROCEDURE — 1036F TOBACCO NON-USER: CPT | Performed by: INTERNAL MEDICINE

## 2025-07-30 PROCEDURE — 3078F DIAST BP <80 MM HG: CPT | Performed by: INTERNAL MEDICINE

## 2025-07-30 PROCEDURE — 99214 OFFICE O/P EST MOD 30 MIN: CPT | Performed by: INTERNAL MEDICINE

## 2025-07-30 ASSESSMENT — LIFESTYLE VARIABLES
HOW OFTEN DO YOU HAVE SIX OR MORE DRINKS ON ONE OCCASION: NEVER
HOW OFTEN DO YOU HAVE A DRINK CONTAINING ALCOHOL: NEVER
AUDIT-C TOTAL SCORE: 0
SKIP TO QUESTIONS 9-10: 1
HOW MANY STANDARD DRINKS CONTAINING ALCOHOL DO YOU HAVE ON A TYPICAL DAY: PATIENT DOES NOT DRINK

## 2025-07-30 NOTE — PROGRESS NOTES
Subjective    Francheska Ochoa is a 79 y.o. female presenting for routine follow up. PMHx atrial fibrillation with RVR on rivaroxaban, HTN, HLD, DM2, CKD3b following with nephrology. Patient notes easy bruising and painful bump on anterior tibia after minor trauma. No other acute concerns at this time.    Objective    Physical Exam  Constitutional:       General: She is not in acute distress.     Appearance: Normal appearance. She is obese. She is not ill-appearing.   HENT:      Head: Normocephalic and atraumatic.      Nose: Nose normal.      Mouth/Throat:      Mouth: Mucous membranes are moist.      Pharynx: Oropharynx is clear.     Eyes:      Pupils: Pupils are equal, round, and reactive to light.       Cardiovascular:      Rate and Rhythm: Normal rate.      Heart sounds: Normal heart sounds.   Pulmonary:      Effort: Pulmonary effort is normal.      Breath sounds: Normal breath sounds.   Abdominal:      General: Abdomen is flat.      Palpations: Abdomen is soft.     Musculoskeletal:         General: Normal range of motion.      Right lower leg: Edema present.      Left lower leg: Edema present.      Comments: Bilateral non-pitting ankle edema     Skin:     General: Skin is warm and dry.      Capillary Refill: Capillary refill takes less than 2 seconds.      Findings: Bruising and lesion present.      Comments: Scattered purpurae bilateral upper and lower extremities on abductor/extensor surfaces     Neurological:      General: No focal deficit present.      Mental Status: She is alert and oriented to person, place, and time. Mental status is at baseline.       Heart Rate:  [67] 67  BP: (131)/(74) 131/74  Vitals:    07/30/25 1047   Weight: 79.4 kg (175 lb)     Labs:  CBC:  Recent Labs     05/15/25  1333 04/02/25  1301 08/20/24  1138   WBC 5.6 7.8 6.8   HGB 12.5 12.9 12.3   HCT 39.0 39.5 37.8    227 221   .2* 99.7 101*     CMP:  Recent Labs     05/15/25  1333 04/02/25  1301 02/12/25  1450    138  137   K 5.1 5.6* 4.8    104 100   CO2 27 26 30   ANIONGAP 9 8 7   BUN 46* 42* 43*   CREATININE 1.63* 1.71* 1.55*   EGFR 31* 30* 33*   GLUCOSE 103 88 86     Recent Labs     02/12/25  1450 08/20/24  1138 02/14/24  1356 09/19/23  1057 03/28/22  1801   ALBUMIN 4.3 3.9 3.8 4.1 4.4   ALKPHOS 57 40  --  41 54   ALT 16 11  --  11 15   AST 18 15  --  14 18   BILITOT 0.5 0.4  --  0.5 0.4   LIPASE  --   --   --   --  43    < > = values in this interval not displayed.     Calcium/Phos:   Lab Results   Component Value Date    CALCIUM 9.3 05/15/2025    PHOS 4.2 08/20/2024      ENDO:  Recent Labs     08/20/24  1138 04/09/24  1041 09/19/23  1057 06/05/23  1249 03/06/23  0948   TSH 1.35  --   --   --  1.89   HGBA1C 5.2 5.6 5.2 5.5 5.9*    < > = values in this interval not displayed.      CARDIAC:   Recent Labs     08/20/24  1138 03/06/23  0948 05/17/22  0952 12/14/21  0745   CHOL 136 127 134 138   LDLF  --  63 65 69   LDLCALC 63  --   --   --    HDL 55.7 45.4 50.8 50.1   TRIG 88 94 89 94     Imaging:  No results found.    Meds:  Current Outpatient Medications   Medication Instructions    amiodarone (PACERONE) 200 mg, oral, Daily    amLODIPine (NORVASC) 10 mg, oral, Daily    Autolet lancing device 1 each, miscellaneous, 2 times daily, CHECK BLOOD SUGAR TWICE DAILY    blood sugar diagnostic (Accu-Chek Guide test strips) strip 1 strip, miscellaneous, 2 times daily, CHECK BLOOD SUGAR TWICE DAILY    cholecalciferol (VITAMIN D-3) 50 mcg, oral, Daily    cholecalciferol (VITAMIN D-3) 50 mcg, oral, Daily    citalopram (CELEXA) 20 mg, oral, Every Day    cranberry 400 mg capsule 1 tablet, Daily    diclofenac sodium (Voltaren) 1 % gel APPLY TO THE AFFECTED AREAS 2 GRAMS (2.25 INCHES) TOPICALLY TWICE DAILY AS NEEDED    fenofibrate (TRICOR) 145 mg, oral, Daily    ferrous gluconate 324 (38 Fe) MG tablet 1 tablet, oral, 2 times daily    metoprolol tartrate (LOPRESSOR) 50 mg, oral, 2 times daily    olmesartan (BENICAR) 40 mg, oral, Daily     oxyBUTYnin XL (DITROPAN-XL) 10 mg, oral, Nightly    pantoprazole (PROTONIX) 40 mg, oral, Daily, Do not crush, chew, or split. Take for 30 days post procedure then stop    rivaroxaban (XARELTO) 15 mg, Daily with evening meal    simvastatin (ZOCOR) 20 mg, oral, Daily    tirzepatide (Mounjaro) 5 mg/0.5 mL pen injector INJECT 5MG (1 PEN) UNDER THE SKIN EVERY WEEK     Assessment    Francheska Ochoa is a 79 y.o. female PMHx atrial fibrillation with RVR on rivaroxaban, HTN, HLD, DM2, OAB, CKD3b following with nephrology. Purpura and pretibial lesion likely represent bleeding from minor trauma. Concern for bleeding risk on rivaroxaban. Swelling in ankles likely dependent edema.    # Atrial fibrillation  - Continue rivaroxaban 15mg  - Continue amiodarone 200mg  - Continue metoprolol 50mg twice daily    # HTN  - Continue omelsartan 40mg and amlodipine 10mg  # HLD  - Conitnue simvastatin 20mg    # DM2  - Conitnue tirzepatide weekly    # CKD 3b  :: Baseline EGFR ~30  - Continue to follow with nephrology  - Avoid nephrotoxic medications    # Routine health maintenance  - No indication for mammography or colorectal cancer screening due to age  - TSH, vitamin D, CMP, A1c due today  - CBC due 4/2026  - Urine albumin followed by nephrology    Reji Estrella MD MS  PGY3 Internal Medicine

## 2025-07-31 LAB
25(OH)D3+25(OH)D2 SERPL-MCNC: 47 NG/ML (ref 30–100)
ALBUMIN SERPL-MCNC: 3.9 G/DL (ref 3.6–5.1)
ALP SERPL-CCNC: 48 U/L (ref 37–153)
ALT SERPL-CCNC: 24 U/L (ref 6–29)
ANION GAP SERPL CALCULATED.4IONS-SCNC: 7 MMOL/L (CALC) (ref 7–17)
AST SERPL-CCNC: 23 U/L (ref 10–35)
BILIRUB SERPL-MCNC: 0.4 MG/DL (ref 0.2–1.2)
BUN SERPL-MCNC: 35 MG/DL (ref 7–25)
CALCIUM SERPL-MCNC: 9.3 MG/DL (ref 8.6–10.4)
CHLORIDE SERPL-SCNC: 102 MMOL/L (ref 98–110)
CO2 SERPL-SCNC: 29 MMOL/L (ref 20–32)
CREAT SERPL-MCNC: 1.44 MG/DL (ref 0.6–0.95)
EGFRCR SERPLBLD CKD-EPI 2021: 37 ML/MIN/1.73M2
EST. AVERAGE GLUCOSE BLD GHB EST-MCNC: 123 MG/DL
EST. AVERAGE GLUCOSE BLD GHB EST-SCNC: 6.8 MMOL/L
GLUCOSE SERPL-MCNC: 90 MG/DL (ref 65–99)
HBA1C MFR BLD: 5.9 %
POTASSIUM SERPL-SCNC: 4.5 MMOL/L (ref 3.5–5.3)
PROT SERPL-MCNC: 6.2 G/DL (ref 6.1–8.1)
SODIUM SERPL-SCNC: 138 MMOL/L (ref 135–146)
TSH SERPL-ACNC: 1.5 MIU/L (ref 0.4–4.5)

## 2025-08-07 ENCOUNTER — TELEPHONE (OUTPATIENT)
Dept: PRIMARY CARE | Facility: CLINIC | Age: 80
End: 2025-08-07
Payer: MEDICARE

## 2025-08-08 DIAGNOSIS — M19.90 ARTHRITIS: Primary | ICD-10-CM

## 2025-08-25 ENCOUNTER — OFFICE VISIT (OUTPATIENT)
Dept: CARDIOLOGY | Facility: HOSPITAL | Age: 80
End: 2025-08-25
Payer: MEDICARE

## 2025-08-25 VITALS
SYSTOLIC BLOOD PRESSURE: 107 MMHG | DIASTOLIC BLOOD PRESSURE: 66 MMHG | BODY MASS INDEX: 32.74 KG/M2 | OXYGEN SATURATION: 95 % | WEIGHT: 179 LBS | HEART RATE: 54 BPM

## 2025-08-25 DIAGNOSIS — I48.0 PAROXYSMAL ATRIAL FIBRILLATION (MULTI): Primary | ICD-10-CM

## 2025-08-25 PROCEDURE — 93005 ELECTROCARDIOGRAM TRACING: CPT | Performed by: INTERNAL MEDICINE

## 2025-08-25 PROCEDURE — 3074F SYST BP LT 130 MM HG: CPT | Performed by: INTERNAL MEDICINE

## 2025-08-25 PROCEDURE — 3078F DIAST BP <80 MM HG: CPT | Performed by: INTERNAL MEDICINE

## 2025-08-25 PROCEDURE — 99212 OFFICE O/P EST SF 10 MIN: CPT | Mod: 25

## 2025-08-25 PROCEDURE — 1159F MED LIST DOCD IN RCRD: CPT | Performed by: INTERNAL MEDICINE

## 2025-08-25 PROCEDURE — 93010 ELECTROCARDIOGRAM REPORT: CPT | Performed by: INTERNAL MEDICINE

## 2025-08-25 PROCEDURE — 99214 OFFICE O/P EST MOD 30 MIN: CPT | Performed by: INTERNAL MEDICINE

## 2025-08-25 PROCEDURE — 1036F TOBACCO NON-USER: CPT | Performed by: INTERNAL MEDICINE

## 2025-08-25 ASSESSMENT — ENCOUNTER SYMPTOMS
DEPRESSION: 0
LOSS OF SENSATION IN FEET: 0
OCCASIONAL FEELINGS OF UNSTEADINESS: 1

## 2025-08-26 ENCOUNTER — EVALUATION (OUTPATIENT)
Dept: PHYSICAL THERAPY | Facility: CLINIC | Age: 80
End: 2025-08-26
Payer: MEDICARE

## 2025-08-26 DIAGNOSIS — M25.552 LEFT HIP PAIN: Primary | ICD-10-CM

## 2025-08-26 LAB
ATRIAL RATE: 54 BPM
P AXIS: 80 DEGREES
P OFFSET: 203 MS
P ONSET: 150 MS
PR INTERVAL: 142 MS
Q ONSET: 221 MS
QRS COUNT: 9 BEATS
QRS DURATION: 94 MS
QT INTERVAL: 436 MS
QTC CALCULATION(BAZETT): 413 MS
QTC FREDERICIA: 420 MS
R AXIS: 35 DEGREES
T AXIS: 66 DEGREES
T OFFSET: 439 MS
VENTRICULAR RATE: 54 BPM

## 2025-09-03 ENCOUNTER — TREATMENT (OUTPATIENT)
Dept: PHYSICAL THERAPY | Facility: CLINIC | Age: 80
End: 2025-09-03
Payer: MEDICARE

## 2025-09-03 PROCEDURE — 97110 THERAPEUTIC EXERCISES: CPT | Mod: GP,CQ

## 2025-09-03 PROCEDURE — 97112 NEUROMUSCULAR REEDUCATION: CPT | Mod: GP,CQ

## 2025-09-09 ENCOUNTER — APPOINTMENT (OUTPATIENT)
Dept: NEPHROLOGY | Facility: CLINIC | Age: 80
End: 2025-09-09
Payer: MEDICARE

## 2025-11-19 ENCOUNTER — APPOINTMENT (OUTPATIENT)
Dept: PRIMARY CARE | Facility: CLINIC | Age: 80
End: 2025-11-19
Payer: MEDICARE

## 2026-02-02 ENCOUNTER — APPOINTMENT (OUTPATIENT)
Dept: PODIATRY | Facility: CLINIC | Age: 81
End: 2026-02-02
Payer: MEDICARE

## (undated) DEVICE — INTRODUCER, HEMOSTASIS, STR/J .038 IN, 8.5FR 12CM

## (undated) DEVICE — TUBING SET, IRRIGATION, SMARTABLATE

## (undated) DEVICE — SHEATH, STEERABLE, SUREFLEX, MEDIUM CURVE

## (undated) DEVICE — INTRODUCER, HEMOSTASIS, STR/J .038IN 5FR 12CM

## (undated) DEVICE — CATHETER, ULTRASOUND, DIAGNOSTIC, ACUNAV, 10 FR X 90 CM

## (undated) DEVICE — CATHETHER, CS, BI-DIRECTIONAL, 10 POLES, D-F TYPE

## (undated) DEVICE — PATCHES, EXTERNAL REFERENCE, CARTO3

## (undated) DEVICE — NEEDLE, TRANSSEPTAL, CURVE, W/STYLET, ADULT, 18 G X 71 CM

## (undated) DEVICE — Device

## (undated) DEVICE — CATHETER, PENTARAY, NAV ECO, 7FR, 2-6-2 SPACING, F CURVE

## (undated) DEVICE — INTRODUCER, SHEATH, FAST-CATH, 8FR X 12CM, C-LOCK

## (undated) DEVICE — CATHETER, THERMOCOOL SMART TOUCH, SF, D-F CURVE